# Patient Record
Sex: FEMALE | Race: WHITE | NOT HISPANIC OR LATINO | Employment: OTHER | URBAN - METROPOLITAN AREA
[De-identification: names, ages, dates, MRNs, and addresses within clinical notes are randomized per-mention and may not be internally consistent; named-entity substitution may affect disease eponyms.]

---

## 2022-02-03 ENCOUNTER — OFFICE VISIT (OUTPATIENT)
Dept: ORTHOPEDIC SURGERY | Facility: CLINIC | Age: 62
End: 2022-02-03

## 2022-02-03 VITALS
SYSTOLIC BLOOD PRESSURE: 140 MMHG | BODY MASS INDEX: 32.31 KG/M2 | HEIGHT: 55 IN | WEIGHT: 139.6 LBS | DIASTOLIC BLOOD PRESSURE: 68 MMHG

## 2022-02-03 DIAGNOSIS — M19.011 PRIMARY LOCALIZED OSTEOARTHROSIS OF RIGHT SHOULDER REGION: Primary | ICD-10-CM

## 2022-02-03 DIAGNOSIS — M25.511 RIGHT SHOULDER PAIN, UNSPECIFIED CHRONICITY: ICD-10-CM

## 2022-02-03 DIAGNOSIS — M75.21 BICEPS TENDINITIS OF RIGHT UPPER EXTREMITY: ICD-10-CM

## 2022-02-03 DIAGNOSIS — M24.511 CONTRACTURE OF JOINT OF RIGHT SHOULDER REGION: ICD-10-CM

## 2022-02-03 PROCEDURE — 99204 OFFICE O/P NEW MOD 45 MIN: CPT | Performed by: ORTHOPAEDIC SURGERY

## 2022-02-03 RX ORDER — AMLODIPINE BESYLATE 10 MG/1
10 TABLET ORAL NIGHTLY
COMMUNITY
Start: 2021-11-09

## 2022-02-03 RX ORDER — CITALOPRAM 40 MG/1
40 TABLET ORAL DAILY
COMMUNITY
Start: 2022-01-11 | End: 2023-01-06

## 2022-02-03 RX ORDER — SUCROFERRIC OXYHYDROXIDE 500 MG/1
500 TABLET, CHEWABLE ORAL
COMMUNITY
Start: 2021-10-15 | End: 2022-04-13

## 2022-02-03 RX ORDER — INSULIN GLARGINE 100 [IU]/ML
20 INJECTION, SOLUTION SUBCUTANEOUS
COMMUNITY

## 2022-02-03 RX ORDER — CARVEDILOL 25 MG/1
25 TABLET ORAL 2 TIMES DAILY WITH MEALS
COMMUNITY
Start: 2022-01-18 | End: 2023-01-18

## 2022-02-03 RX ORDER — OMEPRAZOLE 40 MG/1
40 CAPSULE, DELAYED RELEASE ORAL DAILY
COMMUNITY
Start: 2021-08-19

## 2022-02-03 RX ORDER — FUROSEMIDE 20 MG/1
1 TABLET ORAL DAILY
COMMUNITY
End: 2022-07-06

## 2022-02-03 RX ORDER — ASPIRIN 81 MG/1
1 TABLET ORAL DAILY
COMMUNITY

## 2022-02-03 RX ORDER — CLONIDINE 0.3 MG/24H
1 PATCH, EXTENDED RELEASE TRANSDERMAL WEEKLY
COMMUNITY
Start: 2021-11-17 | End: 2022-07-06

## 2022-02-03 RX ORDER — INSULIN ASPART 100 [IU]/ML
INJECTION, SOLUTION INTRAVENOUS; SUBCUTANEOUS
COMMUNITY
Start: 2022-02-01

## 2022-02-03 RX ORDER — ONDANSETRON 4 MG/1
4 TABLET, FILM COATED ORAL
COMMUNITY

## 2022-02-03 RX ORDER — CETIRIZINE HYDROCHLORIDE 10 MG/1
10 TABLET ORAL DAILY
COMMUNITY
Start: 2021-10-04

## 2022-02-03 RX ORDER — BUMETANIDE 2 MG/1
4 TABLET ORAL 2 TIMES DAILY
COMMUNITY
Start: 2021-12-06 | End: 2022-12-06

## 2022-02-03 RX ORDER — GLUCOSAMINE/CHONDROITIN/C/MANG 500-400 MG
1 CAPSULE ORAL NIGHTLY
COMMUNITY

## 2022-02-03 RX ORDER — PHENOL 1.4 %
10 AEROSOL, SPRAY (ML) MUCOUS MEMBRANE NIGHTLY
COMMUNITY

## 2022-02-03 RX ORDER — CYCLOBENZAPRINE HCL 10 MG
10 TABLET ORAL
COMMUNITY
Start: 2021-12-16 | End: 2022-06-14

## 2022-02-03 RX ORDER — SEVELAMER CARBONATE 800 MG/1
2400 TABLET, FILM COATED ORAL
COMMUNITY
Start: 2022-01-31 | End: 2022-05-01

## 2022-02-03 RX ORDER — AMPICILLIN TRIHYDRATE 500 MG
450 CAPSULE ORAL NIGHTLY
COMMUNITY

## 2022-02-03 NOTE — PROGRESS NOTES
"                                                                    [facility] Office Visit - Obey Santiago MD    Office Visit       Patient Name: Yashira Gudino    Chief Complaint:   Chief Complaint   Patient presents with   • Right Shoulder - Pain       Referring Physician: Yuri Garcia MD-I appreciate the referral    History of Present Illness:   Yashira Gudino is a 61 y.o. female who presents with right body part: shoulder Reason: pain.  Onset:Onset: mechanical fall. The issue has been ongoing for 2 year(s). Pain is a 8/10 on the pain scale. Pain is described as Pain Characterization: stabbing and shooting. Associated symptoms include Symptoms: pain, popping and grinding. The pain is worse with working, leisure, lying on affected side and any movement of the joint; resting improve the pain. Previous treatments have included: injections and  oral steroids.  I have reviewed the patient's history of present illness as noted/entered above.    I have reviewed the patient's past medical history, surgical history, social history, family history, medications, and allergies as noted in the electronic medical record and as noted/entered.  I have reviewed the patient's review of systems as noted/enter and updated as noted in the patient's HPI.    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 I counseled that she would certainly be incredibly high risk of complications especially in light of her significant other health issues and MRSA history.      Subjective   Subjective      Review of Systems   Musculoskeletal: Positive for arthralgias.        Past Medical History: No past medical history on file.    Past Surgical History: No past surgical history on file.    Family History: No family history on file.    Social History:   Social History     Socioeconomic History   • Marital status: Legally        Medications:   Current Outpatient Medications:   •  amLODIPine (NORVASC) 10 MG tablet, Take 10 mg by mouth Daily., Disp: , Rfl:   •  bumetanide (BUMEX) 2 MG tablet, Take 4 mg by mouth., Disp: , Rfl:   •  carvedilol (COREG) 25 MG tablet, Take 25 mg by mouth., Disp: , Rfl:   •  cetirizine (zyrTEC) 10 MG tablet, Take 10 mg by mouth., Disp: , Rfl:   •  citalopram (CeleXA) 40 MG tablet, Take 40 mg by mouth Daily., Disp: , Rfl:   •  cloNIDine (CATAPRES-TTS) 0.3 MG/24HR patch, Place 1 patch on the skin as directed by provider 1 (One) Time Per Week., Disp: , Rfl:   •  cyclobenzaprine (FLEXERIL) 10 MG tablet, Take 10 mg by mouth., Disp: , Rfl:   •  Diclofenac Sodium (VOLTAREN) 1 % gel gel, Place 1 application on the skin as directed by provider., Disp: , Rfl:   •  insulin aspart (NovoLOG FlexPen) 100 UNIT/ML solution pen-injector sc pen, Inject 3 or 4 units before meals , titrate as directed to MDD 25 units, Disp: , Rfl:   •  omeprazole (priLOSEC) 40 MG capsule, Take 40 mg by mouth Daily., Disp: , Rfl:   •  sevelamer (RENVELA) 800 MG tablet, Take 2,400 mg by mouth., Disp: , Rfl:   •  Sucroferric Oxyhydroxide (Velphoro) 500 MG chewable tablet, Chew 500 mg., Disp: ,  "Rfl:   •  aspirin (aspirin) 81 MG EC tablet, Take 1 tablet by mouth Daily., Disp: , Rfl:   •  Cranberry 450 MG capsule, Take 450 mg by mouth Daily., Disp: , Rfl:   •  furosemide (Lasix) 20 MG tablet, Take 1 tablet by mouth Daily., Disp: , Rfl:   •  Glucosamine-Chondroit-Vit C-Mn (Glucosamine-Chondroitin Max St) capsule, Take 1 capsule by mouth Daily., Disp: , Rfl:   •  Insulin Glargine (Lantus SoloStar) 100 UNIT/ML injection pen, Inject 12 units sub-q in the morning and 7 units sub-q in the evening, Disp: , Rfl:   •  Melatonin 10 MG tablet, Take 10 mg by mouth., Disp: , Rfl:   •  ondansetron (ZOFRAN) 4 MG tablet, Take 4 mg by mouth., Disp: , Rfl:   •  sodium zirconium cyclosilicate (LOKELMA) 5 g pack, Take 5 g by mouth Daily., Disp: , Rfl:     Allergies:   Allergies   Allergen Reactions   • Erythromycin Nausea Only and Other (See Comments)     nausea  upset stomach   • Erythromycin Base Other (See Comments)     nausea         The following portions of the patient's history were reviewed and updated as appropriate: allergies, current medications, past family history, past medical history, past social history, past surgical history and problem list.        Objective    Objective      Vital Signs:   Vitals:    02/03/22 0816   BP: 140/68   Weight: 63.3 kg (139 lb 9.6 oz)   Height: 63.2 cm (24.9\")   PainSc:   8       Ortho Exam:  Right shoulder limitations with glenohumeral joint arthritis    General: no acute distress, comfortable  Vitals reviewed in chart    Musculoskeletal Exam    SIDE: Right shoulder  Shoulder Exam:    Tenderness: Biceps tendon, glenohumeral joint pain      She utilizes a walker but notes that her balance has been improving. She has loss of her prior index digit on the left    Range of motion measurements (degrees)  Forward flexion/Abduction/External rotation at side/ER at 90/IR at 90/IR position  Active: 80/90/20/20/40  Passive: Pain limited active and passive range of motion    Painful arc of " motion: yes  Glenohumeral joint contracture: yes  Glenohumeral joint crepitus: yes  Glenohumeral joint pain: yes  No evidence of septic joint  Impingement testing Neer's test - positive/painful  Impingement testing Hawkin's test - positive/painful    Rotator Cuff Testing:  Tenderness to palpation at rotator cuff - no  Rotator cuff testing Sandra's test - negative  Rotator cuff testing External rotation - negative  Rotator cuff testing Lag signs - negative  Rotator cuff testing Belly press - negative  Pain with abduction great than 90 degrees - yes  Rotator cuff testing limiting by glenohumeral joint pain and stiffness    Scapular dyskinesis - present, abnormal scapular motion    Long head of the biceps testing:  Nuñez's test for biceps - positive  Bicipital groove tenderness to palpation - positive      Results Review:   Imaging Results (Last 24 Hours)     Procedure Component Value Units Date/Time    XR Shoulder 2+ View Right [[id number]] Resulted: 02/03/22 0843     Updated: 02/03/22 0849        Procedures           Assessment / Plan      Assessment/Plan:   Problem List Items Addressed This Visit        Musculoskeletal and Injuries    Primary localized osteoarthrosis of right shoulder region - Primary    Relevant Orders    CT shoulder right wo contrast    Contracture of joint of right shoulder region    Relevant Orders    CT shoulder right wo contrast    Biceps tendinitis of right upper extremity    Relevant Orders    CT shoulder right wo contrast    Right shoulder pain    Relevant Orders    XR Shoulder 2+ View Right    CT shoulder right wo contrast          RIGHT SHOULDER    A CT scan (Blueprint Protocol) is critical for assessment of the patient's glenoid morphology and rotator cuff status in anticipation of surgical intervention (shoulder arthroplasty).  The CT scan is critical as a surgical planning tool.  The patient has failed conservative measures and a CT scan is the next critical step in surgical decision  making.    The indication for the CT scan without arthrogram is for assessment of the patient's glenoid morphology and rotator cuff status in anticipation of surgical intervention (shoulder arthroplasty) in the setting of glenohumeral joint arthritis.  The CT scan is a critical surgical planning tool.    Right shoulder chronic glenohumeral joint arthritis in the setting of an incredibly complex patient with guarded prognosis. She strongly desires to proceed with surgery however I counseled that we would need to enter into that very cautiously given her findings. We will get a CT scan to further evaluate and have additional discussion at that time.      Follow Up: AFTER CT SCAN RIGHT SHOULDER        Obey Santiago MD, FAAOS  Orthopedic Surgeon  Fellowship Trained Shoulder and Elbow Surgeon  [facility]  Orthopedics and Sports Medicine  [address] [place] [address], [address]  [place] [address]    02/03/22  09:04 EST

## 2022-02-07 ENCOUNTER — APPOINTMENT (OUTPATIENT)
Dept: CT IMAGING | Facility: HOSPITAL | Age: 62
End: 2022-02-07

## 2022-02-18 ENCOUNTER — HOSPITAL ENCOUNTER (OUTPATIENT)
Dept: CT IMAGING | Facility: HOSPITAL | Age: 62
Discharge: HOME | End: 2022-02-18
Admitting: ORTHOPAEDIC SURGERY

## 2022-02-18 DIAGNOSIS — M25.511 RIGHT SHOULDER PAIN, UNSPECIFIED CHRONICITY: ICD-10-CM

## 2022-02-18 DIAGNOSIS — M19.011 PRIMARY LOCALIZED OSTEOARTHROSIS OF RIGHT SHOULDER REGION: ICD-10-CM

## 2022-02-18 DIAGNOSIS — M75.21 BICEPS TENDINITIS OF RIGHT UPPER EXTREMITY: ICD-10-CM

## 2022-02-18 DIAGNOSIS — M24.511 CONTRACTURE OF JOINT OF RIGHT SHOULDER REGION: ICD-10-CM

## 2022-02-18 PROCEDURE — 73200 CT UPPER EXTREMITY W/O DYE: CPT

## 2022-03-03 ENCOUNTER — OFFICE VISIT (OUTPATIENT)
Dept: ORTHOPEDIC SURGERY | Facility: CLINIC | Age: 62
End: 2022-03-03

## 2022-03-03 VITALS
HEIGHT: 55 IN | BODY MASS INDEX: 32.3 KG/M2 | WEIGHT: 139.55 LBS | SYSTOLIC BLOOD PRESSURE: 122 MMHG | DIASTOLIC BLOOD PRESSURE: 68 MMHG

## 2022-03-03 DIAGNOSIS — M19.011 PRIMARY LOCALIZED OSTEOARTHROSIS OF RIGHT SHOULDER REGION: Primary | ICD-10-CM

## 2022-03-03 DIAGNOSIS — M25.511 RIGHT SHOULDER PAIN, UNSPECIFIED CHRONICITY: ICD-10-CM

## 2022-03-03 DIAGNOSIS — M75.21 BICEPS TENDINITIS OF RIGHT UPPER EXTREMITY: ICD-10-CM

## 2022-03-03 DIAGNOSIS — M24.511 CONTRACTURE OF JOINT OF RIGHT SHOULDER REGION: ICD-10-CM

## 2022-03-03 PROCEDURE — 99214 OFFICE O/P EST MOD 30 MIN: CPT | Performed by: ORTHOPAEDIC SURGERY

## 2022-03-03 NOTE — PROGRESS NOTES
"                                                                [facility] Office Follow Up       Office Follow Up Visit       Patient Name: Yashira Gudino    Chief Complaint:   Chief Complaint   Patient presents with   • Right Shoulder - Pain   • Follow-up     2/18/2022 CT f/u       Referring Physician: No ref. provider found    History of Present Illness:   It has been 3  week(s) since Yashira Gudino's last visit. Yashira Gudino returns to clinic today for F/U: follow-up of rightBody Part: shoulderReason: pain. The issue has been ongoing for 2 month(s). Yashira Gudino rates HIS/HER: herpain at 9/10 on the pain scale. Previous/current treatments: NSAIDS and steroid injection. Current symptoms:Symptoms: pain, popping and grinding. The pain is worse with any movement of the joint; pain medication and/or NSAID improves the pain. Overall, he/she: sheis doing worse. I have reviewed the patient's history of present illness as noted/entered above.    I have reviewed the patient's past medical history, surgical history, social history, family history, medications, and allergies as noted in the electronic medical record and as noted/entered.  I have reviewed the patient's review of systems as noted/enter and updated as noted in the patient's HPI.      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 I counseled that she would certainly be incredibly high risk of complications especially in light of her significant other health issues and MRSA history.    3/3/2022:  RIGHT SHOULDER  CT scan completed  RIGHT SIDED Catheter in December -- dialysis 3 days a week  Recent subacromial injection in January, 2022      Subjective   Subjective      Review of Systems   Constitutional: Negative.    HENT: Negative.    Eyes: Negative.    Respiratory: Negative.    Gastrointestinal: Negative.    Endocrine: Negative.    Genitourinary: Negative.    Musculoskeletal: Positive for arthralgias.   Skin: Negative.    Allergic/Immunologic: Negative.    Neurological: Negative.    Hematological: Negative.    Psychiatric/Behavioral: Negative.         Past Medical History: History reviewed. No pertinent past medical history.    Past Surgical History: No past surgical history on file.    Family History: History reviewed. No pertinent family history.    Social History:   Social History     Socioeconomic History   • Marital status: Legally        Medications:   Current Outpatient Medications:   •  amLODIPine (NORVASC) 10 MG tablet, Take 10 mg by mouth Daily., Disp: , Rfl:   •  aspirin (aspirin) 81 MG EC tablet, Take 1 tablet by mouth Daily., Disp: , Rfl:   •  bumetanide (BUMEX) 2 MG tablet, Take 4 mg by mouth., Disp: , Rfl:   •  carvedilol (COREG) 25 MG tablet, Take 25 mg by mouth., Disp: , Rfl:   •  cetirizine (zyrTEC) 10 MG tablet, Take 10 mg by mouth., Disp: , Rfl:   •  citalopram (CeleXA) 40 MG tablet, Take 40 mg by mouth Daily., Disp: , Rfl:   •  cloNIDine (CATAPRES-TTS) 0.3 MG/24HR patch, Place 1 patch on the skin as directed by provider 1 (One) Time Per Week.,  "Disp: , Rfl:   •  Cranberry 450 MG capsule, Take 450 mg by mouth Daily., Disp: , Rfl:   •  cyclobenzaprine (FLEXERIL) 10 MG tablet, Take 10 mg by mouth., Disp: , Rfl:   •  Diclofenac Sodium (VOLTAREN) 1 % gel gel, Place 1 application on the skin as directed by provider., Disp: , Rfl:   •  furosemide (Lasix) 20 MG tablet, Take 1 tablet by mouth Daily., Disp: , Rfl:   •  Glucosamine-Chondroit-Vit C-Mn (Glucosamine-Chondroitin Max St) capsule, Take 1 capsule by mouth Daily., Disp: , Rfl:   •  insulin aspart (NovoLOG FlexPen) 100 UNIT/ML solution pen-injector sc pen, Inject 3 or 4 units before meals , titrate as directed to MDD 25 units, Disp: , Rfl:   •  Insulin Glargine (Lantus SoloStar) 100 UNIT/ML injection pen, Inject 12 units sub-q in the morning and 7 units sub-q in the evening, Disp: , Rfl:   •  Melatonin 10 MG tablet, Take 10 mg by mouth., Disp: , Rfl:   •  omeprazole (priLOSEC) 40 MG capsule, Take 40 mg by mouth Daily., Disp: , Rfl:   •  ondansetron (ZOFRAN) 4 MG tablet, Take 4 mg by mouth., Disp: , Rfl:   •  sevelamer (RENVELA) 800 MG tablet, Take 2,400 mg by mouth., Disp: , Rfl:   •  sodium zirconium cyclosilicate (LOKELMA) 5 g pack, Take 5 g by mouth Daily., Disp: , Rfl:   •  Sucroferric Oxyhydroxide (Velphoro) 500 MG chewable tablet, Chew 500 mg., Disp: , Rfl:     Allergies:   Allergies   Allergen Reactions   • Erythromycin Nausea Only and Other (See Comments)     nausea  upset stomach   • Erythromycin Base Other (See Comments)     nausea         The following portions of the patient's history were reviewed and updated as appropriate: allergies, current medications, past family history, past medical history, past social history, past surgical history and problem list.        Objective    Objective      Vital Signs:   Vitals:    03/03/22 1402   BP: 122/68   Weight: 63.3 kg (139 lb 8.8 oz)   Height: 63.2 cm (24.88\")       Ortho Exam:  RIGHT SHOULDER  Glenohumeral joint arthritis significant sequelae as noted " prior with radial nerve palsies'  Some glenohumeral joint crepitus  90/90/20/20/40  Pain limited active and passive range of motion      Results Review:  Imaging Results (Last 24 Hours)     ** No results found for the last 24 hours. **          CT shoulder right wo contrast    Result Date: 2/18/2022   Moderate to severe osteoarthritic change of the glenohumeral joint as detailed above.  Likely subcoracoid bursitis.  Incidentally noted 6 mm solid noncalcified nodule in the right lung apex. Comparison with a prior chest imaging if available is recommended to assess stability of this finding. Otherwise, per Fleischner criteria, CT follow-up in 6-12 months is recommended, with optional subsequent CT in 18-24 months in a low risk patient, and recommended subsequent CT and 18-24 months in a high risk patient.     This report was finalized on 2/18/2022 3:07 PM by Ja Villa MD.      Personally reviewed the CT scan and counseled her and provided her report.  She notes that the lung nodule on the right side is been present [place] her whole life she feels.  She notes that is been stable.  Counseled on the catheter location and findings clinically on that.  She does have glenohumeral joint arthritis as noted.    Procedures          Assessment / Plan      Assessment/Plan:   Problem List Items Addressed This Visit        Musculoskeletal and Injuries    Primary localized osteoarthrosis of right shoulder region - Primary    Relevant Orders    FL Guide For Pain Meds Inj    Contracture of joint of right shoulder region    Relevant Orders    FL Guide For Pain Meds Inj    Biceps tendinitis of right upper extremity    Relevant Orders    FL Guide For Pain Meds Inj    Right shoulder pain    Relevant Orders    FL Guide For Pain Meds Inj        RIGHT SHOULDER  Glenohumeral joint arthritis.  Counseled on operative and nonoperative treatment.  Very significant complication profile given medical comorbidities and her significant medical  history. I strongly recommend avoiding surgery.  She has very high risk of intra and postoperative complications in light of her significant history and dialysis dependent.  In addition her current catheter is in the operative field as she may consider a fistula operation at some point in the future.    She was interested in glenohumeral joint injection with think is reasonable.  As she was hopeful for fluoroscopy or ultrasound-guided.  We will have our radiologist to do it under radiology guidance.    Follow Up: 3 months to reassess      Obey Santiago MD, FAAOS  Orthopedic Surgeon  Fellowship Trained Shoulder and Elbow Surgeon  [facility]  Orthopedics and Sports Medicine  [address] [place] [address], [address]  [place] [address]    03/03/22  14:54 EST

## 2022-03-16 ENCOUNTER — TELEPHONE (OUTPATIENT)
Dept: ORTHOPEDIC SURGERY | Facility: CLINIC | Age: 62
End: 2022-03-16

## 2022-03-16 NOTE — TELEPHONE ENCOUNTER
Caller: IGNACIO GEORGE     Relationship to patient: SELF    Best call back number:     Chief complaint: RT SHOULDER PAIN    Type of visit: CORTISONE INJECTION    Requested date: ASAP    Additional notes: THE PATIENT DOES NOT WANT AN INJECTION IF SHE HAS TO WAIT FOUR MONTHS AFTERWARD FOR SX.

## 2022-04-05 ENCOUNTER — OFFICE VISIT (OUTPATIENT)
Dept: ORTHOPEDIC SURGERY | Facility: CLINIC | Age: 62
End: 2022-04-05

## 2022-04-05 VITALS
BODY MASS INDEX: 31.47 KG/M2 | SYSTOLIC BLOOD PRESSURE: 128 MMHG | WEIGHT: 136 LBS | DIASTOLIC BLOOD PRESSURE: 75 MMHG | HEIGHT: 55 IN

## 2022-04-05 DIAGNOSIS — M19.011 PRIMARY LOCALIZED OSTEOARTHROSIS OF RIGHT SHOULDER REGION: Primary | ICD-10-CM

## 2022-04-05 DIAGNOSIS — M25.511 RIGHT SHOULDER PAIN, UNSPECIFIED CHRONICITY: ICD-10-CM

## 2022-04-05 DIAGNOSIS — M75.21 BICEPS TENDINITIS OF RIGHT UPPER EXTREMITY: ICD-10-CM

## 2022-04-05 DIAGNOSIS — M24.511 CONTRACTURE OF JOINT OF RIGHT SHOULDER REGION: ICD-10-CM

## 2022-04-05 PROCEDURE — 99213 OFFICE O/P EST LOW 20 MIN: CPT | Performed by: ORTHOPAEDIC SURGERY

## 2022-04-05 RX ORDER — GABAPENTIN 400 MG/1
600 CAPSULE ORAL
COMMUNITY
Start: 2022-03-24 | End: 2022-09-05 | Stop reason: HOSPADM

## 2022-04-05 RX ORDER — HYDROCODONE BITARTRATE AND ACETAMINOPHEN 7.5; 325 MG/1; MG/1
1 TABLET ORAL EVERY 6 HOURS PRN
COMMUNITY
Start: 2022-03-24 | End: 2022-09-05 | Stop reason: HOSPADM

## 2022-04-05 RX ORDER — LIDOCAINE 50 MG/G
1 PATCH TOPICAL EVERY 24 HOURS
COMMUNITY
Start: 2022-02-28

## 2022-04-05 NOTE — PROGRESS NOTES
"                                                                [facility] Orthopaedic Surgery Office Follow Up       Office Follow Up Visit       Patient Name: Yashira Gudino    Chief Complaint:   Chief Complaint   Patient presents with   • Follow-up     4.5 week recheck - Primary localized osteoarthrosis of right shoulder region;  Contracture of joint of right shoulder region; Biceps tendinitis of right upper extremity       Referring Physician: No ref. provider found    History of Present Illness:   It has been 4.5  week(s) since Yashira Gudino's last visit. Yashira Gudino returns to clinic today for F/U: follow-up of rightBody Part: shoulderReason: pain. The issue has been ongoing for 3 month(s). Yashira Gudino rates HIS/HER: herpain at 10/10 on the pain scale. Previous/current treatments: physical therapy and pain med. Current symptoms:Symptoms: pain, swelling, popping, grinding, stiffness and same as prior visit. The pain is worse with sleeping, working, leisure and lying on affected side; resting and pain medication and/or NSAID improves the pain. Overall, he/she: sheis doing the same. I have reviewed the patient's history of present illness as noted/entered above.    I have reviewed the patient's past medical history, surgical history, social history, family history, medications, and allergies as noted in the electronic medical record and as noted/entered.  I have reviewed the patient's review of systems as noted/enter and updated as noted in the patient's HPI.      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 I counseled that she would certainly be incredibly high risk of complications especially in light of her significant other health issues and MRSA history.     3/3/2022:  RIGHT SHOULDER  CT scan completed  RIGHT SIDED Catheter in December -- dialysis 3 days a week  Recent subacromial injection in January, 2022 4/5/2022:  RIGHT SHOULDER  Her right-sided port was changed out to a left-sided port 3 weeks ago.     nephrologist: Dr. Susan Matson    Patient presents to discuss possible surgery      Subjective   Subjective      Review of Systems   Constitutional: Negative.  Negative for chills, fatigue and fever.   HENT: Negative.  Negative for congestion and dental problem.    Eyes: Negative.  Negative for blurred vision.   Respiratory: Negative.  Negative for shortness of breath.    Cardiovascular: Negative.  Negative for leg swelling.   Gastrointestinal: Negative.  Negative for abdominal pain.   Endocrine: Negative.  Negative for polyuria.   Genitourinary: Negative.  Negative for difficulty urinating.   Musculoskeletal: Positive for arthralgias.   Skin: Negative.    Allergic/Immunologic: Negative.    Neurological: Negative.    Hematological: Negative.  Negative for adenopathy.   Psychiatric/Behavioral: Negative.  Negative for behavioral problems.        Past Medical History: History reviewed. No pertinent past medical history.    Past Surgical History: No past surgical history on file.    Family History: History reviewed. No  pertinent family history.    Social History:   Social History     Socioeconomic History   • Marital status: Legally        Medications:   Current Outpatient Medications:   •  amLODIPine (NORVASC) 10 MG tablet, Take 10 mg by mouth Daily., Disp: , Rfl:   •  aspirin 81 MG EC tablet, Take 1 tablet by mouth Daily., Disp: , Rfl:   •  bumetanide (BUMEX) 2 MG tablet, Take 4 mg by mouth., Disp: , Rfl:   •  carvedilol (COREG) 25 MG tablet, Take 25 mg by mouth., Disp: , Rfl:   •  cetirizine (zyrTEC) 10 MG tablet, Take 10 mg by mouth., Disp: , Rfl:   •  citalopram (CeleXA) 40 MG tablet, Take 40 mg by mouth Daily., Disp: , Rfl:   •  cloNIDine (CATAPRES-TTS) 0.3 MG/24HR patch, Place 1 patch on the skin as directed by provider 1 (One) Time Per Week., Disp: , Rfl:   •  Cranberry 450 MG capsule, Take 450 mg by mouth Daily., Disp: , Rfl:   •  cyclobenzaprine (FLEXERIL) 10 MG tablet, Take 10 mg by mouth., Disp: , Rfl:   •  Diclofenac Sodium (VOLTAREN) 1 % gel gel, Place 1 application on the skin as directed by provider., Disp: , Rfl:   •  furosemide (LASIX) 20 MG tablet, Take 1 tablet by mouth Daily., Disp: , Rfl:   •  gabapentin (NEURONTIN) 400 MG capsule, , Disp: , Rfl:   •  Glucosamine-Chondroit-Vit C-Mn (Glucosamine-Chondroitin Max St) capsule, Take 1 capsule by mouth Daily., Disp: , Rfl:   •  HYDROcodone-acetaminophen (NORCO) 7.5-325 MG per tablet, , Disp: , Rfl:   •  insulin aspart (NovoLOG FlexPen) 100 UNIT/ML solution pen-injector sc pen, Inject 3 or 4 units before meals , titrate as directed to MDD 25 units, Disp: , Rfl:   •  Insulin Glargine (Lantus SoloStar) 100 UNIT/ML injection pen, Inject 12 units sub-q in the morning and 7 units sub-q in the evening, Disp: , Rfl:   •  lidocaine (LIDODERM) 5 %, , Disp: , Rfl:   •  Melatonin 10 MG tablet, Take 10 mg by mouth., Disp: , Rfl:   •  omeprazole (priLOSEC) 40 MG capsule, Take 40 mg by mouth Daily., Disp: , Rfl:   •  ondansetron (ZOFRAN) 4 MG tablet, Take 4 mg by mouth.,  "Disp: , Rfl:   •  sevelamer (RENVELA) 800 MG tablet, Take 2,400 mg by mouth., Disp: , Rfl:   •  sodium zirconium cyclosilicate (LOKELMA) 5 g pack, Take 5 g by mouth Daily., Disp: , Rfl:   •  Sucroferric Oxyhydroxide (Velphoro) 500 MG chewable tablet, Chew 500 mg., Disp: , Rfl:     Allergies:   Allergies   Allergen Reactions   • Erythromycin Nausea Only and Other (See Comments)     nausea  upset stomach   • Erythromycin Base Other (See Comments)     nausea         The following portions of the patient's history were reviewed and updated as appropriate: allergies, current medications, past family history, past medical history, past social history, past surgical history and problem list.        Objective    Objective      Vital Signs:   Vitals:    04/05/22 1439   BP: 128/75   Weight: 61.7 kg (136 lb)   Height: 63.2 cm (24.88\")       Ortho Exam:  Right shoulder still has glenohumeral joint pain and crepitus pain limited active and passive range of motion  100/100/20/20/40 no evidence of septic joint no history of septic joint right shoulder      Results Review:  Imaging Results (Last 24 Hours)     ** No results found for the last 24 hours. **          XR Shoulder 2+ View Right    Result Date: 1/11/2022  Moderate to severe glenohumeral joint osteoarthrosis. CRITICAL RESULT:   No. COMMUNICATION: Per this written report. Signed by Alec Owen on  1/11/2022 12:39 PM    CT shoulder right wo contrast    Result Date: 2/18/2022   Moderate to severe osteoarthritic change of the glenohumeral joint as detailed above.  Likely subcoracoid bursitis.  Incidentally noted 6 mm solid noncalcified nodule in the right lung apex. Comparison with a prior chest imaging if available is recommended to assess stability of this finding. Otherwise, per Fleischner criteria, CT follow-up in 6-12 months is recommended, with optional subsequent CT in 18-24 months in a low risk patient, and recommended subsequent CT and 18-24 months in a high risk " patient.     This report was finalized on 2/18/2022 3:07 PM by Ja Villa MD.      IR Indwelling Tunneled Pleural Cath Placement    Result Date: 3/18/2022  Successful placement of tunneled left internal jugular venous catheter; line is ready for use.  Tip of catheter is in right atrium. Successful removal of tunneled right internal jugular venous catheter. CRITICAL RESULT: No. COMMUNICATION: Per this written report. Dictated by Ramsey Loera on 3/18/2022 8:45 AM Signed by Ramsey Loera on 3/18/2022 8:48 AM      Recent left-sided port placement as noted.        Procedures            Assessment / Plan      Assessment/Plan:   Problem List Items Addressed This Visit        Musculoskeletal and Injuries    Primary localized osteoarthrosis of right shoulder region - Primary    Relevant Orders    FL Guide For Pain Meds Inj    Contracture of joint of right shoulder region    Relevant Medications    cyclobenzaprine (FLEXERIL) 10 MG tablet    gabapentin (NEURONTIN) 400 MG capsule    Other Relevant Orders    FL Guide For Pain Meds Inj    Biceps tendinitis of right upper extremity    Relevant Orders    FL Guide For Pain Meds Inj    Right shoulder pain    Relevant Orders    FL Guide For Pain Meds Inj        Patient presents to discuss shoulder arthroplasty on the right side.  Again counseled she is very high risk given her medical history as noted above high risk of complications renal dialysis history of infection.  Patient is pretty set on having surgery done.  Counseled again today that the glenohumeral joint injection would be recommended.  I ordered this today.  We had ordered prior and reportedly was scheduled but patient did not have a formal copy formation of this/has not attended the injection yet.    Counseled she could talk with her nephrologist and primary care physician about possible clearances if she remains set on surgery.    Follow Up: 6/2/2022 appointment already scheduled.  We will  schedule for glenohumeral joint injection right shoulder.      Obey Santiago MD, FAAOS  Orthopedic Surgeon  Fellowship Trained Shoulder and Elbow Surgeon  [facility]  Orthopedics and Sports Medicine  [address] [place] [address], [address]  [place] [address]    04/05/22  15:05 EDT

## 2022-04-06 ENCOUNTER — HOSPITAL ENCOUNTER (OUTPATIENT)
Dept: GENERAL RADIOLOGY | Facility: HOSPITAL | Age: 62
Discharge: HOME | End: 2022-04-06
Admitting: PHYSICIAN ASSISTANT

## 2022-04-06 DIAGNOSIS — M24.511 CONTRACTURE OF JOINT OF RIGHT SHOULDER REGION: ICD-10-CM

## 2022-04-06 DIAGNOSIS — M25.511 RIGHT SHOULDER PAIN, UNSPECIFIED CHRONICITY: ICD-10-CM

## 2022-04-06 DIAGNOSIS — M19.011 PRIMARY LOCALIZED OSTEOARTHROSIS OF RIGHT SHOULDER REGION: ICD-10-CM

## 2022-04-06 DIAGNOSIS — M75.21 BICEPS TENDINITIS OF RIGHT UPPER EXTREMITY: ICD-10-CM

## 2022-04-06 PROCEDURE — 77002 NEEDLE LOCALIZATION BY XRAY: CPT

## 2022-04-06 PROCEDURE — 25010000002 IOPAMIDOL 61 % SOLUTION: Performed by: ORTHOPAEDIC SURGERY

## 2022-04-06 PROCEDURE — 25010000002 METHYLPREDNISOLONE PER 40 MG: Performed by: ORTHOPAEDIC SURGERY

## 2022-04-06 RX ORDER — LIDOCAINE HYDROCHLORIDE 10 MG/ML
5 INJECTION, SOLUTION EPIDURAL; INFILTRATION; INTRACAUDAL; PERINEURAL ONCE
Status: COMPLETED | OUTPATIENT
Start: 2022-04-06 | End: 2022-04-06

## 2022-04-06 RX ORDER — METHYLPREDNISOLONE SODIUM SUCCINATE 40 MG/ML
80 INJECTION, POWDER, LYOPHILIZED, FOR SOLUTION INTRAMUSCULAR; INTRAVENOUS ONCE
Status: COMPLETED | OUTPATIENT
Start: 2022-04-06 | End: 2022-04-06

## 2022-04-06 RX ADMIN — LIDOCAINE HYDROCHLORIDE 5 ML: 10 INJECTION, SOLUTION EPIDURAL; INFILTRATION; INTRACAUDAL; PERINEURAL at 14:04

## 2022-04-06 RX ADMIN — IOPAMIDOL 10 ML: 612 INJECTION, SOLUTION INTRAVENOUS at 14:03

## 2022-04-06 RX ADMIN — METHYLPREDNISOLONE SODIUM SUCCINATE 80 MG: 40 INJECTION, POWDER, FOR SOLUTION INTRAMUSCULAR; INTRAVENOUS at 14:03

## 2022-04-06 RX ADMIN — LIDOCAINE HYDROCHLORIDE 5 ML: 10 INJECTION, SOLUTION EPIDURAL; INFILTRATION; INTRACAUDAL; PERINEURAL at 14:03

## 2022-06-02 ENCOUNTER — OFFICE VISIT (OUTPATIENT)
Dept: ORTHOPEDIC SURGERY | Facility: CLINIC | Age: 62
End: 2022-06-02

## 2022-06-02 VITALS
HEIGHT: 55 IN | DIASTOLIC BLOOD PRESSURE: 68 MMHG | SYSTOLIC BLOOD PRESSURE: 130 MMHG | BODY MASS INDEX: 31.47 KG/M2 | WEIGHT: 136 LBS

## 2022-06-02 DIAGNOSIS — M19.011 PRIMARY LOCALIZED OSTEOARTHROSIS OF RIGHT SHOULDER REGION: Primary | ICD-10-CM

## 2022-06-02 DIAGNOSIS — M24.511 CONTRACTURE OF JOINT OF RIGHT SHOULDER REGION: ICD-10-CM

## 2022-06-02 DIAGNOSIS — N18.6 ESRD (END STAGE RENAL DISEASE): ICD-10-CM

## 2022-06-02 DIAGNOSIS — M75.21 BICEPS TENDINITIS OF RIGHT UPPER EXTREMITY: ICD-10-CM

## 2022-06-02 DIAGNOSIS — M25.511 RIGHT SHOULDER PAIN, UNSPECIFIED CHRONICITY: ICD-10-CM

## 2022-06-02 PROCEDURE — 99214 OFFICE O/P EST MOD 30 MIN: CPT | Performed by: ORTHOPAEDIC SURGERY

## 2022-06-02 RX ORDER — GABAPENTIN 600 MG/1
600 TABLET ORAL NIGHTLY
Status: ON HOLD | COMMUNITY
Start: 2022-05-22 | End: 2022-09-05 | Stop reason: SDUPTHER

## 2022-06-02 NOTE — PROGRESS NOTES
"                                                                [facility] Follow Up       Office Follow Up Visit       Patient Name: Yashira Gudino    Chief Complaint:   Chief Complaint   Patient presents with   • Follow-up     2 month f/u Primary localized osteoarthrosis of right shoulder region        Referring Physician: No ref. provider found    History of Present Illness:   It has been 2  month(s) since Yashira Gudino's last visit. Yashira Gudino returns to clinic today for F/U: follow-up of rightBody Part: shoulderReason: pain. The issue has been ongoing for 9 month(s). Yashira Gudino rates HIS/HER: herpain at 9-1010 on the pain scale. Previous/current treatments: NSAIDS and physical therapy. Current symptoms:Symptoms: pain, popping and grinding. The pain is worse with working, leisure and rising from seated position; pain medication and/or NSAID improves the pain. Overall, he/she: sheis doing worse. I have reviewed the patient's history of present illness as noted/entered above.    I have reviewed the patient's past medical history, surgical history, social history, family history, medications, and allergies as noted in the electronic medical record and as noted/entered.  I have reviewed the patient's review of systems as noted/enter and updated as noted in the patient's HPI.      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 I counseled that she would certainly be incredibly high risk of complications especially in light of her significant other health issues and MRSA history.     3/3/2022:  RIGHT SHOULDER  CT scan completed  RIGHT SIDED Catheter in December -- dialysis 3 days a week  Recent subacromial injection in January, 2022 4/5/2022:  RIGHT SHOULDER  Her right-sided port was changed out to a left-sided port 3 weeks ago.      nephrologist: Dr. Susan Matson     Patient presents to discuss possible surgery      6/2/2022:  Injection 4/4/2022 right shoulder fluoroscopy guided injection.  Patient saw Dr. Timothy Villa on 5/19/2022 as a 3rd opinion and he agreed with our initial assessment of very high risk for surgery. But she notes he agrees for me to proceed with surgery if/when agreed upon    We discussed at last visit and again today that if she does desire to proceed with surgery and he would need clearances from nephrology, primary care.    Antibiotics completed >1yr  No known active infections  No open lesion  No smoking  Walker for balance -- counseled about postop restrictions  Supportive boyfriend she notes to help postop    Counseled on arthroplasty and she asked about arthroscopy -- limited benefit    Port for dialysis in on the left side now -- 3 days week dialysis    PCP: Karen Noguera, Dr. Aubrie Kessler  No MI  No stroke    Nephrologist  Dr. Susan Matson    She notes no active infection and does strongly desire to proceed with surgery at this point.  We discussed we have to wait at least 3 months out from the injection.  She does have radial nerve issues left worse than  right so we would hold given the nerve issues on a block.  We will plan no block with surgery and overnight stay        Subjective   Subjective      Review of Systems   Constitutional: Negative.    HENT: Negative.    Eyes: Negative.    Respiratory: Negative.    Cardiovascular: Negative.    Gastrointestinal: Negative.    Endocrine: Negative.    Genitourinary: Negative.    Musculoskeletal: Positive for arthralgias.   Skin: Negative.    Allergic/Immunologic: Negative.    Neurological: Negative.    Hematological: Negative.    Psychiatric/Behavioral: Negative.         Past Medical History: No past medical history on file.    Past Surgical History: No past surgical history on file.    Family History: No family history on file.    Social History:   Social History     Socioeconomic History   • Marital status: Legally        Medications:   Current Outpatient Medications:   •  amLODIPine (NORVASC) 10 MG tablet, Take 10 mg by mouth Daily., Disp: , Rfl:   •  aspirin 81 MG EC tablet, Take 1 tablet by mouth Daily., Disp: , Rfl:   •  bumetanide (BUMEX) 2 MG tablet, Take 4 mg by mouth., Disp: , Rfl:   •  carvedilol (COREG) 25 MG tablet, Take 25 mg by mouth., Disp: , Rfl:   •  cetirizine (zyrTEC) 10 MG tablet, Take 10 mg by mouth., Disp: , Rfl:   •  citalopram (CeleXA) 40 MG tablet, Take 40 mg by mouth Daily., Disp: , Rfl:   •  cloNIDine (CATAPRES-TTS) 0.3 MG/24HR patch, Place 1 patch on the skin as directed by provider 1 (One) Time Per Week., Disp: , Rfl:   •  Cranberry 450 MG capsule, Take 450 mg by mouth Daily., Disp: , Rfl:   •  cyclobenzaprine (FLEXERIL) 10 MG tablet, Take 10 mg by mouth., Disp: , Rfl:   •  Diclofenac Sodium (VOLTAREN) 1 % gel gel, Place 1 application on the skin as directed by provider., Disp: , Rfl:   •  furosemide (LASIX) 20 MG tablet, Take 1 tablet by mouth Daily., Disp: , Rfl:   •  gabapentin (NEURONTIN) 400 MG capsule, , Disp: , Rfl:   •  gabapentin (NEURONTIN) 600 MG tablet, , Disp: , Rfl:   •   "Glucosamine-Chondroit-Vit C-Mn (Glucosamine-Chondroitin Max St) capsule, Take 1 capsule by mouth Daily., Disp: , Rfl:   •  HYDROcodone-acetaminophen (NORCO) 7.5-325 MG per tablet, , Disp: , Rfl:   •  insulin aspart (NovoLOG FlexPen) 100 UNIT/ML solution pen-injector sc pen, Inject 3 or 4 units before meals , titrate as directed to MDD 25 units, Disp: , Rfl:   •  Insulin Glargine (Lantus SoloStar) 100 UNIT/ML injection pen, Inject 12 units sub-q in the morning and 7 units sub-q in the evening, Disp: , Rfl:   •  lidocaine (LIDODERM) 5 %, , Disp: , Rfl:   •  Melatonin 10 MG tablet, Take 10 mg by mouth., Disp: , Rfl:   •  omeprazole (priLOSEC) 40 MG capsule, Take 40 mg by mouth Daily., Disp: , Rfl:   •  ondansetron (ZOFRAN) 4 MG tablet, Take 4 mg by mouth., Disp: , Rfl:   •  sodium zirconium cyclosilicate (LOKELMA) 5 g pack, Take 5 g by mouth Daily., Disp: , Rfl:     Allergies:   Allergies   Allergen Reactions   • Erythromycin Nausea Only and Other (See Comments)     nausea  upset stomach   • Erythromycin Base Other (See Comments)     nausea         The following portions of the patient's history were reviewed and updated as appropriate: allergies, current medications, past family history, past medical history, past social history, past surgical history and problem list.        Objective    Objective      Vital Signs:   Vitals:    06/02/22 1313   BP: 130/68   Weight: 61.7 kg (136 lb)   Height: 63.2 cm (24.88\")       Ortho Exam:  Right shoulder continues to have a significantly pain limited exam with both active and passive range of motion with significant glenohumeral joint pain and crepitus  90/90/20/20/40.  No skin changes to the shoulder chronic radial nerve issues left side worse than right.    Results Review:  Imaging Results (Last 24 Hours)     ** No results found for the last 24 hours. **          XR Shoulder 2+ View Right    Result Date: 5/19/2022  Glenohumeral and acromioclavicular osteoarthrosis. Probable " chondroid lesion of the proximal humeral metaphysis is similar in appearance the prior study. CRITICAL RESULT:   No. COMMUNICATION: Per this written report. By electronically signing this report, I, the attending physician, attest that I have personally reviewed the images/data for the above examination(s) and agree with the final edited report. Dictated by Aurora Kumari M.D. on 5/19/2022 1:48 PM Signed by Alec Owen on 5/19/2022 2:05 PM    CT shoulder right wo contrast    Result Date: 2/18/2022   Moderate to severe osteoarthritic change of the glenohumeral joint as detailed above.  Likely subcoracoid bursitis.  Incidentally noted 6 mm solid noncalcified nodule in the right lung apex. Comparison with a prior chest imaging if available is recommended to assess stability of this finding. Otherwise, per Fleischner criteria, CT follow-up in 6-12 months is recommended, with optional subsequent CT in 18-24 months in a low risk patient, and recommended subsequent CT and 18-24 months in a high risk patient.     This report was finalized on 2/18/2022 3:07 PM by Ja Villa MD.            Procedures            Assessment / Plan      Assessment/Plan:   Problem List Items Addressed This Visit        Genitourinary and Reproductive     ESRD (end stage renal disease) (HCC)    Relevant Medications    bumetanide (BUMEX) 2 MG tablet    furosemide (LASIX) 20 MG tablet       Musculoskeletal and Injuries    Primary localized osteoarthrosis of right shoulder region - Primary    Contracture of joint of right shoulder region    Relevant Medications    cyclobenzaprine (FLEXERIL) 10 MG tablet    gabapentin (NEURONTIN) 400 MG capsule    gabapentin (NEURONTIN) 600 MG tablet    Biceps tendinitis of right upper extremity    Right shoulder pain          RIGHT SHOULDER  Patient strongly desires to proceed with right total shoulder arthroplasty.  We counseled at length the increased risk of complications given her specific medical  comorbidity profile as well as dialysis dependent renal failure complications or renal failure patients with shoulder arthroplasty.  Counseled on stemless versus stemmed implant.  She has radial nerve damage on both sides and hand issues on the left side.  History of osteomyelitis.    She denies any active infection.    She would need PCP and nephrology clearances prior to consideration of formal surgical date.    She understands the high risk of complications and early revision may be possible given her medical profile.      She understands she would be nonweightbearing up to 12 weeks as far as her walker use and with sling would be necessary for 3 weeks.    The patient is seen multiple upper extremity providers at the [facility].  She was originally referred to me by [facility] provider.  Their teams agree and concur that she is high risk for surgery but she does note that their team did suggest that I perform the surgery if indeed we decided to proceed with surgery.  I think that is reasonable at this point as long she obtains a proper clearances.  She understands the risks of surgery and has been through multiple surgeries.  End-stage kidney disease is her biggest issue along with her prior history of infection -no known history of infection to right shoulder.      Plan would be for right anatomic total shoulder arthroplasty stem versus stemless-counseled  No block  Patient would be admitted overnight  Surgery be on a Wednesday normally a dialysis day for her she may need dialysis the following day prior to discharge.      Follow Up: 3 to 4 weeks to formally establish a surgical date if and when she obtains proper clearances      Obey Santiago MD, FAAOS  Orthopedic Surgeon  Fellowship Trained Shoulder and Elbow Surgeon  [facility]  Orthopedics and Sports Medicine  [address] [place] [address], [address]  [place] [address]    06/02/22  13:52 EDT

## 2022-06-30 ENCOUNTER — PREP FOR SURGERY (OUTPATIENT)
Dept: OTHER | Facility: HOSPITAL | Age: 62
End: 2022-06-30

## 2022-06-30 ENCOUNTER — OFFICE VISIT (OUTPATIENT)
Dept: ORTHOPEDIC SURGERY | Facility: CLINIC | Age: 62
End: 2022-06-30

## 2022-06-30 ENCOUNTER — HOSPITAL ENCOUNTER (OUTPATIENT)
Facility: HOSPITAL | Age: 62
Setting detail: HOSPITAL OUTPATIENT SURGERY
End: 2022-06-30
Attending: ORTHOPAEDIC SURGERY | Admitting: ORTHOPAEDIC SURGERY

## 2022-06-30 DIAGNOSIS — M19.011 PRIMARY LOCALIZED OSTEOARTHROSIS OF RIGHT SHOULDER REGION: Primary | ICD-10-CM

## 2022-06-30 DIAGNOSIS — M25.511 RIGHT SHOULDER PAIN, UNSPECIFIED CHRONICITY: ICD-10-CM

## 2022-06-30 DIAGNOSIS — M24.511 CONTRACTURE OF JOINT OF RIGHT SHOULDER REGION: ICD-10-CM

## 2022-06-30 DIAGNOSIS — M75.21 BICEPS TENDINITIS OF RIGHT UPPER EXTREMITY: ICD-10-CM

## 2022-06-30 DIAGNOSIS — N18.6 ESRD (END STAGE RENAL DISEASE): ICD-10-CM

## 2022-06-30 PROCEDURE — 99214 OFFICE O/P EST MOD 30 MIN: CPT | Performed by: ORTHOPAEDIC SURGERY

## 2022-06-30 RX ORDER — PREGABALIN 75 MG/1
75 CAPSULE ORAL ONCE
Status: CANCELLED | OUTPATIENT
Start: 2022-06-30 | End: 2022-06-30

## 2022-06-30 RX ORDER — SCOLOPAMINE TRANSDERMAL SYSTEM 1 MG/1
1 PATCH, EXTENDED RELEASE TRANSDERMAL CONTINUOUS
Status: CANCELLED | OUTPATIENT
Start: 2022-06-30 | End: 2022-07-03

## 2022-06-30 RX ORDER — VANCOMYCIN HYDROCHLORIDE 1 G/200ML
15 INJECTION, SOLUTION INTRAVENOUS ONCE
Status: CANCELLED | OUTPATIENT
Start: 2022-06-30 | End: 2022-06-30

## 2022-06-30 RX ORDER — TRANEXAMIC ACID 10 MG/ML
1000 INJECTION, SOLUTION INTRAVENOUS ONCE
Status: CANCELLED | OUTPATIENT
Start: 2022-06-30 | End: 2022-06-30

## 2022-06-30 RX ORDER — CEFAZOLIN SODIUM 2 G/100ML
2 INJECTION, SOLUTION INTRAVENOUS ONCE
Status: CANCELLED | OUTPATIENT
Start: 2022-06-30 | End: 2022-06-30

## 2022-06-30 RX ORDER — ACETAMINOPHEN 500 MG
1000 TABLET ORAL ONCE
Status: CANCELLED | OUTPATIENT
Start: 2022-06-30 | End: 2022-06-30

## 2022-06-30 NOTE — PROGRESS NOTES
"                                                                [facility] [place] Orthopaedic Surgery Office Follow Up       Office Follow Up Visit       Patient Name: Yashira Gudino    Chief Complaint:   Chief Complaint   Patient presents with   • Follow-up     1 month- Primary localized osteoarthrosis of right shoulder region        Referring Physician: No ref. provider found    History of Present Illness:   It has been 1  month(s) since Yashira Gudino's last visit. Yashira Gudino returns to clinic today for F/U: follow-up of rightBody Part: shoulderReason: arthritis. The issue has been ongoing for 8 month(s). Yashira Gudino rates HIS/HER: herpain at 8/10 on the pain scale. Previous/current treatments: physical therapy. Current symptoms:Symptoms: pain and grinding. The pain is worse with sleeping, lying on affected side and any movement of the joint; resting improves the pain. Overall, he/she: heis doing the same. I have reviewed the patient's history of present illness as noted/entered above.    I have reviewed the patient's past medical history, surgical history, social history, family history, medications, and allergies as noted in the electronic medical record and as noted/entered.  I have reviewed the patient's review of systems as noted/enter and updated as noted in the patient's HPI.      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 I counseled that she would certainly be incredibly high risk of complications especially in light of her significant other health issues and MRSA history.     3/3/2022:  RIGHT SHOULDER  CT scan completed  RIGHT SIDED Catheter in December -- dialysis 3 days a week  Recent subacromial injection in January, 2022 4/5/2022:  RIGHT SHOULDER  Her right-sided port was changed out to a left-sided port 3 weeks ago.      nephrologist: Dr. Susan Matson     Patient presents to discuss possible surgery        6/2/2022:  Injection 4/4/2022 right shoulder fluoroscopy guided injection.  Patient saw Dr. Timothy Villa on 5/19/2022 as a 3rd opinion and he agreed with our initial assessment of very high risk for surgery. But she notes he agrees for me to proceed with surgery if/when agreed upon     We discussed at last visit and again today that if she does desire to proceed with surgery and he would need clearances from nephrology, primary care.     Antibiotics completed >1yr  No known active infections  No open lesion  No smoking  Walker for balance -- counseled about postop restrictions  Supportive boyfriend she notes to help postop     Counseled on arthroplasty and she asked about arthroscopy -- limited benefit     Port for dialysis in on the left side now -- 3 days week dialysis     PCP: Karen Noguera, Dr. Aubrie Kessler  No MI  No stroke     Nephrologist  Dr. Susan Matson     She notes no active infection and does strongly desire to proceed with surgery at this point.  We discussed we have to wait at least 3 months out from the injection.  She does have radial nerve issues left worse than right so we  would hold given the nerve issues on a block.  We will plan no block with surgery and overnight stay      PRIOR DISCUSSION:  RIGHT SHOULDER  Patient strongly desires to proceed with right total shoulder arthroplasty.  We counseled at length the increased risk of complications given her specific medical comorbidity profile as well as dialysis dependent renal failure complications or renal failure patients with shoulder arthroplasty.  Counseled on stemless versus stemmed implant.  She has radial nerve damage on both sides and hand issues on the left side.  History of osteomyelitis.     She denies any active infection.     She would need PCP and nephrology clearances prior to consideration of formal surgical date.     She understands the high risk of complications and early revision may be possible given her medical profile.        She understands she would be nonweightbearing up to 12 weeks as far as her walker use and with sling would be necessary for 3 weeks.     The patient is seen multiple upper extremity providers at the [facility].  She was originally referred to me by [facility] provider.  Their teams agree and concur that she is high risk for surgery but she does note that their team did suggest that I perform the surgery if indeed we decided to proceed with surgery.  I think that is reasonable at this point as long she obtains a proper clearances.  She understands the risks of surgery and has been through multiple surgeries.  End-stage kidney disease is her biggest issue along with her prior history of infection -no known history of infection to right shoulder.        Plan would be for right anatomic total shoulder arthroplasty stem versus stemless-counseled  No block  Patient would be admitted overnight  Surgery be on a Wednesday normally a dialysis day for her she may need dialysis the following day prior to discharge.    6/30/2022:  RIGHT SHOULDER  Patient notes her nephrologist has  cleared her for surgery we obtain the proper paperwork.  She would like to consider transfer to [facility] as they do offer dialysis and she think she may need the additional time.  She does have chronic pain and is on chronic 7.5 mg of hydrocodone.  Her pain management team is aware of the possible surgery they provide her typically prolonged course of this and she has enough to get her to the time of surgery.  We discussed we typically controlled pain for the first couple weeks after surgery.  We then we would defer to the pain management team again.      Subjective   Subjective      Review of Systems   Constitutional: Negative.  Negative for chills, fatigue and fever.   HENT: Negative.  Negative for congestion and dental problem.    Eyes: Negative.  Negative for blurred vision.   Respiratory: Negative.  Negative for shortness of breath.    Cardiovascular: Negative.  Negative for leg swelling.   Gastrointestinal: Negative.  Negative for abdominal pain.   Endocrine: Negative.  Negative for polyuria.   Genitourinary: Negative.  Negative for difficulty urinating.   Musculoskeletal: Positive for arthralgias.   Skin: Negative.    Allergic/Immunologic: Negative.    Neurological: Negative.    Hematological: Negative.  Negative for adenopathy.   Psychiatric/Behavioral: Negative.  Negative for behavioral problems.        Past Medical History:   Past Medical History:   Diagnosis Date   • Charcot-Bertha-Tooth disease    • Chronic kidney disease (CKD), stage IV (severe) ([facility])    • Depression    • Diabetes (HCC)        Past Surgical History:   Past Surgical History:   Procedure Laterality Date   • ANKLE SURGERY     •  SECTION     • COLON RESECTION     • COLOSTOMY     • FOOT SURGERY Left     excise Small Toe   • HAND SURGERY Left     Excise IF   • NECK SURGERY     • TONSILLECTOMY     • TUBAL ABDOMINAL LIGATION         Family History: History reviewed. No pertinent family history.    Social History:   Social History      Socioeconomic History   • Marital status: Legally    Tobacco Use   • Smoking status: Never Smoker   • Smokeless tobacco: Never Used   Substance and Sexual Activity   • Alcohol use: Not Currently   • Drug use: Never   • Sexual activity: Defer       Medications:   Current Outpatient Medications:   •  amLODIPine (NORVASC) 10 MG tablet, Take 10 mg by mouth Daily., Disp: , Rfl:   •  aspirin 81 MG EC tablet, Take 1 tablet by mouth Daily., Disp: , Rfl:   •  bumetanide (BUMEX) 2 MG tablet, Take 4 mg by mouth., Disp: , Rfl:   •  carvedilol (COREG) 25 MG tablet, Take 25 mg by mouth., Disp: , Rfl:   •  cetirizine (zyrTEC) 10 MG tablet, Take 10 mg by mouth., Disp: , Rfl:   •  citalopram (CeleXA) 40 MG tablet, Take 40 mg by mouth Daily., Disp: , Rfl:   •  cloNIDine (CATAPRES-TTS) 0.3 MG/24HR patch, Place 1 patch on the skin as directed by provider 1 (One) Time Per Week., Disp: , Rfl:   •  Cranberry 450 MG capsule, Take 450 mg by mouth Daily., Disp: , Rfl:   •  Diclofenac Sodium (VOLTAREN) 1 % gel gel, Place 1 application on the skin as directed by provider., Disp: , Rfl:   •  furosemide (LASIX) 20 MG tablet, Take 1 tablet by mouth Daily., Disp: , Rfl:   •  gabapentin (NEURONTIN) 400 MG capsule, , Disp: , Rfl:   •  gabapentin (NEURONTIN) 600 MG tablet, , Disp: , Rfl:   •  Glucosamine-Chondroit-Vit C-Mn (Glucosamine-Chondroitin Max St) capsule, Take 1 capsule by mouth Daily., Disp: , Rfl:   •  HYDROcodone-acetaminophen (NORCO) 7.5-325 MG per tablet, , Disp: , Rfl:   •  insulin aspart (NovoLOG FlexPen) 100 UNIT/ML solution pen-injector sc pen, Inject 3 or 4 units before meals , titrate as directed to MDD 25 units, Disp: , Rfl:   •  Insulin Glargine (Lantus SoloStar) 100 UNIT/ML injection pen, Inject 12 units sub-q in the morning and 7 units sub-q in the evening, Disp: , Rfl:   •  lidocaine (LIDODERM) 5 %, , Disp: , Rfl:   •  Melatonin 10 MG tablet, Take 10 mg by mouth., Disp: , Rfl:   •  omeprazole (priLOSEC) 40 MG  capsule, Take 40 mg by mouth Daily., Disp: , Rfl:   •  ondansetron (ZOFRAN) 4 MG tablet, Take 4 mg by mouth., Disp: , Rfl:   •  sodium zirconium cyclosilicate (LOKELMA) 5 g pack, Take 5 g by mouth Daily., Disp: , Rfl:   •  benzoyl peroxide 5 % external liquid, Apply 1 application topically to the appropriate area as directed Daily for 3 days prior to surgery, Disp: 148 g, Rfl: 0  •  mupirocin (BACTROBAN) 2 % ointment, Apply pea size amount to each nostril twice daily for 5 days prior to surgery., Disp: 22 g, Rfl: 0  •  mupirocin (BACTROBAN) 2 % ointment, 1 application into the nostril(s) as directed by provider 2 (Two) Times a Day. Apply a pea-sized amount to each nostril twice daily for 5 days prior to surgery., Disp: 22 g, Rfl: 0    Allergies:   Allergies   Allergen Reactions   • Erythromycin Nausea Only and Other (See Comments)     nausea  upset stomach   • Erythromycin Base Other (See Comments)     nausea         The following portions of the patient's history were reviewed and updated as appropriate: allergies, current medications, past family history, past medical history, past social history, past surgical history and problem list.        Objective    Objective      Vital Signs: There were no vitals filed for this visit.    Ortho Exam:  Right shoulder continues to have significant pain limited examination very limited active motion 90/90/20/20/30.  Chronic radial nerve issues on the right.  We will avoid a block at the time of surgery.  Counseled on this.    Results Review:  Imaging Results (Last 24 Hours)     ** No results found for the last 24 hours. **          XR Shoulder 2+ View Right    Result Date: 5/19/2022  Glenohumeral and acromioclavicular osteoarthrosis. Probable chondroid lesion of the proximal humeral metaphysis is similar in appearance the prior study. CRITICAL RESULT:   No. COMMUNICATION: Per this written report. By electronically signing this report, I, the attending physician, attest that I  have personally reviewed the images/data for the above examination(s) and agree with the final edited report. Dictated by Aurora Kumari M.D. on 5/19/2022 1:48 PM Signed by Alec Owen on 5/19/2022 2:05 PM      Procedures            Assessment / Plan      Assessment/Plan:   Problem List Items Addressed This Visit        Genitourinary and Reproductive     ESRD (end stage renal disease) ([facility])    Relevant Medications    bumetanide (BUMEX) 2 MG tablet    furosemide (LASIX) 20 MG tablet    mupirocin (BACTROBAN) 2 % ointment    benzoyl peroxide 5 % external liquid       Musculoskeletal and Injuries    Primary localized osteoarthrosis of right shoulder region - Primary    Relevant Medications    mupirocin (BACTROBAN) 2 % ointment    benzoyl peroxide 5 % external liquid    Contracture of joint of right shoulder region    Relevant Medications    gabapentin (NEURONTIN) 400 MG capsule    gabapentin (NEURONTIN) 600 MG tablet    mupirocin (BACTROBAN) 2 % ointment    benzoyl peroxide 5 % external liquid    Biceps tendinitis of right upper extremity    Relevant Medications    mupirocin (BACTROBAN) 2 % ointment    benzoyl peroxide 5 % external liquid    Right shoulder pain    Relevant Medications    mupirocin (BACTROBAN) 2 % ointment    benzoyl peroxide 5 % external liquid          Risks and benefits of continued nonoperative management versus surgical management were discussed. The patient desires to proceed with operative intervention.    Total shoulder replacement was discussed.  Discussed a plan for an anatomical total shoulder replacement, but reverse total shoulder replacement would be available if intraoperative variables dictated a need for reverse total shoulder replacement.     Specific risks include pain, bleeding, infection, injury to surrounding nerve and blood vessels, fracture, instability, failure or lack of healing of repair, incomplete pain relief, hardware failure, potential need for additional procedures,  stiffness after surgery, and potential inability to restore range of motion and strength. Medical and anesthetic complications were additionally discussed.     High risk of complications as noted.    General anesthesia is required, sling compliance, and compliance with physical therapy and/or a surgeon guided exercise program will be very important to the recovery process.    We also discussed the risks and benefits of postoperative medications including potential opioid medications for pain control.     I think that it is important for my patients to know that I work as a paid consultant, teacher, and  for an orthopedic device company/shoulder implant company ([employer] now [employer]), so I counseled regarding this.      I counseled the patient that I do serve as a paid consultant, speaker, surgeon educator, and technology and implant design, among other roles.  I do not get paid to use any specific company's implants, and I would never do that.  My number one priority is to provide the best possible care I can for my patients.  I enjoy my opportunity to educate other surgeons, and the ability to advance shoulder surgery with improved technology and improved shoulder surgery techniques and products.    I was happy to disclose my work as a medical device consultant with the patient and offered to answer any related questions.        Diagnosis and CPT Codes    No block-given chronic radial nerve issues    RIGHT SHOULDER  1. Shoulder joint arthritis - Open total shoulder arthroplasty CPT Code 33156  2. Shoulder joint contracture  3. Shoulder biceps tendonitis    Ultrasling III - a neutral rotation sling is recommended for this patient for perioperative care.  The patient was fitted for the sling and the sling was provided today.  The sling will be a critical part of the perioperative care for these diagnoses.      We will request nephrology clearance.    She desires transfer to  [facility] for possible for dialysis and rehab following the surgery given her multiple medical issues.  I think that will be reasonable this to be something that will need to be arranged while she is an inpatient.      Follow Up: The patient desires to proceed with right anatomic total shoulder arthroplasty.  Counseled on difference tween anatomic versus reverse.  Counseled on the pitfalls of chronic dialysis and potential for soft bone.  Patient understands she has had a very complex medical history but at this point she has had a prolonged conservative course and desires to proceed with shoulder arthroplasty.      Obey Santiago MD, FAAOS  Orthopedic Surgeon  Fellowship Trained Shoulder and Elbow Surgeon  [facility]  Orthopedics and Sports Medicine  [address] [place] [address], [address]  [place] [address]    06/30/22  15:52 EDT

## 2022-07-06 ENCOUNTER — PRE-ADMISSION TESTING (OUTPATIENT)
Dept: PREADMISSION TESTING | Facility: HOSPITAL | Age: 62
End: 2022-07-06

## 2022-07-06 VITALS — WEIGHT: 149.47 LBS | HEIGHT: 66 IN | BODY MASS INDEX: 24.02 KG/M2

## 2022-07-06 DIAGNOSIS — N18.6 ESRD (END STAGE RENAL DISEASE): ICD-10-CM

## 2022-07-06 DIAGNOSIS — M25.511 RIGHT SHOULDER PAIN, UNSPECIFIED CHRONICITY: ICD-10-CM

## 2022-07-06 DIAGNOSIS — M19.011 PRIMARY LOCALIZED OSTEOARTHROSIS OF RIGHT SHOULDER REGION: Primary | ICD-10-CM

## 2022-07-06 DIAGNOSIS — M24.511 CONTRACTURE OF JOINT OF RIGHT SHOULDER REGION: ICD-10-CM

## 2022-07-06 DIAGNOSIS — M75.21 BICEPS TENDINITIS OF RIGHT UPPER EXTREMITY: ICD-10-CM

## 2022-07-06 LAB
ABO GROUP BLD: NORMAL
ALBUMIN SERPL-MCNC: 4.1 G/DL (ref 3.5–5.2)
ALBUMIN/GLOB SERPL: 1.2 G/DL
ALP SERPL-CCNC: 155 U/L (ref 39–117)
ALT SERPL W P-5'-P-CCNC: 21 U/L (ref 1–33)
ANION GAP SERPL CALCULATED.3IONS-SCNC: 10 MMOL/L (ref 5–15)
APTT PPP: 45.2 SECONDS (ref 22–39)
AST SERPL-CCNC: 19 U/L (ref 1–32)
BASOPHILS # BLD AUTO: 0.06 10*3/MM3 (ref 0–0.2)
BASOPHILS NFR BLD AUTO: 1 % (ref 0–1.5)
BILIRUB SERPL-MCNC: 0.2 MG/DL (ref 0–1.2)
BUN SERPL-MCNC: 22 MG/DL (ref 8–23)
BUN/CREAT SERPL: 5.2 (ref 7–25)
CALCIUM SPEC-SCNC: 8.7 MG/DL (ref 8.6–10.5)
CHLORIDE SERPL-SCNC: 88 MMOL/L (ref 98–107)
CO2 SERPL-SCNC: 27 MMOL/L (ref 22–29)
CREAT SERPL-MCNC: 4.26 MG/DL (ref 0.57–1)
DEPRECATED RDW RBC AUTO: 54.9 FL (ref 37–54)
EGFRCR SERPLBLD CKD-EPI 2021: 11.4 ML/MIN/1.73
EOSINOPHIL # BLD AUTO: 0.46 10*3/MM3 (ref 0–0.4)
EOSINOPHIL NFR BLD AUTO: 7.7 % (ref 0.3–6.2)
ERYTHROCYTE [DISTWIDTH] IN BLOOD BY AUTOMATED COUNT: 16.3 % (ref 12.3–15.4)
GLOBULIN UR ELPH-MCNC: 3.3 GM/DL
GLUCOSE SERPL-MCNC: 74 MG/DL (ref 65–99)
HBA1C MFR BLD: 8 % (ref 4.8–5.6)
HCT VFR BLD AUTO: 37.9 % (ref 34–46.6)
HGB BLD-MCNC: 12.5 G/DL (ref 12–15.9)
IMM GRANULOCYTES # BLD AUTO: 0.04 10*3/MM3 (ref 0–0.05)
IMM GRANULOCYTES NFR BLD AUTO: 0.7 % (ref 0–0.5)
INR PPP: 0.99 (ref 0.84–1.13)
LYMPHOCYTES # BLD AUTO: 1.46 10*3/MM3 (ref 0.7–3.1)
LYMPHOCYTES NFR BLD AUTO: 24.5 % (ref 19.6–45.3)
MCH RBC QN AUTO: 31.3 PG (ref 26.6–33)
MCHC RBC AUTO-ENTMCNC: 33 G/DL (ref 31.5–35.7)
MCV RBC AUTO: 94.8 FL (ref 79–97)
MONOCYTES # BLD AUTO: 0.71 10*3/MM3 (ref 0.1–0.9)
MONOCYTES NFR BLD AUTO: 11.9 % (ref 5–12)
NEUTROPHILS NFR BLD AUTO: 3.23 10*3/MM3 (ref 1.7–7)
NEUTROPHILS NFR BLD AUTO: 54.2 % (ref 42.7–76)
NRBC BLD AUTO-RTO: 0.3 /100 WBC (ref 0–0.2)
PLATELET # BLD AUTO: 213 10*3/MM3 (ref 140–450)
PMV BLD AUTO: 10.1 FL (ref 6–12)
POTASSIUM SERPL-SCNC: 4.9 MMOL/L (ref 3.5–5.2)
PROT SERPL-MCNC: 7.4 G/DL (ref 6–8.5)
PROTHROMBIN TIME: 13 SECONDS (ref 11.4–14.4)
QT INTERVAL: 402 MS
QTC INTERVAL: 452 MS
RBC # BLD AUTO: 4 10*6/MM3 (ref 3.77–5.28)
RH BLD: NEGATIVE
SODIUM SERPL-SCNC: 125 MMOL/L (ref 136–145)
WBC NRBC COR # BLD: 5.96 10*3/MM3 (ref 3.4–10.8)

## 2022-07-06 PROCEDURE — 87081 CULTURE SCREEN ONLY: CPT | Performed by: ORTHOPAEDIC SURGERY

## 2022-07-06 PROCEDURE — 36415 COLL VENOUS BLD VENIPUNCTURE: CPT

## 2022-07-06 PROCEDURE — 86900 BLOOD TYPING SEROLOGIC ABO: CPT

## 2022-07-06 PROCEDURE — 85610 PROTHROMBIN TIME: CPT | Performed by: ORTHOPAEDIC SURGERY

## 2022-07-06 PROCEDURE — 85025 COMPLETE CBC W/AUTO DIFF WBC: CPT | Performed by: ORTHOPAEDIC SURGERY

## 2022-07-06 PROCEDURE — 83036 HEMOGLOBIN GLYCOSYLATED A1C: CPT | Performed by: ORTHOPAEDIC SURGERY

## 2022-07-06 PROCEDURE — 93005 ELECTROCARDIOGRAM TRACING: CPT

## 2022-07-06 PROCEDURE — 80053 COMPREHEN METABOLIC PANEL: CPT | Performed by: ORTHOPAEDIC SURGERY

## 2022-07-06 PROCEDURE — 93010 ELECTROCARDIOGRAM REPORT: CPT | Performed by: INTERNAL MEDICINE

## 2022-07-06 PROCEDURE — 86901 BLOOD TYPING SEROLOGIC RH(D): CPT

## 2022-07-06 PROCEDURE — 85730 THROMBOPLASTIN TIME PARTIAL: CPT | Performed by: ORTHOPAEDIC SURGERY

## 2022-07-06 NOTE — PAT
Patient instructed to drink 20 ounces (or until full) of Gatorade and it needs to be completed 1 hour (for Main OR patients) or 2 hours (scheduled  section patients) before given arrival time for procedure (NO RED Gatorade)    Patient verbalized understanding.    Too early to draw type and screen in PAT.  Please obtain specimen in pre-op on the day of surgery.    Patient's surgeon called in a prescription for Benzol Peroxide 5% wash to [facility].  Patient instructed to  from [facility] pharmacy that was submitted electronically.  Verbal and written instructions given regarding proper use of the Benzoyl Peroxide wash given to patient and/or famlily during PAT visit. Patient/family also instructed to complete checklist and return it to Pre-op on the day of surgery.  Patient and/or family verbalized understanding.      Additionally, reinforced with patient to acquire this prescription from the [facility] retail pharmacy before leaving the hospital after PAT visit due to the potential unavailability at local pharmacies.      Patient denies any current skin issues.     [employer] pain pump patient informational handout given to patient.  Instructed patient to watch [employer] Patient Education Video regarding Peripheral Nerve Catheter that will be in place for upcoming surgery unless contraindicated. The video can be accessed using QR code noted on handout.  Patient agreed to watch video.  Stressed to patient to call [employer] Nursing Hotline  if patient has any questions or concerns after discharge.

## 2022-07-07 LAB — MRSA SPEC QL CULT: NORMAL

## 2022-07-08 ENCOUNTER — TELEPHONE (OUTPATIENT)
Dept: ORTHOPEDIC SURGERY | Facility: CLINIC | Age: 62
End: 2022-07-08

## 2022-07-08 DIAGNOSIS — M19.011 PRIMARY LOCALIZED OSTEOARTHROSIS OF RIGHT SHOULDER REGION: Primary | ICD-10-CM

## 2022-07-08 NOTE — TELEPHONE ENCOUNTER
Can you please send in bactroban to [facility] for the patient to  for surgery.    Ora CARCAMO

## 2022-07-11 ENCOUNTER — CLINICAL SUPPORT NO REQUIREMENTS (OUTPATIENT)
Dept: PREADMISSION TESTING | Facility: HOSPITAL | Age: 62
End: 2022-07-11

## 2022-07-11 DIAGNOSIS — M24.511 CONTRACTURE OF JOINT OF RIGHT SHOULDER REGION: ICD-10-CM

## 2022-07-11 DIAGNOSIS — N18.6 ESRD (END STAGE RENAL DISEASE): ICD-10-CM

## 2022-07-11 DIAGNOSIS — M25.511 RIGHT SHOULDER PAIN, UNSPECIFIED CHRONICITY: ICD-10-CM

## 2022-07-11 DIAGNOSIS — M75.21 BICEPS TENDINITIS OF RIGHT UPPER EXTREMITY: ICD-10-CM

## 2022-07-11 DIAGNOSIS — M19.011 PRIMARY LOCALIZED OSTEOARTHROSIS OF RIGHT SHOULDER REGION: ICD-10-CM

## 2022-07-11 PROCEDURE — C9803 HOPD COVID-19 SPEC COLLECT: HCPCS

## 2022-07-11 PROCEDURE — U0005 INFEC AGEN DETEC AMPLI PROBE: HCPCS

## 2022-07-11 PROCEDURE — U0004 COV-19 TEST NON-CDC HGH THRU: HCPCS

## 2022-07-12 ENCOUNTER — LAB (OUTPATIENT)
Dept: LAB | Facility: HOSPITAL | Age: 62
End: 2022-07-12

## 2022-07-12 ENCOUNTER — TELEPHONE (OUTPATIENT)
Dept: ORTHOPEDIC SURGERY | Facility: CLINIC | Age: 62
End: 2022-07-12

## 2022-07-12 DIAGNOSIS — M19.011 PRIMARY LOCALIZED OSTEOARTHROSIS OF RIGHT SHOULDER REGION: Primary | ICD-10-CM

## 2022-07-12 DIAGNOSIS — M19.011 PRIMARY LOCALIZED OSTEOARTHROSIS OF RIGHT SHOULDER REGION: ICD-10-CM

## 2022-07-12 LAB
ANION GAP SERPL CALCULATED.3IONS-SCNC: 14 MMOL/L (ref 5–15)
BUN SERPL-MCNC: 36 MG/DL (ref 8–23)
BUN/CREAT SERPL: 5.9 (ref 7–25)
CALCIUM SPEC-SCNC: 8.8 MG/DL (ref 8.6–10.5)
CHLORIDE SERPL-SCNC: 84 MMOL/L (ref 98–107)
CO2 SERPL-SCNC: 23 MMOL/L (ref 22–29)
CREAT SERPL-MCNC: 6.15 MG/DL (ref 0.57–1)
EGFRCR SERPLBLD CKD-EPI 2021: 7.3 ML/MIN/1.73
GLUCOSE SERPL-MCNC: 261 MG/DL (ref 65–99)
POTASSIUM SERPL-SCNC: 6.1 MMOL/L (ref 3.5–5.2)
SARS-COV-2 RNA PNL SPEC NAA+PROBE: NOT DETECTED
SODIUM SERPL-SCNC: 121 MMOL/L (ref 136–145)

## 2022-07-12 PROCEDURE — 80048 BASIC METABOLIC PNL TOTAL CA: CPT

## 2022-07-12 PROCEDURE — 36415 COLL VENOUS BLD VENIPUNCTURE: CPT

## 2022-07-12 NOTE — TELEPHONE ENCOUNTER
JOYA.  The lab called and said that her potassium is 6.1.  I did not get any other results.      Phyllis

## 2022-07-25 ENCOUNTER — APPOINTMENT (OUTPATIENT)
Dept: PREADMISSION TESTING | Facility: HOSPITAL | Age: 62
End: 2022-07-25

## 2022-08-18 ENCOUNTER — APPOINTMENT (OUTPATIENT)
Dept: PREADMISSION TESTING | Facility: HOSPITAL | Age: 62
End: 2022-08-18

## 2022-08-23 ENCOUNTER — TELEPHONE (OUTPATIENT)
Dept: ORTHOPEDIC SURGERY | Facility: CLINIC | Age: 62
End: 2022-08-23

## 2022-08-23 NOTE — TELEPHONE ENCOUNTER
Caller: Yashira Gudino    Relationship: Self    Best call back number: [phone]    What is the best time to reach you: ANY    What was the call regarding: PT HAS A SURGERY SCHEDULED ON 8.31.2022. SHE HAS NOT RECEIVED ANY DIRECTION AND/OR PAPER. SHE HAS LEFT AT LEAST 4 VOICE MAILS REQUESTING THIS INFORMATION. SHE WOULD LIKE A CALL TODAY.     Do you require a callback: YES - PLEASE CALL ASAP

## 2022-08-30 ENCOUNTER — HOSPITAL ENCOUNTER (INPATIENT)
Facility: HOSPITAL | Age: 62
LOS: 3 days | Discharge: NURSING, REHAB OR LONG-TERM CARE | End: 2022-09-05
Attending: INTERNAL MEDICINE | Admitting: ORTHOPAEDIC SURGERY

## 2022-08-30 ENCOUNTER — APPOINTMENT (OUTPATIENT)
Dept: NEPHROLOGY | Facility: HOSPITAL | Age: 62
End: 2022-08-30

## 2022-08-30 DIAGNOSIS — G89.18 ACUTE POSTOPERATIVE PAIN: Primary | ICD-10-CM

## 2022-08-30 DIAGNOSIS — M25.511 RIGHT SHOULDER PAIN, UNSPECIFIED CHRONICITY: ICD-10-CM

## 2022-08-30 DIAGNOSIS — N18.6 ESRD (END STAGE RENAL DISEASE): ICD-10-CM

## 2022-08-30 DIAGNOSIS — M24.511 CONTRACTURE OF JOINT OF RIGHT SHOULDER REGION: ICD-10-CM

## 2022-08-30 DIAGNOSIS — M19.011 PRIMARY LOCALIZED OSTEOARTHROSIS OF RIGHT SHOULDER REGION: ICD-10-CM

## 2022-08-30 DIAGNOSIS — M75.21 BICEPS TENDINITIS OF RIGHT UPPER EXTREMITY: ICD-10-CM

## 2022-08-30 PROBLEM — E11.9 DM2 (DIABETES MELLITUS, TYPE 2): Status: ACTIVE | Noted: 2022-08-30

## 2022-08-30 PROBLEM — I10 HTN (HYPERTENSION): Status: ACTIVE | Noted: 2022-08-30

## 2022-08-30 LAB
ALBUMIN SERPL-MCNC: 3.9 G/DL (ref 3.5–5.2)
ANION GAP SERPL CALCULATED.3IONS-SCNC: 12 MMOL/L (ref 5–15)
BASOPHILS # BLD AUTO: 0.05 10*3/MM3 (ref 0–0.2)
BASOPHILS NFR BLD AUTO: 0.9 % (ref 0–1.5)
BUN SERPL-MCNC: 43 MG/DL (ref 8–23)
BUN/CREAT SERPL: 5 (ref 7–25)
CALCIUM SPEC-SCNC: 8.6 MG/DL (ref 8.6–10.5)
CHLORIDE SERPL-SCNC: 93 MMOL/L (ref 98–107)
CO2 SERPL-SCNC: 24 MMOL/L (ref 22–29)
CREAT SERPL-MCNC: 8.68 MG/DL (ref 0.57–1)
DEPRECATED RDW RBC AUTO: 62.7 FL (ref 37–54)
EGFRCR SERPLBLD CKD-EPI 2021: 4.8 ML/MIN/1.73
EOSINOPHIL # BLD AUTO: 0.51 10*3/MM3 (ref 0–0.4)
EOSINOPHIL NFR BLD AUTO: 9 % (ref 0.3–6.2)
ERYTHROCYTE [DISTWIDTH] IN BLOOD BY AUTOMATED COUNT: 18.2 % (ref 12.3–15.4)
GLUCOSE BLDC GLUCOMTR-MCNC: 119 MG/DL (ref 70–130)
GLUCOSE SERPL-MCNC: 82 MG/DL (ref 65–99)
HAV IGM SERPL QL IA: NORMAL
HBV CORE IGM SERPL QL IA: NORMAL
HBV SURFACE AG SERPL QL IA: NORMAL
HCT VFR BLD AUTO: 35.8 % (ref 34–46.6)
HCV AB SER DONR QL: NORMAL
HGB BLD-MCNC: 11.7 G/DL (ref 12–15.9)
IMM GRANULOCYTES # BLD AUTO: 0.05 10*3/MM3 (ref 0–0.05)
IMM GRANULOCYTES NFR BLD AUTO: 0.9 % (ref 0–0.5)
LYMPHOCYTES # BLD AUTO: 1.93 10*3/MM3 (ref 0.7–3.1)
LYMPHOCYTES NFR BLD AUTO: 34.2 % (ref 19.6–45.3)
MCH RBC QN AUTO: 31.4 PG (ref 26.6–33)
MCHC RBC AUTO-ENTMCNC: 32.7 G/DL (ref 31.5–35.7)
MCV RBC AUTO: 96 FL (ref 79–97)
MONOCYTES # BLD AUTO: 0.73 10*3/MM3 (ref 0.1–0.9)
MONOCYTES NFR BLD AUTO: 12.9 % (ref 5–12)
NEUTROPHILS NFR BLD AUTO: 2.38 10*3/MM3 (ref 1.7–7)
NEUTROPHILS NFR BLD AUTO: 42.1 % (ref 42.7–76)
NRBC BLD AUTO-RTO: 0 /100 WBC (ref 0–0.2)
PHOSPHATE SERPL-MCNC: 7.3 MG/DL (ref 2.5–4.5)
PLATELET # BLD AUTO: 178 10*3/MM3 (ref 140–450)
PMV BLD AUTO: 10.2 FL (ref 6–12)
POTASSIUM SERPL-SCNC: 5.6 MMOL/L (ref 3.5–5.2)
RBC # BLD AUTO: 3.73 10*6/MM3 (ref 3.77–5.28)
SODIUM SERPL-SCNC: 129 MMOL/L (ref 136–145)
WBC NRBC COR # BLD: 5.65 10*3/MM3 (ref 3.4–10.8)

## 2022-08-30 PROCEDURE — 5A1D70Z PERFORMANCE OF URINARY FILTRATION, INTERMITTENT, LESS THAN 6 HOURS PER DAY: ICD-10-PCS | Performed by: INTERNAL MEDICINE

## 2022-08-30 PROCEDURE — 63710000001 AMLODIPINE 10 MG TABLET: Performed by: INTERNAL MEDICINE

## 2022-08-30 PROCEDURE — A9270 NON-COVERED ITEM OR SERVICE: HCPCS | Performed by: INTERNAL MEDICINE

## 2022-08-30 PROCEDURE — 63710000001 BUMETANIDE 1 MG TABLET: Performed by: INTERNAL MEDICINE

## 2022-08-30 PROCEDURE — 63710000001 CARVEDILOL 12.5 MG TABLET: Performed by: INTERNAL MEDICINE

## 2022-08-30 PROCEDURE — 63710000001 GABAPENTIN 300 MG CAPSULE: Performed by: INTERNAL MEDICINE

## 2022-08-30 PROCEDURE — 63710000001 HYDROCODONE-ACETAMINOPHEN 7.5-325 MG TABLET: Performed by: INTERNAL MEDICINE

## 2022-08-30 PROCEDURE — 63710000001 MELATONIN 5 MG TABLET: Performed by: INTERNAL MEDICINE

## 2022-08-30 PROCEDURE — 85025 COMPLETE CBC W/AUTO DIFF WBC: CPT | Performed by: INTERNAL MEDICINE

## 2022-08-30 PROCEDURE — 63710000001 INSULIN DETEMIR PER 5 UNITS: Performed by: INTERNAL MEDICINE

## 2022-08-30 PROCEDURE — 63710000001 CYCLOBENZAPRINE 10 MG TABLET: Performed by: INTERNAL MEDICINE

## 2022-08-30 PROCEDURE — 80074 ACUTE HEPATITIS PANEL: CPT | Performed by: INTERNAL MEDICINE

## 2022-08-30 PROCEDURE — 82962 GLUCOSE BLOOD TEST: CPT

## 2022-08-30 PROCEDURE — 63710000001 MUPIROCIN 2 % OINTMENT: Performed by: ORTHOPAEDIC SURGERY

## 2022-08-30 PROCEDURE — 80069 RENAL FUNCTION PANEL: CPT | Performed by: INTERNAL MEDICINE

## 2022-08-30 PROCEDURE — A9270 NON-COVERED ITEM OR SERVICE: HCPCS | Performed by: ORTHOPAEDIC SURGERY

## 2022-08-30 RX ORDER — NICOTINE POLACRILEX 4 MG
15 LOZENGE BUCCAL
Status: DISCONTINUED | OUTPATIENT
Start: 2022-08-30 | End: 2022-09-05 | Stop reason: HOSPADM

## 2022-08-30 RX ORDER — CYCLOBENZAPRINE HCL 10 MG
10 TABLET ORAL 3 TIMES DAILY PRN
COMMUNITY

## 2022-08-30 RX ORDER — HEPARIN SODIUM (PORCINE) LOCK FLUSH IV SOLN 100 UNIT/ML 100 UNIT/ML
3 SOLUTION INTRAVENOUS DAILY PRN
Status: DISCONTINUED | OUTPATIENT
Start: 2022-08-30 | End: 2022-09-05 | Stop reason: HOSPADM

## 2022-08-30 RX ORDER — CEFAZOLIN SODIUM 2 G/100ML
2 INJECTION, SOLUTION INTRAVENOUS ONCE
Status: COMPLETED | OUTPATIENT
Start: 2022-08-31 | End: 2022-08-31

## 2022-08-30 RX ORDER — HYDROCODONE BITARTRATE AND ACETAMINOPHEN 7.5; 325 MG/1; MG/1
1 TABLET ORAL EVERY 6 HOURS PRN
Status: DISCONTINUED | OUTPATIENT
Start: 2022-08-30 | End: 2022-08-31

## 2022-08-30 RX ORDER — SODIUM CHLORIDE 0.9 % (FLUSH) 0.9 %
10 SYRINGE (ML) INJECTION AS NEEDED
Status: DISCONTINUED | OUTPATIENT
Start: 2022-08-30 | End: 2022-08-31 | Stop reason: SDUPTHER

## 2022-08-30 RX ORDER — ALBUMIN (HUMAN) 12.5 G/50ML
SOLUTION INTRAVENOUS
Status: DISCONTINUED
Start: 2022-08-30 | End: 2022-08-30 | Stop reason: WASHOUT

## 2022-08-30 RX ORDER — PANTOPRAZOLE SODIUM 40 MG/1
40 TABLET, DELAYED RELEASE ORAL EVERY MORNING
Status: DISCONTINUED | OUTPATIENT
Start: 2022-08-31 | End: 2022-09-05 | Stop reason: HOSPADM

## 2022-08-30 RX ORDER — INSULIN LISPRO 100 [IU]/ML
0-9 INJECTION, SOLUTION INTRAVENOUS; SUBCUTANEOUS
Status: DISCONTINUED | OUTPATIENT
Start: 2022-08-30 | End: 2022-09-05 | Stop reason: HOSPADM

## 2022-08-30 RX ORDER — CHOLECALCIFEROL (VITAMIN D3) 125 MCG
10 CAPSULE ORAL NIGHTLY PRN
Status: DISCONTINUED | OUTPATIENT
Start: 2022-08-30 | End: 2022-09-05 | Stop reason: HOSPADM

## 2022-08-30 RX ORDER — SODIUM CHLORIDE 0.9 % (FLUSH) 0.9 %
20 SYRINGE (ML) INJECTION AS NEEDED
Status: DISCONTINUED | OUTPATIENT
Start: 2022-08-30 | End: 2022-09-05 | Stop reason: HOSPADM

## 2022-08-30 RX ORDER — BUMETANIDE 1 MG/1
4 TABLET ORAL 2 TIMES DAILY
Status: DISCONTINUED | OUTPATIENT
Start: 2022-08-30 | End: 2022-09-05 | Stop reason: HOSPADM

## 2022-08-30 RX ORDER — AMLODIPINE BESYLATE 10 MG/1
10 TABLET ORAL NIGHTLY
Status: DISCONTINUED | OUTPATIENT
Start: 2022-08-30 | End: 2022-09-05 | Stop reason: HOSPADM

## 2022-08-30 RX ORDER — SODIUM CHLORIDE 0.9 % (FLUSH) 0.9 %
10 SYRINGE (ML) INJECTION EVERY 12 HOURS SCHEDULED
Status: DISCONTINUED | OUTPATIENT
Start: 2022-08-30 | End: 2022-08-31 | Stop reason: SDUPTHER

## 2022-08-30 RX ORDER — PREGABALIN 75 MG/1
75 CAPSULE ORAL ONCE
Status: COMPLETED | OUTPATIENT
Start: 2022-08-31 | End: 2022-08-31

## 2022-08-30 RX ORDER — CARVEDILOL 12.5 MG/1
25 TABLET ORAL 2 TIMES DAILY WITH MEALS
Status: DISCONTINUED | OUTPATIENT
Start: 2022-08-30 | End: 2022-09-05 | Stop reason: HOSPADM

## 2022-08-30 RX ORDER — TRANEXAMIC ACID 10 MG/ML
1000 INJECTION, SOLUTION INTRAVENOUS ONCE
Status: COMPLETED | OUTPATIENT
Start: 2022-08-31 | End: 2022-08-31

## 2022-08-30 RX ORDER — ACETAMINOPHEN 500 MG
1000 TABLET ORAL ONCE
Status: COMPLETED | OUTPATIENT
Start: 2022-08-31 | End: 2022-08-31

## 2022-08-30 RX ORDER — CYCLOBENZAPRINE HCL 10 MG
10 TABLET ORAL 3 TIMES DAILY
Status: DISCONTINUED | OUTPATIENT
Start: 2022-08-30 | End: 2022-09-05 | Stop reason: HOSPADM

## 2022-08-30 RX ORDER — CITALOPRAM 20 MG/1
40 TABLET ORAL DAILY
Status: DISCONTINUED | OUTPATIENT
Start: 2022-08-31 | End: 2022-09-05 | Stop reason: HOSPADM

## 2022-08-30 RX ORDER — HEPARIN SODIUM 1000 [USP'U]/ML
1000 INJECTION, SOLUTION INTRAVENOUS; SUBCUTANEOUS AS NEEDED
Status: DISCONTINUED | OUTPATIENT
Start: 2022-08-30 | End: 2022-09-05 | Stop reason: HOSPADM

## 2022-08-30 RX ORDER — ALBUMIN (HUMAN) 12.5 G/50ML
12.5 SOLUTION INTRAVENOUS AS NEEDED
Status: ACTIVE | OUTPATIENT
Start: 2022-08-30 | End: 2022-08-31

## 2022-08-30 RX ORDER — ONDANSETRON 2 MG/ML
4 INJECTION INTRAMUSCULAR; INTRAVENOUS EVERY 6 HOURS PRN
Status: DISCONTINUED | OUTPATIENT
Start: 2022-08-30 | End: 2022-09-05 | Stop reason: HOSPADM

## 2022-08-30 RX ORDER — ACETAMINOPHEN 325 MG/1
650 TABLET ORAL EVERY 4 HOURS PRN
Status: DISCONTINUED | OUTPATIENT
Start: 2022-08-30 | End: 2022-08-31

## 2022-08-30 RX ORDER — SCOLOPAMINE TRANSDERMAL SYSTEM 1 MG/1
1 PATCH, EXTENDED RELEASE TRANSDERMAL CONTINUOUS
Status: ACTIVE | OUTPATIENT
Start: 2022-08-31 | End: 2022-09-03

## 2022-08-30 RX ORDER — SODIUM CHLORIDE 0.9 % (FLUSH) 0.9 %
10 SYRINGE (ML) INJECTION AS NEEDED
Status: DISCONTINUED | OUTPATIENT
Start: 2022-08-30 | End: 2022-09-05 | Stop reason: HOSPADM

## 2022-08-30 RX ORDER — DEXTROSE MONOHYDRATE 25 G/50ML
25 INJECTION, SOLUTION INTRAVENOUS
Status: DISCONTINUED | OUTPATIENT
Start: 2022-08-30 | End: 2022-09-05 | Stop reason: HOSPADM

## 2022-08-30 RX ORDER — ONDANSETRON 4 MG/1
4 TABLET, FILM COATED ORAL EVERY 6 HOURS PRN
Status: DISCONTINUED | OUTPATIENT
Start: 2022-08-30 | End: 2022-09-05 | Stop reason: HOSPADM

## 2022-08-30 RX ORDER — VANCOMYCIN HYDROCHLORIDE 1 G/200ML
15 INJECTION, SOLUTION INTRAVENOUS ONCE
Status: COMPLETED | OUTPATIENT
Start: 2022-08-31 | End: 2022-08-31

## 2022-08-30 RX ORDER — CETIRIZINE HYDROCHLORIDE 10 MG/1
5 TABLET ORAL DAILY
Status: DISCONTINUED | OUTPATIENT
Start: 2022-08-31 | End: 2022-09-05 | Stop reason: HOSPADM

## 2022-08-30 RX ORDER — GABAPENTIN 300 MG/1
600 CAPSULE ORAL EVERY 12 HOURS SCHEDULED
Status: DISCONTINUED | OUTPATIENT
Start: 2022-08-30 | End: 2022-09-05 | Stop reason: HOSPADM

## 2022-08-30 RX ORDER — SODIUM CHLORIDE 0.9 % (FLUSH) 0.9 %
10 SYRINGE (ML) INJECTION EVERY 12 HOURS SCHEDULED
Status: DISCONTINUED | OUTPATIENT
Start: 2022-08-30 | End: 2022-09-05 | Stop reason: HOSPADM

## 2022-08-30 RX ADMIN — CARVEDILOL 25 MG: 12.5 TABLET, FILM COATED ORAL at 16:59

## 2022-08-30 RX ADMIN — GABAPENTIN 600 MG: 300 CAPSULE ORAL at 21:48

## 2022-08-30 RX ADMIN — INSULIN DETEMIR 10 UNITS: 100 INJECTION, SOLUTION SUBCUTANEOUS at 20:16

## 2022-08-30 RX ADMIN — CYCLOBENZAPRINE 10 MG: 10 TABLET, FILM COATED ORAL at 21:49

## 2022-08-30 RX ADMIN — BUMETANIDE 4 MG: 1 TABLET ORAL at 20:16

## 2022-08-30 RX ADMIN — HYDROCODONE BITARTRATE AND ACETAMINOPHEN 1 TABLET: 7.5; 325 TABLET ORAL at 20:16

## 2022-08-30 RX ADMIN — Medication 10 ML: at 20:17

## 2022-08-30 RX ADMIN — Medication 10 MG: at 21:48

## 2022-08-30 RX ADMIN — MUPIROCIN 1 APPLICATION: 20 OINTMENT TOPICAL at 16:58

## 2022-08-30 RX ADMIN — AMLODIPINE BESYLATE 10 MG: 10 TABLET ORAL at 20:17

## 2022-08-31 ENCOUNTER — ANESTHESIA (OUTPATIENT)
Dept: PERIOP | Facility: HOSPITAL | Age: 62
End: 2022-08-31

## 2022-08-31 ENCOUNTER — APPOINTMENT (OUTPATIENT)
Dept: GENERAL RADIOLOGY | Facility: HOSPITAL | Age: 62
End: 2022-08-31

## 2022-08-31 ENCOUNTER — ANESTHESIA EVENT (OUTPATIENT)
Dept: PERIOP | Facility: HOSPITAL | Age: 62
End: 2022-08-31

## 2022-08-31 PROBLEM — G89.18 ACUTE POSTOPERATIVE PAIN: Status: ACTIVE | Noted: 2022-08-31

## 2022-08-31 LAB
ANION GAP SERPL CALCULATED.3IONS-SCNC: 11 MMOL/L (ref 5–15)
BUN SERPL-MCNC: 34 MG/DL (ref 8–23)
BUN/CREAT SERPL: 4.6 (ref 7–25)
CALCIUM SPEC-SCNC: 8.5 MG/DL (ref 8.6–10.5)
CHLORIDE SERPL-SCNC: 96 MMOL/L (ref 98–107)
CO2 SERPL-SCNC: 22 MMOL/L (ref 22–29)
CREAT SERPL-MCNC: 7.35 MG/DL (ref 0.57–1)
EGFRCR SERPLBLD CKD-EPI 2021: 5.9 ML/MIN/1.73
GLUCOSE BLDC GLUCOMTR-MCNC: 122 MG/DL (ref 70–130)
GLUCOSE BLDC GLUCOMTR-MCNC: 146 MG/DL (ref 70–130)
GLUCOSE BLDC GLUCOMTR-MCNC: 79 MG/DL (ref 70–130)
GLUCOSE SERPL-MCNC: 174 MG/DL (ref 65–99)
POTASSIUM SERPL-SCNC: 5.7 MMOL/L (ref 3.5–5.2)
SODIUM SERPL-SCNC: 129 MMOL/L (ref 136–145)

## 2022-08-31 PROCEDURE — 25010000002 FENTANYL CITRATE (PF) 50 MCG/ML SOLUTION: Performed by: ANESTHESIOLOGY

## 2022-08-31 PROCEDURE — 25010000002 VANCOMYCIN PER 500 MG: Performed by: ORTHOPAEDIC SURGERY

## 2022-08-31 PROCEDURE — A9270 NON-COVERED ITEM OR SERVICE: HCPCS | Performed by: ORTHOPAEDIC SURGERY

## 2022-08-31 PROCEDURE — 25010000002 FENTANYL CITRATE (PF) 50 MCG/ML SOLUTION

## 2022-08-31 PROCEDURE — A9270 NON-COVERED ITEM OR SERVICE: HCPCS | Performed by: INTERNAL MEDICINE

## 2022-08-31 PROCEDURE — 63710000001 PREGABALIN 75 MG CAPSULE: Performed by: ORTHOPAEDIC SURGERY

## 2022-08-31 PROCEDURE — 63710000001 PANTOPRAZOLE 40 MG TABLET DELAYED-RELEASE: Performed by: INTERNAL MEDICINE

## 2022-08-31 PROCEDURE — 82962 GLUCOSE BLOOD TEST: CPT

## 2022-08-31 PROCEDURE — 0RRJ0JZ REPLACEMENT OF RIGHT SHOULDER JOINT WITH SYNTHETIC SUBSTITUTE, OPEN APPROACH: ICD-10-PCS | Performed by: ORTHOPAEDIC SURGERY

## 2022-08-31 PROCEDURE — 63710000001 INSULIN DETEMIR PER 5 UNITS: Performed by: NURSE PRACTITIONER

## 2022-08-31 PROCEDURE — 63710000001 BUMETANIDE 1 MG TABLET: Performed by: ORTHOPAEDIC SURGERY

## 2022-08-31 PROCEDURE — 25010000002 CEFAZOLIN IN DEXTROSE 2-4 GM/100ML-% SOLUTION: Performed by: ORTHOPAEDIC SURGERY

## 2022-08-31 PROCEDURE — 63710000001 ACETAMINOPHEN 500 MG TABLET: Performed by: NURSE PRACTITIONER

## 2022-08-31 PROCEDURE — 23472 RECONSTRUCT SHOULDER JOINT: CPT | Performed by: ORTHOPAEDIC SURGERY

## 2022-08-31 PROCEDURE — 25010000002 PROPOFOL 10 MG/ML EMULSION: Performed by: ANESTHESIOLOGY

## 2022-08-31 PROCEDURE — 63710000001 ACETAMINOPHEN 325 MG TABLET: Performed by: INTERNAL MEDICINE

## 2022-08-31 PROCEDURE — 25010000002 ONDANSETRON PER 1 MG: Performed by: ORTHOPAEDIC SURGERY

## 2022-08-31 PROCEDURE — C1713 ANCHOR/SCREW BN/BN,TIS/BN: HCPCS | Performed by: ORTHOPAEDIC SURGERY

## 2022-08-31 PROCEDURE — 25010000002 ONDANSETRON PER 1 MG: Performed by: ANESTHESIOLOGY

## 2022-08-31 PROCEDURE — 63710000001 AMLODIPINE 10 MG TABLET: Performed by: ORTHOPAEDIC SURGERY

## 2022-08-31 PROCEDURE — 63710000001 CYCLOBENZAPRINE 10 MG TABLET: Performed by: ORTHOPAEDIC SURGERY

## 2022-08-31 PROCEDURE — C1776 JOINT DEVICE (IMPLANTABLE): HCPCS | Performed by: ORTHOPAEDIC SURGERY

## 2022-08-31 PROCEDURE — A9270 NON-COVERED ITEM OR SERVICE: HCPCS | Performed by: NURSE PRACTITIONER

## 2022-08-31 PROCEDURE — 25010000002 DEXAMETHASONE PER 1 MG: Performed by: ANESTHESIOLOGY

## 2022-08-31 PROCEDURE — 0LS30ZZ REPOSITION RIGHT UPPER ARM TENDON, OPEN APPROACH: ICD-10-PCS | Performed by: ORTHOPAEDIC SURGERY

## 2022-08-31 PROCEDURE — 63710000001 ACETAMINOPHEN 500 MG TABLET: Performed by: ORTHOPAEDIC SURGERY

## 2022-08-31 PROCEDURE — 80048 BASIC METABOLIC PNL TOTAL CA: CPT | Performed by: INTERNAL MEDICINE

## 2022-08-31 PROCEDURE — 63710000001 GABAPENTIN 300 MG CAPSULE: Performed by: ORTHOPAEDIC SURGERY

## 2022-08-31 PROCEDURE — 73020 X-RAY EXAM OF SHOULDER: CPT

## 2022-08-31 PROCEDURE — 63710000001 BUMETANIDE 1 MG TABLET: Performed by: INTERNAL MEDICINE

## 2022-08-31 PROCEDURE — 63710000001 CARVEDILOL 12.5 MG TABLET: Performed by: ORTHOPAEDIC SURGERY

## 2022-08-31 PROCEDURE — 25010000002 HYDROMORPHONE PER 4 MG: Performed by: ORTHOPAEDIC SURGERY

## 2022-08-31 PROCEDURE — 23472 RECONSTRUCT SHOULDER JOINT: CPT

## 2022-08-31 PROCEDURE — 63710000001 INSULIN DETEMIR PER 5 UNITS: Performed by: INTERNAL MEDICINE

## 2022-08-31 PROCEDURE — 63710000001 MUPIROCIN 2 % OINTMENT: Performed by: ORTHOPAEDIC SURGERY

## 2022-08-31 PROCEDURE — 63710000001 CARVEDILOL 12.5 MG TABLET: Performed by: INTERNAL MEDICINE

## 2022-08-31 PROCEDURE — 63710000001 MELATONIN 5 MG TABLET: Performed by: ORTHOPAEDIC SURGERY

## 2022-08-31 PROCEDURE — 63710000001 OXYCODONE 5 MG TABLET: Performed by: INTERNAL MEDICINE

## 2022-08-31 PROCEDURE — 63710000001 SCOPOLAMINE 1 MG/3DAYS PATCH 72 HOUR: Performed by: ORTHOPAEDIC SURGERY

## 2022-08-31 DEVICE — NUCLEUS
Type: IMPLANTABLE DEVICE | Site: SHOULDER | Status: FUNCTIONAL
Brand: TORNIER SIMPLICITI SHOULDER SYSTEM

## 2022-08-31 DEVICE — IMPLANTABLE DEVICE: Type: IMPLANTABLE DEVICE | Site: SHOULDER | Status: FUNCTIONAL

## 2022-08-31 DEVICE — SHLDR SIMPLICITI NUCLEUS HD GLEN TORNIER: Type: IMPLANTABLE DEVICE | Site: SHOULDER | Status: FUNCTIONAL

## 2022-08-31 DEVICE — IMPLANTABLE DEVICE
Type: IMPLANTABLE DEVICE | Site: SHOULDER | Status: FUNCTIONAL
Brand: AEQUALIS™ PERFORM

## 2022-08-31 DEVICE — DEPUY CMW 2 FAST SET BONE CEMENT 20G: Type: IMPLANTABLE DEVICE | Site: SHOULDER | Status: FUNCTIONAL

## 2022-08-31 RX ORDER — SODIUM CHLORIDE 0.9 % (FLUSH) 0.9 %
10 SYRINGE (ML) INJECTION AS NEEDED
Status: DISCONTINUED | OUTPATIENT
Start: 2022-08-31 | End: 2022-08-31 | Stop reason: HOSPADM

## 2022-08-31 RX ORDER — BUPIVACAINE HCL/0.9 % NACL/PF 0.125 %
PLASTIC BAG, INJECTION (ML) EPIDURAL AS NEEDED
Status: DISCONTINUED | OUTPATIENT
Start: 2022-08-31 | End: 2022-08-31 | Stop reason: SURG

## 2022-08-31 RX ORDER — LABETALOL HYDROCHLORIDE 5 MG/ML
5 INJECTION, SOLUTION INTRAVENOUS
Status: DISCONTINUED | OUTPATIENT
Start: 2022-08-31 | End: 2022-08-31 | Stop reason: HOSPADM

## 2022-08-31 RX ORDER — HYDROMORPHONE HYDROCHLORIDE 1 MG/ML
0.5 INJECTION, SOLUTION INTRAMUSCULAR; INTRAVENOUS; SUBCUTANEOUS
Status: DISCONTINUED | OUTPATIENT
Start: 2022-08-31 | End: 2022-08-31 | Stop reason: HOSPADM

## 2022-08-31 RX ORDER — HYDRALAZINE HYDROCHLORIDE 20 MG/ML
5 INJECTION INTRAMUSCULAR; INTRAVENOUS
Status: DISCONTINUED | OUTPATIENT
Start: 2022-08-31 | End: 2022-08-31 | Stop reason: HOSPADM

## 2022-08-31 RX ORDER — FENTANYL CITRATE 50 UG/ML
INJECTION, SOLUTION INTRAMUSCULAR; INTRAVENOUS
Status: COMPLETED
Start: 2022-08-31 | End: 2022-08-31

## 2022-08-31 RX ORDER — IPRATROPIUM BROMIDE AND ALBUTEROL SULFATE 2.5; .5 MG/3ML; MG/3ML
3 SOLUTION RESPIRATORY (INHALATION) ONCE AS NEEDED
Status: DISCONTINUED | OUTPATIENT
Start: 2022-08-31 | End: 2022-08-31 | Stop reason: HOSPADM

## 2022-08-31 RX ORDER — DROPERIDOL 2.5 MG/ML
0.62 INJECTION, SOLUTION INTRAMUSCULAR; INTRAVENOUS ONCE AS NEEDED
Status: DISCONTINUED | OUTPATIENT
Start: 2022-08-31 | End: 2022-08-31 | Stop reason: HOSPADM

## 2022-08-31 RX ORDER — ONDANSETRON 2 MG/ML
4 INJECTION INTRAMUSCULAR; INTRAVENOUS ONCE AS NEEDED
Status: DISCONTINUED | OUTPATIENT
Start: 2022-08-31 | End: 2022-08-31 | Stop reason: HOSPADM

## 2022-08-31 RX ORDER — SODIUM CHLORIDE 9 MG/ML
9 INJECTION, SOLUTION INTRAVENOUS ONCE
Status: COMPLETED | OUTPATIENT
Start: 2022-08-31 | End: 2022-08-31

## 2022-08-31 RX ORDER — SODIUM CHLORIDE 0.9 % (FLUSH) 0.9 %
10 SYRINGE (ML) INJECTION EVERY 12 HOURS SCHEDULED
Status: DISCONTINUED | OUTPATIENT
Start: 2022-08-31 | End: 2022-08-31 | Stop reason: HOSPADM

## 2022-08-31 RX ORDER — OXYCODONE HYDROCHLORIDE 5 MG/1
5 TABLET ORAL EVERY 4 HOURS PRN
Status: DISCONTINUED | OUTPATIENT
Start: 2022-08-31 | End: 2022-08-31

## 2022-08-31 RX ORDER — MIDAZOLAM HYDROCHLORIDE 1 MG/ML
1 INJECTION INTRAMUSCULAR; INTRAVENOUS
Status: DISCONTINUED | OUTPATIENT
Start: 2022-08-31 | End: 2022-08-31 | Stop reason: HOSPADM

## 2022-08-31 RX ORDER — FAMOTIDINE 20 MG/1
20 TABLET, FILM COATED ORAL
Status: DISCONTINUED | OUTPATIENT
Start: 2022-08-31 | End: 2022-08-31 | Stop reason: HOSPADM

## 2022-08-31 RX ORDER — NALOXONE HCL 0.4 MG/ML
0.4 VIAL (ML) INJECTION AS NEEDED
Status: DISCONTINUED | OUTPATIENT
Start: 2022-08-31 | End: 2022-08-31 | Stop reason: HOSPADM

## 2022-08-31 RX ORDER — SODIUM CHLORIDE 9 MG/ML
75 INJECTION, SOLUTION INTRAVENOUS CONTINUOUS
Status: DISCONTINUED | OUTPATIENT
Start: 2022-08-31 | End: 2022-08-31

## 2022-08-31 RX ORDER — LIDOCAINE HYDROCHLORIDE 10 MG/ML
0.5 INJECTION, SOLUTION EPIDURAL; INFILTRATION; INTRACAUDAL; PERINEURAL ONCE AS NEEDED
Status: DISCONTINUED | OUTPATIENT
Start: 2022-08-31 | End: 2022-08-31 | Stop reason: HOSPADM

## 2022-08-31 RX ORDER — NALOXONE HCL 0.4 MG/ML
0.1 VIAL (ML) INJECTION
Status: DISCONTINUED | OUTPATIENT
Start: 2022-08-31 | End: 2022-09-05 | Stop reason: HOSPADM

## 2022-08-31 RX ORDER — VANCOMYCIN HYDROCHLORIDE 1 G/200ML
15 INJECTION, SOLUTION INTRAVENOUS ONCE
Status: COMPLETED | OUTPATIENT
Start: 2022-09-01 | End: 2022-09-01

## 2022-08-31 RX ORDER — DIPHENHYDRAMINE HYDROCHLORIDE 50 MG/ML
25 INJECTION INTRAMUSCULAR; INTRAVENOUS EVERY 6 HOURS PRN
Status: DISCONTINUED | OUTPATIENT
Start: 2022-08-31 | End: 2022-09-05 | Stop reason: HOSPADM

## 2022-08-31 RX ORDER — KETAMINE HCL IN NACL, ISO-OSM 100MG/10ML
SYRINGE (ML) INJECTION AS NEEDED
Status: DISCONTINUED | OUTPATIENT
Start: 2022-08-31 | End: 2022-08-31 | Stop reason: SURG

## 2022-08-31 RX ORDER — HYDROMORPHONE HYDROCHLORIDE 1 MG/ML
0.5 INJECTION, SOLUTION INTRAMUSCULAR; INTRAVENOUS; SUBCUTANEOUS
Status: DISCONTINUED | OUTPATIENT
Start: 2022-08-31 | End: 2022-09-05 | Stop reason: HOSPADM

## 2022-08-31 RX ORDER — FENTANYL CITRATE 50 UG/ML
50 INJECTION, SOLUTION INTRAMUSCULAR; INTRAVENOUS
Status: DISCONTINUED | OUTPATIENT
Start: 2022-08-31 | End: 2022-08-31 | Stop reason: HOSPADM

## 2022-08-31 RX ORDER — OXYCODONE HYDROCHLORIDE 5 MG/1
10 TABLET ORAL EVERY 4 HOURS PRN
Status: DISCONTINUED | OUTPATIENT
Start: 2022-08-31 | End: 2022-09-05 | Stop reason: HOSPADM

## 2022-08-31 RX ORDER — MAGNESIUM HYDROXIDE 1200 MG/15ML
LIQUID ORAL AS NEEDED
Status: DISCONTINUED | OUTPATIENT
Start: 2022-08-31 | End: 2022-08-31 | Stop reason: HOSPADM

## 2022-08-31 RX ORDER — ROCURONIUM BROMIDE 10 MG/ML
INJECTION, SOLUTION INTRAVENOUS AS NEEDED
Status: DISCONTINUED | OUTPATIENT
Start: 2022-08-31 | End: 2022-08-31 | Stop reason: SURG

## 2022-08-31 RX ORDER — SODIUM CHLORIDE, SODIUM LACTATE, POTASSIUM CHLORIDE, CALCIUM CHLORIDE 600; 310; 30; 20 MG/100ML; MG/100ML; MG/100ML; MG/100ML
9 INJECTION, SOLUTION INTRAVENOUS CONTINUOUS PRN
Status: DISCONTINUED | OUTPATIENT
Start: 2022-08-31 | End: 2022-08-31

## 2022-08-31 RX ORDER — DEXAMETHASONE SODIUM PHOSPHATE 4 MG/ML
INJECTION, SOLUTION INTRA-ARTICULAR; INTRALESIONAL; INTRAMUSCULAR; INTRAVENOUS; SOFT TISSUE AS NEEDED
Status: DISCONTINUED | OUTPATIENT
Start: 2022-08-31 | End: 2022-08-31 | Stop reason: SURG

## 2022-08-31 RX ORDER — LIDOCAINE HYDROCHLORIDE 10 MG/ML
INJECTION, SOLUTION EPIDURAL; INFILTRATION; INTRACAUDAL; PERINEURAL AS NEEDED
Status: DISCONTINUED | OUTPATIENT
Start: 2022-08-31 | End: 2022-08-31 | Stop reason: SURG

## 2022-08-31 RX ORDER — ESMOLOL HYDROCHLORIDE 10 MG/ML
INJECTION INTRAVENOUS AS NEEDED
Status: DISCONTINUED | OUTPATIENT
Start: 2022-08-31 | End: 2022-08-31 | Stop reason: SURG

## 2022-08-31 RX ORDER — BUPIVACAINE HYDROCHLORIDE AND EPINEPHRINE 2.5; 5 MG/ML; UG/ML
INJECTION, SOLUTION INFILTRATION; PERINEURAL AS NEEDED
Status: DISCONTINUED | OUTPATIENT
Start: 2022-08-31 | End: 2022-08-31 | Stop reason: HOSPADM

## 2022-08-31 RX ORDER — MEPERIDINE HYDROCHLORIDE 25 MG/ML
12.5 INJECTION INTRAMUSCULAR; INTRAVENOUS; SUBCUTANEOUS
Status: DISCONTINUED | OUTPATIENT
Start: 2022-08-31 | End: 2022-08-31 | Stop reason: HOSPADM

## 2022-08-31 RX ORDER — PROMETHAZINE HYDROCHLORIDE 25 MG/1
25 TABLET ORAL ONCE AS NEEDED
Status: DISCONTINUED | OUTPATIENT
Start: 2022-08-31 | End: 2022-08-31 | Stop reason: HOSPADM

## 2022-08-31 RX ORDER — SODIUM CHLORIDE 0.9 % (FLUSH) 0.9 %
3-10 SYRINGE (ML) INJECTION AS NEEDED
Status: DISCONTINUED | OUTPATIENT
Start: 2022-08-31 | End: 2022-08-31 | Stop reason: HOSPADM

## 2022-08-31 RX ORDER — DIPHENHYDRAMINE HCL 25 MG
25 CAPSULE ORAL EVERY 6 HOURS PRN
Status: DISCONTINUED | OUTPATIENT
Start: 2022-08-31 | End: 2022-09-05 | Stop reason: HOSPADM

## 2022-08-31 RX ORDER — ONDANSETRON 2 MG/ML
INJECTION INTRAMUSCULAR; INTRAVENOUS AS NEEDED
Status: DISCONTINUED | OUTPATIENT
Start: 2022-08-31 | End: 2022-08-31 | Stop reason: SURG

## 2022-08-31 RX ORDER — DROPERIDOL 2.5 MG/ML
0.62 INJECTION, SOLUTION INTRAMUSCULAR; INTRAVENOUS
Status: DISCONTINUED | OUTPATIENT
Start: 2022-08-31 | End: 2022-08-31 | Stop reason: HOSPADM

## 2022-08-31 RX ORDER — ALBUMIN (HUMAN) 12.5 G/50ML
12.5 SOLUTION INTRAVENOUS AS NEEDED
Status: ACTIVE | OUTPATIENT
Start: 2022-09-01 | End: 2022-09-01

## 2022-08-31 RX ORDER — FENTANYL CITRATE 50 UG/ML
INJECTION, SOLUTION INTRAMUSCULAR; INTRAVENOUS AS NEEDED
Status: DISCONTINUED | OUTPATIENT
Start: 2022-08-31 | End: 2022-08-31 | Stop reason: SURG

## 2022-08-31 RX ORDER — ACETAMINOPHEN 500 MG
1000 TABLET ORAL EVERY 8 HOURS
Status: DISCONTINUED | OUTPATIENT
Start: 2022-08-31 | End: 2022-09-05 | Stop reason: HOSPADM

## 2022-08-31 RX ORDER — PROPOFOL 10 MG/ML
VIAL (ML) INTRAVENOUS AS NEEDED
Status: DISCONTINUED | OUTPATIENT
Start: 2022-08-31 | End: 2022-08-31 | Stop reason: SURG

## 2022-08-31 RX ORDER — PROMETHAZINE HYDROCHLORIDE 25 MG/1
25 SUPPOSITORY RECTAL ONCE AS NEEDED
Status: DISCONTINUED | OUTPATIENT
Start: 2022-08-31 | End: 2022-08-31 | Stop reason: HOSPADM

## 2022-08-31 RX ORDER — ACETAMINOPHEN 325 MG/1
650 TABLET ORAL EVERY 6 HOURS SCHEDULED
Status: DISCONTINUED | OUTPATIENT
Start: 2022-08-31 | End: 2022-08-31 | Stop reason: SDUPTHER

## 2022-08-31 RX ORDER — HEPARIN SODIUM 5000 [USP'U]/ML
5000 INJECTION, SOLUTION INTRAVENOUS; SUBCUTANEOUS EVERY 8 HOURS SCHEDULED
Status: DISCONTINUED | OUTPATIENT
Start: 2022-09-01 | End: 2022-09-05 | Stop reason: HOSPADM

## 2022-08-31 RX ORDER — HYDROCODONE BITARTRATE AND ACETAMINOPHEN 5; 325 MG/1; MG/1
1 TABLET ORAL ONCE AS NEEDED
Status: DISCONTINUED | OUTPATIENT
Start: 2022-08-31 | End: 2022-08-31 | Stop reason: HOSPADM

## 2022-08-31 RX ORDER — SODIUM CHLORIDE 0.9 % (FLUSH) 0.9 %
3 SYRINGE (ML) INJECTION EVERY 12 HOURS SCHEDULED
Status: DISCONTINUED | OUTPATIENT
Start: 2022-08-31 | End: 2022-08-31 | Stop reason: HOSPADM

## 2022-08-31 RX ORDER — SODIUM CHLORIDE 9 MG/ML
INJECTION, SOLUTION INTRAVENOUS CONTINUOUS PRN
Status: DISCONTINUED | OUTPATIENT
Start: 2022-08-31 | End: 2022-08-31 | Stop reason: SURG

## 2022-08-31 RX ORDER — OXYCODONE HYDROCHLORIDE 5 MG/1
5 TABLET ORAL EVERY 4 HOURS PRN
Status: DISCONTINUED | OUTPATIENT
Start: 2022-08-31 | End: 2022-09-05 | Stop reason: HOSPADM

## 2022-08-31 RX ORDER — AMOXICILLIN 250 MG
2 CAPSULE ORAL 2 TIMES DAILY PRN
Status: DISCONTINUED | OUTPATIENT
Start: 2022-08-31 | End: 2022-09-05 | Stop reason: HOSPADM

## 2022-08-31 RX ADMIN — FENTANYL CITRATE 25 MCG: 50 INJECTION, SOLUTION INTRAMUSCULAR; INTRAVENOUS at 10:15

## 2022-08-31 RX ADMIN — BUMETANIDE 4 MG: 1 TABLET ORAL at 20:45

## 2022-08-31 RX ADMIN — FENTANYL CITRATE 50 MCG: 50 INJECTION INTRAMUSCULAR; INTRAVENOUS at 13:41

## 2022-08-31 RX ADMIN — Medication 100 MCG: at 12:02

## 2022-08-31 RX ADMIN — FENTANYL CITRATE 25 MCG: 50 INJECTION, SOLUTION INTRAMUSCULAR; INTRAVENOUS at 11:09

## 2022-08-31 RX ADMIN — ACETAMINOPHEN 1000 MG: 500 TABLET, FILM COATED ORAL at 17:20

## 2022-08-31 RX ADMIN — GABAPENTIN 600 MG: 300 CAPSULE ORAL at 20:45

## 2022-08-31 RX ADMIN — CARVEDILOL 25 MG: 12.5 TABLET, FILM COATED ORAL at 07:54

## 2022-08-31 RX ADMIN — MUPIROCIN 1 APPLICATION: 20 OINTMENT TOPICAL at 07:37

## 2022-08-31 RX ADMIN — TRANEXAMIC ACID 1000 MG: 10 INJECTION, SOLUTION INTRAVENOUS at 10:26

## 2022-08-31 RX ADMIN — SCOPALAMINE 1 PATCH: 1 PATCH, EXTENDED RELEASE TRANSDERMAL at 09:02

## 2022-08-31 RX ADMIN — Medication 20 MG: at 10:21

## 2022-08-31 RX ADMIN — VANCOMYCIN HYDROCHLORIDE 1000 MG: 1 INJECTION, SOLUTION INTRAVENOUS at 09:17

## 2022-08-31 RX ADMIN — Medication 10 ML: at 20:43

## 2022-08-31 RX ADMIN — SODIUM CHLORIDE 9 ML/HR: 9 INJECTION, SOLUTION INTRAVENOUS at 09:22

## 2022-08-31 RX ADMIN — LIDOCAINE HYDROCHLORIDE 50 MG: 10 INJECTION, SOLUTION EPIDURAL; INFILTRATION; INTRACAUDAL; PERINEURAL at 10:15

## 2022-08-31 RX ADMIN — ROCURONIUM BROMIDE 10 MG: 50 INJECTION, SOLUTION INTRAVENOUS at 10:27

## 2022-08-31 RX ADMIN — CYCLOBENZAPRINE 10 MG: 10 TABLET, FILM COATED ORAL at 15:14

## 2022-08-31 RX ADMIN — BUMETANIDE 4 MG: 1 TABLET ORAL at 07:53

## 2022-08-31 RX ADMIN — ONDANSETRON 4 MG: 2 INJECTION INTRAMUSCULAR; INTRAVENOUS at 11:57

## 2022-08-31 RX ADMIN — CYCLOBENZAPRINE 10 MG: 10 TABLET, FILM COATED ORAL at 20:45

## 2022-08-31 RX ADMIN — ROCURONIUM BROMIDE 50 MG: 50 INJECTION, SOLUTION INTRAVENOUS at 10:15

## 2022-08-31 RX ADMIN — Medication 10 MG: at 20:45

## 2022-08-31 RX ADMIN — ROCURONIUM BROMIDE 10 MG: 50 INJECTION, SOLUTION INTRAVENOUS at 11:09

## 2022-08-31 RX ADMIN — CEFAZOLIN SODIUM 2 G: 2 INJECTION, SOLUTION INTRAVENOUS at 10:24

## 2022-08-31 RX ADMIN — SODIUM CHLORIDE: 9 INJECTION, SOLUTION INTRAVENOUS at 10:10

## 2022-08-31 RX ADMIN — CARVEDILOL 25 MG: 12.5 TABLET, FILM COATED ORAL at 17:20

## 2022-08-31 RX ADMIN — DEXAMETHASONE SODIUM PHOSPHATE 8 MG: 4 INJECTION, SOLUTION INTRA-ARTICULAR; INTRALESIONAL; INTRAMUSCULAR; INTRAVENOUS; SOFT TISSUE at 10:15

## 2022-08-31 RX ADMIN — ACETAMINOPHEN 1000 MG: 500 TABLET ORAL at 09:02

## 2022-08-31 RX ADMIN — OXYCODONE 10 MG: 5 TABLET ORAL at 21:35

## 2022-08-31 RX ADMIN — ACETAMINOPHEN 650 MG: 325 TABLET, FILM COATED ORAL at 00:47

## 2022-08-31 RX ADMIN — PANTOPRAZOLE SODIUM 40 MG: 40 TABLET, DELAYED RELEASE ORAL at 07:32

## 2022-08-31 RX ADMIN — Medication 10 ML: at 09:36

## 2022-08-31 RX ADMIN — ESMOLOL HYDROCHLORIDE 10 MG: 10 INJECTION, SOLUTION INTRAVENOUS at 10:15

## 2022-08-31 RX ADMIN — TRANEXAMIC ACID 1000 MG: 10 INJECTION, SOLUTION INTRAVENOUS at 11:59

## 2022-08-31 RX ADMIN — ONDANSETRON 4 MG: 2 INJECTION INTRAMUSCULAR; INTRAVENOUS at 15:13

## 2022-08-31 RX ADMIN — INSULIN DETEMIR 10 UNITS: 100 INJECTION, SOLUTION SUBCUTANEOUS at 08:17

## 2022-08-31 RX ADMIN — SUGAMMADEX 200 MG: 100 INJECTION, SOLUTION INTRAVENOUS at 11:57

## 2022-08-31 RX ADMIN — PREGABALIN 75 MG: 75 CAPSULE ORAL at 08:58

## 2022-08-31 RX ADMIN — PROPOFOL 25 MCG/KG/MIN: 10 INJECTION, EMULSION INTRAVENOUS at 10:29

## 2022-08-31 RX ADMIN — FENTANYL CITRATE 50 MCG: 50 INJECTION, SOLUTION INTRAMUSCULAR; INTRAVENOUS at 13:41

## 2022-08-31 RX ADMIN — PROPOFOL 100 MG: 10 INJECTION, EMULSION INTRAVENOUS at 10:15

## 2022-08-31 RX ADMIN — PROPOFOL 50 MG: 10 INJECTION, EMULSION INTRAVENOUS at 11:11

## 2022-08-31 RX ADMIN — HYDROMORPHONE HYDROCHLORIDE 0.5 MG: 1 INJECTION, SOLUTION INTRAMUSCULAR; INTRAVENOUS; SUBCUTANEOUS at 20:45

## 2022-08-31 RX ADMIN — HYDROMORPHONE HYDROCHLORIDE 0.5 MG: 1 INJECTION, SOLUTION INTRAMUSCULAR; INTRAVENOUS; SUBCUTANEOUS at 15:14

## 2022-08-31 RX ADMIN — HYDROMORPHONE HYDROCHLORIDE 0.5 MG: 1 INJECTION, SOLUTION INTRAMUSCULAR; INTRAVENOUS; SUBCUTANEOUS at 17:20

## 2022-08-31 RX ADMIN — AMLODIPINE BESYLATE 10 MG: 10 TABLET ORAL at 20:45

## 2022-08-31 RX ADMIN — INSULIN DETEMIR 10 UNITS: 100 INJECTION, SOLUTION SUBCUTANEOUS at 20:44

## 2022-08-31 NOTE — ANESTHESIA PREPROCEDURE EVALUATION
Anesthesia Evaluation     Patient summary reviewed and Nursing notes reviewed   no history of anesthetic complications:  NPO Solid Status: > 8 hours  NPO Liquid Status: > 2 hours           Airway   Mallampati: I  TM distance: >3 FB  Neck ROM: full  No difficulty expected  Dental    (+) poor dentition    Pulmonary     breath sounds clear to auscultation  Cardiovascular   Exercise tolerance: poor (<4 METS)    ECG reviewed  Rhythm: regular  Rate: normal    (+) hypertension well controlled,     ROS comment: EF 50%    Neuro/Psych  (+) numbness, psychiatric history Depression,    GI/Hepatic/Renal/Endo    (+)   renal disease ESRD and dialysis, diabetes mellitus type 1 poorly controlled using insulin,     Musculoskeletal     Abdominal   (-) obese    Abdomen: soft.   Substance History      OB/GYN          Other   arthritis,                      Anesthesia Plan    ASA 3     general     intravenous induction     Anesthetic plan, risks, benefits, and alternatives have been provided, discussed and informed consent has been obtained with: patient.    Plan discussed with CRNA.        CODE STATUS:    Code Status (Patient has no pulse and is not breathing): CPR (Attempt to Resuscitate)  Medical Interventions (Patient has pulse or is breathing): Full Support

## 2022-08-31 NOTE — ANESTHESIA POSTPROCEDURE EVALUATION
Patient: Yashira Gudino    Procedure Summary     Date: 08/31/22 Room / Location:  [facility]    Anesthesia Start: 1010 Anesthesia Stop: 1246    Procedure: RIGHT TOTAL SHOULDER ARTHROPLASTY (Right Shoulder) Diagnosis:       Primary localized osteoarthrosis of right shoulder region      Biceps tendinitis of right upper extremity      Contracture of joint of right shoulder region      Right shoulder pain, unspecified chronicity      ESRD (end stage renal disease) ([facility])      (Primary localized osteoarthrosis of right shoulder region [M19.011])      (Biceps tendinitis of right upper extremity [M75.21])      (Contracture of joint of right shoulder region [M24.511])      (Right shoulder pain, unspecified chronicity [M25.511])      (ESRD (end stage renal disease) ([facility]) [N18.6])    Surgeons: Obey Santiago MD Provider: Moose Hurley MD    Anesthesia Type: general ASA Status: 3          Anesthesia Type: general    Vitals  No vitals data found for the desired time range.          Post Anesthesia Care and Evaluation    Patient location during evaluation: PACU  Patient participation: complete - patient participated  Level of consciousness: sleepy but conscious  Pain management: adequate    Airway patency: patent  Anesthetic complications: No anesthetic complications  PONV Status: none  Cardiovascular status: hemodynamically stable and acceptable  Respiratory status: nonlabored ventilation, acceptable, nasal cannula and oral airway  Hydration status: acceptable

## 2022-08-31 NOTE — ADDENDUM NOTE
Addendum  created 08/31/22 1341 by Mark Mas CRNA    Alternative orders not taken and original order placed, Order list changed, Order sets accessed, Pharmacy for encounter modified

## 2022-08-31 NOTE — ANESTHESIA PROCEDURE NOTES
Airway  Urgency: elective    Date/Time: 8/31/2022 10:16 AM  Airway not difficult    General Information and Staff    Patient location during procedure: OR  SRNA: Francesca Sultana SRNA  Indications and Patient Condition  Indications for airway management: airway protection    Preoxygenated: yes  MILS not maintained throughout  Mask difficulty assessment: 1 - vent by mask    Final Airway Details  Final airway type: endotracheal airway      Successful airway: ETT  Cuffed: yes   Successful intubation technique: direct laryngoscopy  Endotracheal tube insertion site: oral  Blade: Bangura  Blade size: 3  ETT size (mm): 7.0  Cormack-Lehane Classification: grade I - full view of glottis  Placement verified by: chest auscultation and capnometry   Measured from: lips  ETT/EBT  to lips (cm): 20  Number of attempts at approach: 1  Assessment: lips, teeth, and gum same as pre-op and atraumatic intubation    Additional Comments  Negative epigastric sounds, Breath sound equal bilaterally with symmetric chest rise and fall

## 2022-09-01 ENCOUNTER — APPOINTMENT (OUTPATIENT)
Dept: NEPHROLOGY | Facility: HOSPITAL | Age: 62
End: 2022-09-01

## 2022-09-01 LAB
ANION GAP SERPL CALCULATED.3IONS-SCNC: 13 MMOL/L (ref 5–15)
BUN SERPL-MCNC: 55 MG/DL (ref 8–23)
BUN/CREAT SERPL: 6.6 (ref 7–25)
CALCIUM SPEC-SCNC: 8.5 MG/DL (ref 8.6–10.5)
CHLORIDE SERPL-SCNC: 89 MMOL/L (ref 98–107)
CO2 SERPL-SCNC: 18 MMOL/L (ref 22–29)
CREAT SERPL-MCNC: 8.34 MG/DL (ref 0.57–1)
EGFRCR SERPLBLD CKD-EPI 2021: 5.1 ML/MIN/1.73
GLUCOSE BLDC GLUCOMTR-MCNC: 123 MG/DL (ref 70–130)
GLUCOSE BLDC GLUCOMTR-MCNC: 152 MG/DL (ref 70–130)
GLUCOSE BLDC GLUCOMTR-MCNC: 163 MG/DL (ref 70–130)
GLUCOSE BLDC GLUCOMTR-MCNC: 170 MG/DL (ref 70–130)
GLUCOSE BLDC GLUCOMTR-MCNC: 203 MG/DL (ref 70–130)
GLUCOSE BLDC GLUCOMTR-MCNC: 245 MG/DL (ref 70–130)
GLUCOSE BLDC GLUCOMTR-MCNC: 489 MG/DL (ref 70–130)
GLUCOSE SERPL-MCNC: 544 MG/DL (ref 65–99)
HCT VFR BLD AUTO: 36.1 % (ref 34–46.6)
HGB BLD-MCNC: 11.5 G/DL (ref 12–15.9)
POTASSIUM SERPL-SCNC: 3.5 MMOL/L (ref 3.5–5.2)
POTASSIUM SERPL-SCNC: 8.2 MMOL/L (ref 3.5–5.2)
SODIUM SERPL-SCNC: 120 MMOL/L (ref 136–145)

## 2022-09-01 PROCEDURE — 80048 BASIC METABOLIC PNL TOTAL CA: CPT | Performed by: ORTHOPAEDIC SURGERY

## 2022-09-01 PROCEDURE — 97165 OT EVAL LOW COMPLEX 30 MIN: CPT | Performed by: OCCUPATIONAL THERAPIST

## 2022-09-01 PROCEDURE — 82962 GLUCOSE BLOOD TEST: CPT

## 2022-09-01 PROCEDURE — 63710000001 MUPIROCIN 2 % OINTMENT 1 G TUBE: Performed by: ORTHOPAEDIC SURGERY

## 2022-09-01 PROCEDURE — 63710000001 MELATONIN 5 MG TABLET: Performed by: ORTHOPAEDIC SURGERY

## 2022-09-01 PROCEDURE — 85014 HEMATOCRIT: CPT | Performed by: ORTHOPAEDIC SURGERY

## 2022-09-01 PROCEDURE — 63710000001 INSULIN DETEMIR PER 5 UNITS: Performed by: NURSE PRACTITIONER

## 2022-09-01 PROCEDURE — A9270 NON-COVERED ITEM OR SERVICE: HCPCS | Performed by: NURSE PRACTITIONER

## 2022-09-01 PROCEDURE — A9270 NON-COVERED ITEM OR SERVICE: HCPCS | Performed by: ORTHOPAEDIC SURGERY

## 2022-09-01 PROCEDURE — 97116 GAIT TRAINING THERAPY: CPT

## 2022-09-01 PROCEDURE — 63710000001 GABAPENTIN 300 MG CAPSULE: Performed by: ORTHOPAEDIC SURGERY

## 2022-09-01 PROCEDURE — 63710000001 CITALOPRAM 20 MG TABLET: Performed by: ORTHOPAEDIC SURGERY

## 2022-09-01 PROCEDURE — 25010000002 VANCOMYCIN PER 500 MG: Performed by: ORTHOPAEDIC SURGERY

## 2022-09-01 PROCEDURE — 85018 HEMOGLOBIN: CPT | Performed by: ORTHOPAEDIC SURGERY

## 2022-09-01 PROCEDURE — P9047 ALBUMIN (HUMAN), 25%, 50ML: HCPCS

## 2022-09-01 PROCEDURE — 25010000002 ALBUMIN HUMAN 25% PER 50 ML

## 2022-09-01 PROCEDURE — 99024 POSTOP FOLLOW-UP VISIT: CPT | Performed by: ORTHOPAEDIC SURGERY

## 2022-09-01 PROCEDURE — 63710000001 SODIUM ZIRCONIUM CYCLOSILICATE 10 G PACK: Performed by: ORTHOPAEDIC SURGERY

## 2022-09-01 PROCEDURE — 63710000001 CARVEDILOL 12.5 MG TABLET: Performed by: ORTHOPAEDIC SURGERY

## 2022-09-01 PROCEDURE — 63710000001 BUMETANIDE 1 MG TABLET: Performed by: ORTHOPAEDIC SURGERY

## 2022-09-01 PROCEDURE — 63710000001 CYCLOBENZAPRINE 10 MG TABLET: Performed by: ORTHOPAEDIC SURGERY

## 2022-09-01 PROCEDURE — 63710000001 ACETAMINOPHEN 500 MG TABLET: Performed by: NURSE PRACTITIONER

## 2022-09-01 PROCEDURE — 63710000001 PANTOPRAZOLE 40 MG TABLET DELAYED-RELEASE: Performed by: ORTHOPAEDIC SURGERY

## 2022-09-01 PROCEDURE — A9270 NON-COVERED ITEM OR SERVICE: HCPCS | Performed by: INTERNAL MEDICINE

## 2022-09-01 PROCEDURE — 25010000002 HEPARIN (PORCINE) PER 1000 UNITS: Performed by: ORTHOPAEDIC SURGERY

## 2022-09-01 PROCEDURE — 63710000001 INSULIN LISPRO (HUMAN) PER 5 UNITS: Performed by: INTERNAL MEDICINE

## 2022-09-01 PROCEDURE — 63710000001 OXYCODONE 5 MG TABLET: Performed by: INTERNAL MEDICINE

## 2022-09-01 PROCEDURE — 63710000001 CETIRIZINE 10 MG TABLET: Performed by: ORTHOPAEDIC SURGERY

## 2022-09-01 PROCEDURE — 63710000001 INSULIN LISPRO (HUMAN) PER 5 UNITS: Performed by: ORTHOPAEDIC SURGERY

## 2022-09-01 PROCEDURE — 97162 PT EVAL MOD COMPLEX 30 MIN: CPT

## 2022-09-01 PROCEDURE — P9047 ALBUMIN (HUMAN), 25%, 50ML: HCPCS | Performed by: INTERNAL MEDICINE

## 2022-09-01 PROCEDURE — 97535 SELF CARE MNGMENT TRAINING: CPT | Performed by: OCCUPATIONAL THERAPIST

## 2022-09-01 PROCEDURE — 63710000001 AMLODIPINE 10 MG TABLET: Performed by: ORTHOPAEDIC SURGERY

## 2022-09-01 PROCEDURE — 25010000002 HYDROMORPHONE PER 4 MG: Performed by: ORTHOPAEDIC SURGERY

## 2022-09-01 PROCEDURE — 97110 THERAPEUTIC EXERCISES: CPT | Performed by: OCCUPATIONAL THERAPIST

## 2022-09-01 PROCEDURE — 25010000002 ALBUMIN HUMAN 25% PER 50 ML: Performed by: INTERNAL MEDICINE

## 2022-09-01 PROCEDURE — 84132 ASSAY OF SERUM POTASSIUM: CPT | Performed by: INTERNAL MEDICINE

## 2022-09-01 RX ORDER — ALBUMIN (HUMAN) 12.5 G/50ML
SOLUTION INTRAVENOUS
Status: COMPLETED
Start: 2022-09-01 | End: 2022-09-01

## 2022-09-01 RX ORDER — ALBUMIN (HUMAN) 12.5 G/50ML
12.5 SOLUTION INTRAVENOUS AS NEEDED
Status: CANCELLED | OUTPATIENT
Start: 2022-09-02 | End: 2022-09-02

## 2022-09-01 RX ORDER — INSULIN LISPRO 100 [IU]/ML
3 INJECTION, SOLUTION INTRAVENOUS; SUBCUTANEOUS
Status: DISCONTINUED | OUTPATIENT
Start: 2022-09-01 | End: 2022-09-05 | Stop reason: HOSPADM

## 2022-09-01 RX ADMIN — ALBUMIN HUMAN 25 G: 0.25 SOLUTION INTRAVENOUS at 09:01

## 2022-09-01 RX ADMIN — OXYCODONE 10 MG: 5 TABLET ORAL at 18:31

## 2022-09-01 RX ADMIN — AMLODIPINE BESYLATE 10 MG: 10 TABLET ORAL at 21:08

## 2022-09-01 RX ADMIN — CITALOPRAM HYDROBROMIDE 40 MG: 20 TABLET ORAL at 11:45

## 2022-09-01 RX ADMIN — CYCLOBENZAPRINE 10 MG: 10 TABLET, FILM COATED ORAL at 21:08

## 2022-09-01 RX ADMIN — CARVEDILOL 25 MG: 12.5 TABLET, FILM COATED ORAL at 17:00

## 2022-09-01 RX ADMIN — BUMETANIDE 4 MG: 1 TABLET ORAL at 11:45

## 2022-09-01 RX ADMIN — ACETAMINOPHEN 1000 MG: 500 TABLET, FILM COATED ORAL at 17:00

## 2022-09-01 RX ADMIN — HYDROMORPHONE HYDROCHLORIDE 0.5 MG: 1 INJECTION, SOLUTION INTRAMUSCULAR; INTRAVENOUS; SUBCUTANEOUS at 12:06

## 2022-09-01 RX ADMIN — INSULIN LISPRO 9 UNITS: 100 INJECTION, SOLUTION INTRAVENOUS; SUBCUTANEOUS at 07:50

## 2022-09-01 RX ADMIN — CETIRIZINE HYDROCHLORIDE 5 MG: 10 TABLET, FILM COATED ORAL at 11:45

## 2022-09-01 RX ADMIN — Medication 10 MG: at 21:08

## 2022-09-01 RX ADMIN — INSULIN LISPRO 3 UNITS: 100 INJECTION, SOLUTION INTRAVENOUS; SUBCUTANEOUS at 12:07

## 2022-09-01 RX ADMIN — PANTOPRAZOLE SODIUM 40 MG: 40 TABLET, DELAYED RELEASE ORAL at 11:45

## 2022-09-01 RX ADMIN — OXYCODONE 10 MG: 5 TABLET ORAL at 01:31

## 2022-09-01 RX ADMIN — GABAPENTIN 600 MG: 300 CAPSULE ORAL at 11:46

## 2022-09-01 RX ADMIN — INSULIN DETEMIR 20 UNITS: 100 INJECTION, SOLUTION SUBCUTANEOUS at 07:51

## 2022-09-01 RX ADMIN — ALBUMIN HUMAN 25 G: 0.25 SOLUTION INTRAVENOUS at 09:00

## 2022-09-01 RX ADMIN — INSULIN LISPRO 2 UNITS: 100 INJECTION, SOLUTION INTRAVENOUS; SUBCUTANEOUS at 12:07

## 2022-09-01 RX ADMIN — OXYCODONE 10 MG: 5 TABLET ORAL at 13:08

## 2022-09-01 RX ADMIN — INSULIN LISPRO 3 UNITS: 100 INJECTION, SOLUTION INTRAVENOUS; SUBCUTANEOUS at 17:00

## 2022-09-01 RX ADMIN — INSULIN DETEMIR 10 UNITS: 100 INJECTION, SOLUTION SUBCUTANEOUS at 21:08

## 2022-09-01 RX ADMIN — ACETAMINOPHEN 1000 MG: 500 TABLET, FILM COATED ORAL at 11:45

## 2022-09-01 RX ADMIN — VANCOMYCIN HYDROCHLORIDE 1000 MG: 1 INJECTION, SOLUTION INTRAVENOUS at 12:44

## 2022-09-01 RX ADMIN — HYDROMORPHONE HYDROCHLORIDE 0.5 MG: 1 INJECTION, SOLUTION INTRAMUSCULAR; INTRAVENOUS; SUBCUTANEOUS at 01:31

## 2022-09-01 RX ADMIN — Medication 10 ML: at 12:07

## 2022-09-01 RX ADMIN — CYCLOBENZAPRINE 10 MG: 10 TABLET, FILM COATED ORAL at 11:46

## 2022-09-01 RX ADMIN — ACETAMINOPHEN 1000 MG: 500 TABLET, FILM COATED ORAL at 00:26

## 2022-09-01 RX ADMIN — HEPARIN SODIUM 1000 UNITS: 1000 INJECTION INTRAVENOUS; SUBCUTANEOUS at 11:09

## 2022-09-01 RX ADMIN — Medication 10 ML: at 21:12

## 2022-09-01 RX ADMIN — HEPARIN SODIUM 5000 UNITS: 5000 INJECTION INTRAVENOUS; SUBCUTANEOUS at 21:08

## 2022-09-01 RX ADMIN — HYDROMORPHONE HYDROCHLORIDE 0.5 MG: 1 INJECTION, SOLUTION INTRAMUSCULAR; INTRAVENOUS; SUBCUTANEOUS at 06:30

## 2022-09-01 RX ADMIN — OXYCODONE 10 MG: 5 TABLET ORAL at 06:34

## 2022-09-01 RX ADMIN — HYDROMORPHONE HYDROCHLORIDE 0.5 MG: 1 INJECTION, SOLUTION INTRAMUSCULAR; INTRAVENOUS; SUBCUTANEOUS at 21:09

## 2022-09-01 RX ADMIN — GABAPENTIN 600 MG: 300 CAPSULE ORAL at 21:09

## 2022-09-01 RX ADMIN — MUPIROCIN 1 APPLICATION: 20 OINTMENT TOPICAL at 06:13

## 2022-09-01 RX ADMIN — CYCLOBENZAPRINE 10 MG: 10 TABLET, FILM COATED ORAL at 17:00

## 2022-09-01 RX ADMIN — HEPARIN SODIUM 5000 UNITS: 5000 INJECTION INTRAVENOUS; SUBCUTANEOUS at 13:06

## 2022-09-01 RX ADMIN — HEPARIN SODIUM 5000 UNITS: 5000 INJECTION INTRAVENOUS; SUBCUTANEOUS at 06:13

## 2022-09-01 RX ADMIN — BUMETANIDE 4 MG: 1 TABLET ORAL at 21:07

## 2022-09-01 RX ADMIN — SODIUM ZIRCONIUM CYCLOSILICATE 10 G: 10 POWDER, FOR SUSPENSION ORAL at 12:07

## 2022-09-01 RX ADMIN — HYDROMORPHONE HYDROCHLORIDE 0.5 MG: 1 INJECTION, SOLUTION INTRAMUSCULAR; INTRAVENOUS; SUBCUTANEOUS at 17:00

## 2022-09-01 RX ADMIN — INSULIN LISPRO 4 UNITS: 100 INJECTION, SOLUTION INTRAVENOUS; SUBCUTANEOUS at 17:04

## 2022-09-01 RX ADMIN — CARVEDILOL 25 MG: 12.5 TABLET, FILM COATED ORAL at 11:45

## 2022-09-01 NOTE — THERAPY EVALUATION
Patient Name: Yashira Gudino  : 1960    MRN: [id number]                              Today's Date: 2022       Admit Date: 2022    Visit Dx:     ICD-10-CM ICD-9-CM   1. ESRD (end stage renal disease) ([facility])  N18.6 585.6   2. Primary localized osteoarthrosis of right shoulder region  M19.011 715.11   3. Contracture of joint of right shoulder region  M24.511 718.41   4. Biceps tendinitis of right upper extremity  M75.21 726.12   5. Right shoulder pain, unspecified chronicity  M25.511 719.41     Patient Active Problem List   Diagnosis   • Primary localized osteoarthrosis of right shoulder region   • Contracture of joint of right shoulder region   • Biceps tendinitis of right upper extremity   • Right shoulder pain   • ESRD (end stage renal disease) ([facility])   • HTN (hypertension)   • DM2 (diabetes mellitus, type 2) ([facility])   • Acute postoperative pain     Past Medical History:   Diagnosis Date   • Arthritis    • Charcot-Bertha-Tooth disease    • Chronic kidney disease (CKD), stage IV (severe) ([facility])    • Depression    • Diabetes ([facility])    • Dialysis patient ([facility])    • Fibromyalgia    • History of MRSA infection 2020    left finger, left foot   • Hypertension    • Ileostomy in place ([facility])    • Osteoporosis      Past Surgical History:   Procedure Laterality Date   • ANKLE SURGERY     •  SECTION     • COLON RESECTION     • COLONOSCOPY     • COLOSTOMY     • FOOT SURGERY Left     excise Small Toe   • HAND SURGERY Left     Excise IF   • NECK SURGERY     • TONSILLECTOMY     • TOTAL SHOULDER ARTHROPLASTY Right 2022    Procedure: TOTAL SHOULDER ARTHROPLASTY RIGHT;  Surgeon: Obey Santiago MD;  Location: [facility];  Service: Orthopedics;  Laterality: Right;   • TUBAL ABDOMINAL LIGATION        General Information     Row Name 22 1725          OT Time and Intention    Document Type evaluation  -AR     Mode of Treatment individual therapy;occupational therapy  -AR     Row Name 22 1728           General Information    Patient Profile Reviewed yes  -AR     Prior Level of Function independent:;all household mobility;community mobility;gait;transfer;ADL's;dependent:;driving  used rollator, h/o recent falls  -AR     Existing Precautions/Restrictions non-weight bearing;right;shoulder;other (see comments);fall  Donjoy Ultra III sling with pillow, dialysis catheter L chest  -AR     Barriers to Rehab medically complex;previous functional deficit  -AR     Row Name 09/01/22 1725          Living Environment    People in Home significant other  -AR     Row Name 09/01/22 1725          Home Main Entrance    Number of Stairs, Main Entrance two  -AR     Stair Railings, Main Entrance railing on left side (ascending)  -AR     Row Name 09/01/22 1725          Stairs Within Home, Primary    Number of Stairs, Within Home, Primary none  -AR     Row Name 09/01/22 1725          Cognition    Orientation Status (Cognition) oriented x 4  -AR     Row Name 09/01/22 1725          Safety Issues, Functional Mobility    Safety Issues Affecting Function (Mobility) ability to follow commands;awareness of need for assistance;impulsivity;at risk behavior observed;insight into deficits/self-awareness;safety precaution awareness;safety precautions follow-through/compliance  -AR     Impairments Affecting Function (Mobility) balance;endurance/activity tolerance;coordination;pain;strength  -AR           User Key  (r) = Recorded By, (t) = Taken By, (c) = Cosigned By    Initials Name Provider Type    Luciana Mcarthur OT Occupational Therapist                 Mobility/ADL's     Row Name 09/01/22 1730          Bed Mobility    Comment, (Bed Mobility) Pt received up in chair, educated pt on importance of maintaining NWB RUE AAT and safe sleeping position  -AR     Row Name 09/01/22 1730          Transfers    Comment, (Transfers) Deferred as pt received IV pain medication during session  -AR     Row Name 09/01/22 1730          Activities of Daily  Living    BADL Assessment/Intervention bathing;upper body dressing  -AR     Row Name 09/01/22 1730          Mobility    Extremity Weight-bearing Status right upper extremity  -AR     Right Upper Extremity (Weight-bearing Status) non weight-bearing (NWB)  -AR     Row Name 09/01/22 1730          Bathing Assessment/Intervention    Comment, (Bathing) Educated pt and SO on R shoulder precautions and R axilla care to maintain  -AR     Row Name 09/01/22 1730          Upper Body Dressing Assessment/Training    Eden Level (Upper Body Dressing) doff;don;dependent (less than 25% patient effort)  sling  -AR     Position (Upper Body Dressing) supported sitting  -AR     Comment, (Upper Body Dressing) Educated pt and SO on R shoulder precautions, ADL retraining to maintain and sling management. SO able to secure straps on sling with min assist. Sling adjusted for proper fit.  -AR           User Key  (r) = Recorded By, (t) = Taken By, (c) = Cosigned By    Initials Name Provider Type    Luciana Mcarthur, OT Occupational Therapist               Obj/Interventions     Row Name 09/01/22 1733          Sensory Assessment (Somatosensory)    Sensory Assessment (Somatosensory) right UE  -AR     Sensory Subjective Reports numbness  -AR     Row Name 09/01/22 1733          Range of Motion Comprehensive    Comment, General Range of Motion LUE WFL, R elbow/wrist/hand WFL  -AR     Row Name 09/01/22 1733          Strength Comprehensive (MMT)    Comment, General Manual Muscle Testing (MMT) Assessment LUE WFL, RUE deferred  -AR     Row Name 09/01/22 1733          Elbow/Forearm (Therapeutic Exercise)    Elbow/Forearm (Therapeutic Exercise) AAROM (active assistive range of motion)  -AR     Elbow/Forearm AAROM (Therapeutic Exercise) right;flexion;extension;supination;pronation;sitting;10 repetitions  -AR     Row Name 09/01/22 1733          Wrist (Therapeutic Exercise)    Wrist (Therapeutic Exercise) AROM (active range of motion)  -AR      Wrist AROM (Therapeutic Exercise) right;flexion;extension;10 repetitions  -AR     Row Name 09/01/22 1733          Hand (Therapeutic Exercise)    Hand (Therapeutic Exercise) AROM (active range of motion)  -AR     Hand AROM/AAROM (Therapeutic Exercise) right;finger flexion;finger extension;10 repetitions  -AR     Row Name 09/01/22 1733          Motor Skills    Therapeutic Exercise elbow/forearm;wrist;hand  Issued and reviewed written HEP  -AR     Row Name 09/01/22 1733          Balance    Balance Assessment sitting static balance;sitting dynamic balance;standing static balance;standing dynamic balance  -AR     Static Sitting Balance supervision  -AR     Dynamic Sitting Balance supervision  -AR     Position, Sitting Balance sitting in chair  -AR           User Key  (r) = Recorded By, (t) = Taken By, (c) = Cosigned By    Initials Name Provider Type    Luciana Mcarthur, OT Occupational Therapist               Goals/Plan     Row Name 09/01/22 1738          Transfer Goal 1 (OT)    Activity/Assistive Device (Transfer Goal 1, OT) sit-to-stand/stand-to-sit;toilet  AD per PT recommendation  -AR     Levy Level/Cues Needed (Transfer Goal 1, OT) verbal cues required;minimum assist (75% or more patient effort)  -AR     Time Frame (Transfer Goal 1, OT) long term goal (LTG);by discharge  -AR     Progress/Outcome (Transfer Goal 1, OT) goal ongoing  -AR     [facility] Name 09/01/22 1738          Dressing Goal 1 (OT)    Activity/Device (Dressing Goal 1, OT) upper body dressing  -AR     Levy/Cues Needed (Dressing Goal 1, OT) moderate assist (50-74% patient effort);verbal cues required  -AR     Time Frame (Dressing Goal 1, OT) long term goal (LTG);by discharge  -AR     Progress/Outcome (Dressing Goal 1, OT) goal ongoing  -AR     Row Name 09/01/22 1738          ROM Goal 1 (OT)    ROM Goal 1 (OT) Pt will complete RUE HEP within physician parameters with supervision  -AR     Time Frame (ROM Goal 1, OT) long term goal (LTG);by  discharge  -AR     Progress/Outcome (ROM Goal 1, OT) goal ongoing  -AR     Row Name 09/01/22 173          Problem Specific Goal 1 (OT)    Problem Specific Goal 1 (OT) Pt will maintain R shoulder precautions during ADL activity with max 4 vc  -AR     Time Frame (Problem Specific Goal 1, OT) long term goal (LTG);by discharge  -AR     Progress/Outcome (Problem Specific Goal 1, OT) goal ongoing  -AR     Row Name 09/01/22 1739          Therapy Assessment/Plan (OT)    Planned Therapy Interventions (OT) BADL retraining;edema control/reduction;functional balance retraining;IADL retraining;neuromuscular control/coordination retraining;occupation/activity based interventions;orthotic fabrication/fitting/training;patient/caregiver education/training;ROM/therapeutic exercise;transfer/mobility retraining  -AR           User Key  (r) = Recorded By, (t) = Taken By, (c) = Cosigned By    Initials Name Provider Type    Luciana Mcarthur, OT Occupational Therapist               Clinical Impression     Row Name 09/01/22 7361          Pain Assessment    Pretreatment Pain Rating 9/10  -AR     Posttreatment Pain Rating 8/10  -AR     Pain Location - Side/Orientation Right  -AR     Pain Location generalized  -AR     Pain Location - shoulder  -AR     Pain Intervention(s) Medication (See MAR);Cold applied;Repositioned  -AR     Row Name 09/01/22 9797          Plan of Care Review    Plan of Care Reviewed With patient;significant other  -AR     Outcome Evaluation OT educated pt and SO on R shoulder precautions, ADL retraining to maintain and RUE HEP. She required dependence sling management. Pt limited with 9/10 shoulder pain and IV pain medication administered during session, deferred transfer training. Pt lives with SO who works from 5am-7pm and pt has HD 3x/week. Recommend IPR, pt and family in agreement.  -AR     Row Name 09/01/22 0836          Therapy Assessment/Plan (OT)    Rehab Potential (OT) good, to achieve stated therapy goals   -AR     Criteria for Skilled Therapeutic Interventions Met (OT) yes  -AR     Therapy Frequency (OT) daily  -AR     Row Name 09/01/22 1735          Therapy Plan Review/Discharge Plan (OT)    Anticipated Discharge Disposition (OT) inpatient rehabilitation facility  -AR     Row Name 09/01/22 1735          Vital Signs    Pre Patient Position Sitting  -AR     Intra Patient Position Sitting  -AR     Post Patient Position Sitting  -AR     Row Name 09/01/22 1735          Positioning and Restraints    Pre-Treatment Position sitting in chair/recliner  -AR     Post Treatment Position chair  -AR     In Chair notified nsg;sitting;call light within reach;encouraged to call for assist;exit alarm on;with family/caregiver;with brace  -AR           User Key  (r) = Recorded By, (t) = Taken By, (c) = Cosigned By    Initials Name Provider Type    Luciana Mcarthur, OT Occupational Therapist               Outcome Measures     Row Name 09/01/22 1740          How much help from another is currently needed...    Putting on and taking off regular lower body clothing? 2  -AR     Bathing (including washing, rinsing, and drying) 2  -AR     Toileting (which includes using toilet bed pan or urinal) 2  -AR     Putting on and taking off regular upper body clothing 2  -AR     Taking care of personal grooming (such as brushing teeth) 3  -AR     Eating meals 3  -AR     AM-PAC 6 Clicks Score (OT) 14  -AR     Row Name 09/01/22 1602 09/01/22 1207       How much help from another person do you currently need...    Turning from your back to your side while in flat bed without using bedrails? 3  -ES 4  -AM    Moving from lying on back to sitting on the side of a flat bed without bedrails? 3  -ES 4  -AM    Moving to and from a bed to a chair (including a wheelchair)? 2  -ES 3  -AM    Standing up from a chair using your arms (e.g., wheelchair, bedside chair)? 2  -ES 3  -AM    Climbing 3-5 steps with a railing? 2  -ES 2  -AM    To walk in hospital room? 2   -ES 3  -AM    AM-PAC 6 Clicks Score (PT) 14  -ES 19  -AM    Highest level of mobility 4 --> Transferred to chair/commode  -ES 6 --> Walked 10 steps or more  -AM    Row Name 09/01/22 1740 09/01/22 1602       Functional Assessment    Outcome Measure Options AM-PAC 6 Clicks Daily Activity (OT)  -AR AM-PAC 6 Clicks Basic Mobility (PT)  -ES          User Key  (r) = Recorded By, (t) = Taken By, (c) = Cosigned By    Initials Name Provider Type    Luciana Mcarthur, OT Occupational Therapist    Gabi Narvaez, RN Registered Nurse    Taniya Aldrich, PT Physical Therapist                Occupational Therapy Education                 Title: PT OT SLP Therapies (Done)     Topic: Occupational Therapy (Done)     Point: ADL training (Done)     Description:   Instruct learner(s) on proper safety adaptation and remediation techniques during self care or transfers.   Instruct in proper use of assistive devices.              Learning Progress Summary           Patient Eager, E,TB,D,H, VU,NR by AR at 9/1/2022 1741   Significant Other Eager, E,TB,D,H, VU,NR by AR at 9/1/2022 1741                   Point: Home exercise program (Done)     Description:   Instruct learner(s) on appropriate technique for monitoring, assisting and/or progressing therapeutic exercises/activities.              Learning Progress Summary           Patient Eager, E,TB,D,H, VU,NR by AR at 9/1/2022 1741   Significant Other Eager, E,TB,D,H, VU,NR by AR at 9/1/2022 1741                   Point: Precautions (Done)     Description:   Instruct learner(s) on prescribed precautions during self-care and functional transfers.              Learning Progress Summary           Patient Eager, E,TB,D,H, VU,NR by AR at 9/1/2022 1741   Significant Other Eager, E,TB,D,H, VU,NR by AR at 9/1/2022 1741                   Point: Body mechanics (Done)     Description:   Instruct learner(s) on proper positioning and spine alignment during self-care, functional mobility  activities and/or exercises.              Learning Progress Summary           Patient Eager, E,TB,D,H, VU,NR by AR at 9/1/2022 1741   Significant Other Eager, E,TB,D,H, VU,NR by AR at 9/1/2022 1741                               User Key     Initials Effective Dates Name Provider Type Discipline    AR 06/16/21 -  Luciana Purcell OT Occupational Therapist OT              OT Recommendation and Plan  Planned Therapy Interventions (OT): BADL retraining, edema control/reduction, functional balance retraining, IADL retraining, neuromuscular control/coordination retraining, occupation/activity based interventions, orthotic fabrication/fitting/training, patient/caregiver education/training, ROM/therapeutic exercise, transfer/mobility retraining  Therapy Frequency (OT): daily  Plan of Care Review  Plan of Care Reviewed With: patient, significant other  Outcome Evaluation: OT educated pt and SO on R shoulder precautions, ADL retraining to maintain and RUE HEP. She required dependence sling management. Pt limited with 9/10 shoulder pain and IV pain medication administered during session, deferred transfer training. Pt lives with SO who works from 5am-7pm and pt has HD 3x/week. Recommend IPR, pt and family in agreement.     Time Calculation:    Time Calculation- OT     Row Name 09/01/22 1741 09/01/22 1605          Time Calculation- OT    OT Start Time 1635  -AR --            Timed Charges    20296 - OT Therapeutic Exercise Minutes 11  -AR --     69835 - Gait Training Minutes  -- 10  -ES     22684 - OT Self Care/Mgmt Minutes 13  -AR --            Untimed Charges    OT Eval/Re-eval Minutes 44  -AR --            Total Minutes    Timed Charges Total Minutes 24  -AR 10  -ES     Untimed Charges Total Minutes 44  -AR --      Total Minutes 68  -AR 10  -ES           User Key  (r) = Recorded By, (t) = Taken By, (c) = Cosigned By    Initials Name Provider Type    Luciana Mcarthur OT Occupational Therapist    EZEQUIEL Garces  Taniya, PT Physical Therapist              Therapy Charges for Today     Code Description Service Date Service Provider Modifiers Qty    87263281711 HC OT THER PROC EA 15 MIN 9/1/2022 Luciana Purcell OT GO 1    42625479885 HC OT SELF CARE/MGMT/TRAIN EA 15 MIN 9/1/2022 Luciana Purcell OT GO 1    94231293528 HC OT EVAL LOW COMPLEXITY 3 9/1/2022 Luciana Purcell OT GO 1               Luciana Purcell OT  9/1/2022

## 2022-09-01 NOTE — PROGRESS NOTES
"IM progress note      Yashira Gudino  [id number]  1960     LOS: 0 days     Attending: Joan Chavarria MD    Primary Care Provider: Karen Noguera MD      Chief Complaint/Reason for visit: Right shoulder pain    Subjective   Feels ok. Seen on HD .  Fair pain control.  No nausea, vomiting, or shortness of breath.  Significant blood glucose fluctuations observed this morning.  A combination of patient being brittle diabetic and having gone on dialysis and received insulin dosing.    Objective        Visit Vitals  BP 97/72 (BP Location: Left arm, Patient Position: Lying)   Pulse 95   Temp 98.2 °F (36.8 °C) (Oral)   Resp 16   Ht 157.5 cm (62\")   Wt 67.8 kg (149 lb 7.6 oz)   SpO2 94%   BMI 27.34 kg/m²     Temp (24hrs), Av.5 °F (36.4 °C), Min:97.1 °F (36.2 °C), Max:98.6 °F (37 °C)      Nutrition: PO    Respiratory: RA    Occupational Therapy: pending    Physical Exam:     General Appearance:    Alert, cooperative, in no acute distress   Head:    Normocephalic, without obvious abnormality, atraumatic    Lungs:     Normal effort, symmetric chest rise, no crepitus, clear to      auscultation bilaterally             Heart:    Regular rhythm and normal rate, normal S1 and S2   Abdomen:     Soft and benign with pink ileostomy and output in stoma bag   Extremities:  RUE sling.  Surgical dressing CDI.  Good movement and cap refill right digits   Pulses:   Pulses palpable and equal bilaterally   Skin:   No bleeding, bruising or rash   Neurologic:    Cranial nerves 2 - 12 grossly intact     Results Review:     I reviewed the patient's new clinical results.   Results from last 7 days   Lab Units 22  0516 22  1413   WBC 10*3/mm3  --  5.65   HEMOGLOBIN g/dL 11.5* 11.7*   HEMATOCRIT % 36.1 35.8   PLATELETS 10*3/mm3  --  178     Results from last 7 days   Lab Units 22  0620 22  0647 22  1413   SODIUM mmol/L 120* 129* 129*   POTASSIUM mmol/L 8.2* 5.7* 5.6*   CHLORIDE mmol/L 89* 96* 93* "   CO2 mmol/L 18.0* 22.0 24.0   BUN mg/dL 55* 34* 43*   CREATININE mg/dL 8.34* 7.35* 8.68*   CALCIUM mg/dL 8.5* 8.5* 8.6   GLUCOSE mg/dL 544* 174* 82     I reviewed the patient's new imaging including images and reports.    All medications reviewed.   acetaminophen, 1,000 mg, Oral, Q8H  albumin human, , ,   amLODIPine, 10 mg, Oral, Nightly  bumetanide, 4 mg, Oral, BID  carvedilol, 25 mg, Oral, BID With Meals  cetirizine, 5 mg, Oral, Daily  citalopram, 40 mg, Oral, Daily  cyclobenzaprine, 10 mg, Oral, TID  gabapentin, 600 mg, Oral, Q12H  heparin (porcine), 5,000 Units, Subcutaneous, Q8H  insulin detemir, 10 Units, Subcutaneous, Nightly  insulin detemir, 20 Units, Subcutaneous, QAM  insulin lispro, 0-9 Units, Subcutaneous, TID AC  Insulin Lispro, 3 Units, Subcutaneous, TID With Meals  mupirocin, 1 application, Nasal, Q12H  pantoprazole, 40 mg, Oral, QAM  sodium chloride, 10 mL, Intravenous, Q12H  sodium zirconium cyclosilicate, 10 g, Oral, Daily  vancomycin, 15 mg/kg, Intravenous, Once        Assessment & Plan    Hyperkalemia, being managed with dialysis    Primary localized osteoarthrosis of right shoulder region    Status post right total shoulder arthroplasty    Contracture of joint of right shoulder region    Biceps tendinitis of right upper extremity    Right shoulder pain    ESRD (end stage renal disease) ([facility])    HTN (hypertension)    DM2 (diabetes mellitus, type 2) (formerly [facility])    Acute postoperative pain      Plan  1. PT/OT- RUE sling  2. Pain control-prns. Added multimodals   3. IS-encouraged  4. DVT proph- mechs/heparin  5. Bowel regimen  6. Monitor post-op labs  7. DC planning for home vs rehab. CM following    - Hypertension:  Resume home medications as appropriate, formulary substitution when indicated.  Holding parameters.  Prn medications for elevated blood pressure.     -DM type 2, brittle.  Hgb A1C 8  FSBG AC/HS, (q 6 when NPO), along with correction humalog.  Long acting insulin.  Prandial Humalog 3 units  added as well    -ESRD  - nephrology following  - HD currently.  Following potassium      Joan Chavarria MD  09/01/22  10:03 EDT

## 2022-09-01 NOTE — PLAN OF CARE
Goal Outcome Evaluation:           Progress: improving     Pt has been understandably c/o shoulder pain frequently throughout night. PRN meds effective per pt. FSBS increased +300 points from yesterday (489). Pt asymptomatic minus elevated BP, which is related to R shoulder pain. Pt awaiting Dialysis, with no tfurther complaints. Notified  Dr. May this morning and is awaiting reconfirmation of Lab  results

## 2022-09-01 NOTE — PLAN OF CARE
Goal Outcome Evaluation:  Plan of Care Reviewed With: patient, significant other           Outcome Evaluation: OT educated pt and SO on R shoulder precautions, ADL retraining to maintain and RUE HEP. She required dependence sling management. Pt limited with 9/10 shoulder pain and IV pain medication administered during session, deferred transfer training. Pt lives with SO who works from 5am-7pm and pt has HD 3x/week. Recommend IPR, pt and family in agreement.

## 2022-09-01 NOTE — PROGRESS NOTES
"   LOS: 0 days    Patient Care Team:  Karen Noguera MD as PCP - General (Family Medicine)    Reason For Visit:  F/U ESRD. SEEN ON DIALYSIS  Subjective           Review of Systems:    Pulm: No soa   CV:  No CP      Objective     acetaminophen, 1,000 mg, Oral, Q8H  albumin human, , ,   amLODIPine, 10 mg, Oral, Nightly  bumetanide, 4 mg, Oral, BID  carvedilol, 25 mg, Oral, BID With Meals  cetirizine, 5 mg, Oral, Daily  citalopram, 40 mg, Oral, Daily  cyclobenzaprine, 10 mg, Oral, TID  gabapentin, 600 mg, Oral, Q12H  heparin (porcine), 5,000 Units, Subcutaneous, Q8H  insulin detemir, 10 Units, Subcutaneous, Nightly  insulin detemir, 20 Units, Subcutaneous, QAM  insulin lispro, 0-9 Units, Subcutaneous, TID AC  mupirocin, 1 application, Nasal, Q12H  pantoprazole, 40 mg, Oral, QAM  sodium chloride, 10 mL, Intravenous, Q12H  sodium zirconium cyclosilicate, 10 g, Oral, Daily  vancomycin, 15 mg/kg, Intravenous, Once      Scopolamine, 1 patch          Vital Signs:  Blood pressure 132/85, pulse 93, temperature 98.2 °F (36.8 °C), temperature source Oral, resp. rate 16, height 157.5 cm (62\"), weight 67.8 kg (149 lb 7.6 oz), SpO2 94 %.    Flowsheet Rows    Flowsheet Row First Filed Value   Admission Height 157.5 cm (62\") Documented at 08/30/2022 1512   Admission Weight 67.8 kg (149 lb 7.6 oz) Documented at 08/30/2022 1512          08/31 0701 - 09/01 0700  In: 1000 [I.V.:1000]  Out: 200     Physical Exam:    General Appearance: NAD, alert and cooperative, Ox3. THD CATH  Eyes: PER, conjunctivae and sclerae normal, no icterus  Lungs: respirations regular and unlabored, no crepitus, clear to auscultation  Heart/CV: regular rhythm & normal rate, no murmur, no gallop, no rub and no edema  Abdomen: not distended, soft, non-tender, no masses,  bowel sounds present  Skin: No rash, Warm and dry    Radiology:      Renal Imaging:        Labs:  Results from last 7 days   Lab Units 09/01/22  0516 08/30/22  1413   WBC 10*3/mm3  --  5.65 "   HEMOGLOBIN g/dL 11.5* 11.7*   HEMATOCRIT % 36.1 35.8   PLATELETS 10*3/mm3  --  178     Results from last 7 days   Lab Units 09/01/22  0620 08/31/22  0647 08/30/22  1413   SODIUM mmol/L 120* 129* 129*   POTASSIUM mmol/L 8.2* 5.7* 5.6*   CHLORIDE mmol/L 89* 96* 93*   CO2 mmol/L 18.0* 22.0 24.0   BUN mg/dL 55* 34* 43*   CREATININE mg/dL 8.34* 7.35* 8.68*   CALCIUM mg/dL 8.5* 8.5* 8.6   PHOSPHORUS mg/dL  --   --  7.3*   ALBUMIN g/dL  --   --  3.90     Results from last 7 days   Lab Units 09/01/22  0620   GLUCOSE mg/dL 544*                       Estimated Creatinine Clearance: 6.5 mL/min (A) (by C-G formula based on SCr of 8.34 mg/dL (H)).      Assessment       Primary localized osteoarthrosis of right shoulder region    Contracture of joint of right shoulder region    Biceps tendinitis of right upper extremity    Right shoulder pain    ESRD (end stage renal disease) ([facility])    HTN (hypertension)    DM2 (diabetes mellitus, type 2) ([facility])    Acute postoperative pain            Impression: ESRD. SEVERE HYPERKALEMIA. ANEMIA.            Recommendations: HD TODAY AND AGAIN 9/2/22      Nelson Shelby MD  09/01/22  09:10 EDT

## 2022-09-01 NOTE — PROGRESS NOTES
Referring Provider: Joan Chavarria MD     Orthopedic Shoulder Surgery Note - Obey Santiago MD    Chief Complaint: Status post right total shoulder arthroplasty.  Date of surgery 8/31/2022      Subjective:  The patient rested comfortably overnight with an expected amount of postoperative pain.  No events overnight.     She was in dialysis early this morning I revisited her around lunchtime today.  She is doing well she is considering options of discharge home versus transfer to [facility].  She will need a facility that performs dialysis.    PHYSICAL THERAPY PROGRESS  Outcome Evaluation: PT eval completed. Pt performed bed mobility with supervision, but required Valeria +1 to manage equipment for STS trasfer and ambulation. Pt demonstrated significantly decreased safety awareness despite education of post-op protocol. Pt ambulated 50' with SPC and Valeria + chair follow and required max VC and tactile cues for safety and sequencing. BLE sensation was intact but pt ambulated with decreased proprioception which also contributed to balance deficits. Due to balance deficits and decreased ability to properly use SPC, it is not recommended to continue ambulating with SPC at this time. After using bathroom with PT assist, pt removed peripheral IV line. RN notified. Due to current functional deficits, PT rec IRF upon d/c at this time. (09/01/22 1546)     Medications/Allergies:    Current Facility-Administered Medications:   •  acetaminophen (TYLENOL) tablet 1,000 mg, 1,000 mg, Oral, Q8H, Luciana Harvey, APRN, 1,000 mg at 09/01/22 1145  •  albumin human 25 % IV SOLN 12.5 g, 12.5 g, Intravenous, PRN, Nelson Shelby MD, 25 g at 09/01/22 0901  •  amLODIPine (NORVASC) tablet 10 mg, 10 mg, Oral, Nightly, Obey Santiago MD, 10 mg at 08/31/22 2045  •  bumetanide (BUMEX) tablet 4 mg, 4 mg, Oral, BID, Obey Santiago MD, 4 mg at 09/01/22 1145  •  carvedilol (COREG) tablet 25 mg, 25 mg, Oral, BID With  Meals, Obey Santiago MD, 25 mg at 09/01/22 1145  •  cetirizine (zyrTEC) tablet 5 mg, 5 mg, Oral, Daily, Obey Santiago MD, 5 mg at 09/01/22 1145  •  citalopram (CeleXA) tablet 40 mg, 40 mg, Oral, Daily, Obey Santiago MD, 40 mg at 09/01/22 1145  •  cyclobenzaprine (FLEXERIL) tablet 10 mg, 10 mg, Oral, TID, Obey Santiago MD, 10 mg at 09/01/22 1146  •  dextrose (D50W) (25 g/50 mL) IV injection 25 g, 25 g, Intravenous, Q15 Min PRN, Obey Santiago MD  •  dextrose (GLUTOSE) oral gel 15 g, 15 g, Oral, Q15 Min PRN, Obey Santiago MD  •  diphenhydrAMINE (BENADRYL) capsule 25 mg, 25 mg, Oral, Q6H PRN **OR** diphenhydrAMINE (BENADRYL) injection 25 mg, 25 mg, Intravenous, Q6H PRN, Obey Santiago MD  •  gabapentin (NEURONTIN) capsule 600 mg, 600 mg, Oral, Q12H, Obey Santiago MD, 600 mg at 09/01/22 1146  •  glucagon (human recombinant) (GLUCAGEN DIAGNOSTIC) injection 1 mg, 1 mg, Intramuscular, Q15 Min PRN, Obey Santiago MD  •  heparin (porcine) 5000 UNIT/ML injection 5,000 Units, 5,000 Units, Subcutaneous, Q8H, Obey Santiago MD, 5,000 Units at 09/01/22 1306  •  heparin (porcine) injection 1,000 Units, 1,000 Units, Intracatheter, PRN, Obey Santiago MD, 1,000 Units at 09/01/22 1109  •  heparin injection 300 Units, 3 mL, Intravenous, Daily PRN, Obey Santiago MD  •  HYDROmorphone (DILAUDID) injection 0.5 mg, 0.5 mg, Intravenous, Q2H PRN, 0.5 mg at 09/01/22 1206 **AND** naloxone (NARCAN) injection 0.1 mg, 0.1 mg, Intravenous, Q5 Min PRN, Obey Santiago MD  •  insulin detemir (LEVEMIR) injection 10 Units, 10 Units, Subcutaneous, Nightly, Luciana Harvey APRN, 10 Units at 08/31/22 2044  •  insulin detemir (LEVEMIR) injection 20 Units, 20 Units, Subcutaneous, QAM, Luciana Harvey APRN, 20 Units at 09/01/22 0751  •  Insulin Lispro (humaLOG) injection 0-9 Units, 0-9 Units, Subcutaneous, TID AC, Obey Santiago MD, 2 Units at  09/01/22 1207  •  Insulin Lispro (humaLOG) injection 3 Units, 3 Units, Subcutaneous, TID With Meals, Joan Chavarria MD, 3 Units at 09/01/22 1207  •  melatonin tablet 10 mg, 10 mg, Oral, Nightly PRN, Obey Santiago MD, 10 mg at 08/31/22 2045  •  mupirocin (BACTROBAN) 2 % nasal ointment 1 application, 1 application, Nasal, Q12H, Obey Santiago MD, 1 application at 09/01/22 0613  •  ondansetron (ZOFRAN) tablet 4 mg, 4 mg, Oral, Q6H PRN **OR** ondansetron (ZOFRAN) injection 4 mg, 4 mg, Intravenous, Q6H PRN, Obey Santiago MD, 4 mg at 08/31/22 1513  •  oxyCODONE (ROXICODONE) immediate release tablet 5 mg, 5 mg, Oral, Q4H PRN **OR** oxyCODONE (ROXICODONE) immediate release tablet 10 mg, 10 mg, Oral, Q4H PRN, Joan Chavarria MD, 10 mg at 09/01/22 1308  •  pantoprazole (PROTONIX) EC tablet 40 mg, 40 mg, Oral, QAM, Obey Santiago MD, 40 mg at 09/01/22 1145  •  scopolamine patch 1 mg/72 hr, 1 patch, Transdermal, Continuous, Obey Santiago MD, 1 patch at 08/31/22 0902  •  sennosides-docusate (PERICOLACE) 8.6-50 MG per tablet 2 tablet, 2 tablet, Oral, BID PRN, Obey Santiago MD  •  sodium chloride 0.9 % flush 10 mL, 10 mL, Intravenous, PRN, Obey Santiago MD  •  sodium chloride 0.9 % flush 10 mL, 10 mL, Intravenous, Q12H, Obey Santiago MD, 10 mL at 09/01/22 1207  •  sodium chloride 0.9 % flush 20 mL, 20 mL, Intravenous, PRN, Obey Santiago MD  •  sodium zirconium cyclosilicate (LOKELMA) pack 10 g, 10 g, Oral, Daily, Obey Santiago MD, 10 g at 09/01/22 1207  Allergies   Allergen Reactions   • Erythromycin Nausea Only and Other (See Comments)     nausea  upset stomach   • Erythromycin Base Other (See Comments)     nausea         Objective:  Vital Signs   Temp:  [97.7 °F (36.5 °C)-98.6 °F (37 °C)] 98.5 °F (36.9 °C)  Heart Rate:  [71-98] 81  Resp:  [16-18] 16  BP: ()/() 156/77    Results Review:   I reviewed the patient's new clinical  results.  Results from last 7 days   Lab Units 09/01/22  0516 08/30/22  1413   WBC 10*3/mm3  --  5.65   HEMOGLOBIN g/dL 11.5* 11.7*   HEMATOCRIT % 36.1 35.8   PLATELETS 10*3/mm3  --  178     Results from last 7 days   Lab Units 09/01/22  0940 09/01/22  0620   SODIUM mmol/L  --  120*   POTASSIUM mmol/L 3.5 8.2*   CHLORIDE mmol/L  --  89*   CO2 mmol/L  --  18.0*   BUN mg/dL  --  55*   CREATININE mg/dL  --  8.34*   GLUCOSE mg/dL  --  544*   CALCIUM mg/dL  --  8.5*         Results from last 7 days   Lab Units 09/01/22  0940 09/01/22  0620   SODIUM mmol/L  --  120*   POTASSIUM mmol/L 3.5 8.2*   CHLORIDE mmol/L  --  89*   CO2 mmol/L  --  18.0*   BUN mg/dL  --  55*   CREATININE mg/dL  --  8.34*   CALCIUM mg/dL  --  8.5*   GLUCOSE mg/dL  --  544*       IMAGING REVIEW:  XR Shoulder 1 View Right    Result Date: 8/31/2022  XR SHOULDER 1 VW RIGHT-  Date of Exam: 8/31/2022 1:25 PM  Indication: AP grashey view in PACU s/p total shoulder arthroplasty RIGHT.  Comparison: Radiograph 02/03/2022, CT 02/18/2022  Technique: 3 views of the shoulder were obtained.  FINDINGS:  No evidence of fracture. No evidence of dislocation. Postsurgical changes are seen from a right shoulder arthroplasty with lucent glenoid component. Mild degenerative changes present within the chromic clavicular joint. No evidence of lucency surrounding the hardware.      No acute osseous abnormality. Post surgical changes from right shoulder arthroplasty with no process identified.  This report was finalized on 8/31/2022 1:36 PM by Maria Elena Stone MD.      XR Chest 1 View    Result Date: 8/11/2022  Exam/Procedure: XR CHEST 1 VIEW ordered by MEHREEN WYMAN, [id number] CLINICAL INDICATION: h/o rib fractures, eval healing TECHNIQUE: XR CHEST 1 VIEW COMPARISON: 7/12/2022 FINDINGS: Stable position of left-sided tunneled central venous catheter. Stable cardiomegaly. Cervical and thoracic spine fusion hardware. Increased lung volumes, no consolidation, pneumothorax, or pleural  effusion. Stable appearance of right rib fractures, the exam is limited by 1 view. Nonaggressive chondroid lesion involving the right proximal humeral metaphysis.    No acute findings, stable exam. Consider dedicated rib series if further evaluation is indicated. CRITICAL RESULT:   No. COMMUNICATION: Per this written report. By electronically signing this report, I, the attending physician, attest that I have personally reviewed the images/data for the above examination(s) and agree with the final edited report. Dictated by Bassem Calderón MD on 8/11/2022 4:36 PM Signed by Dirk Serrano on 8/11/2022 4:55 PM       Physical Exam:  General: comfortable  Vascular: 2+ radial pulse; digits are pink and well perfused  Operative Shoulder Exam: sling is in place  Neurologic: SLT intact distally well perfused   Dermatologic: surgical dressing is well appearing - clean, dry, and intact; no obvious signs or symptoms of infection or DVT        Primary localized osteoarthrosis of right shoulder region    Contracture of joint of right shoulder region    Biceps tendinitis of right upper extremity    Right shoulder pain    ESRD (end stage renal disease) ([facility])    HTN (hypertension)    DM2 (diabetes mellitus, type 2) ([facility])    Acute postoperative pain        Assessment/Plan:    Disposition: Pending evaluation possible transfer to [facility] versus discharge home will defer to therapy team and medical team  Date of surgery 8/31/2022  Right anatomic total shoulder arthroplasty  POD #: 1  Pain control - well controlled  Drain - N/A  Dressing - clean and can be removed by patient on POD#3 at home.  Okay for RN to replace prior to discharge if dressing is saturated  Sling - in place  Activity - no weight bearing on affected side  PT/OT - sling teaching, non-weight bearing, elbow/wrist/hand exercises only, pendulums for showering on POD #3 at home and for dressing  DVT Prophylaxis - SCDs as inpatient, subcutaneous heparin as an inpatient and okay  to resume her home aspirin and this can serve as her outpatient DVT prophylaxis  Antibiotics - complete postoperative antibiotic course  Diet - continue diet  Anesthesia - single shot block was completed preoperatively    Future Appointments   Date Time Provider Department Center   9/2/2022  7:30 AM DIALYSIS [facility]   9/13/2022  3:30 PM Obey Santiago MD [facility]     I discussed the patient's findings and my recommendations with patient    Please have the schedulers call our office at [phone] if a clinic appointment is needed.    Obey Santiago MD, FAAOS  Orthopedic Surgeon  Fellowship-trained Shoulder and Elbow Surgeon  [facility]  [address] [place] [address]. [address]  [place] [address]      Obey Santiago MD  09/01/22  16:33 EDT

## 2022-09-01 NOTE — PLAN OF CARE
Goal Outcome Evaluation:  Plan of Care Reviewed With: patient        Progress: no change  Outcome Evaluation: PT eval completed. Pt performed bed mobility with supervision, but required Valeria +1 to manage equipment for STS trasfer and ambulation. Pt demonstrated significantly decreased safety awareness despite education of post-op protocol. Pt ambulated 50' with SPC and Valeria + chair follow and required max VC and tactile cues for safety and sequencing. BLE sensation was intact but pt ambulated with decreased proprioception which also contributed to balance deficits. Due to balance deficits and decreased ability to properly use SPC, it is not recommended to continue ambulating with SPC at this time. After using bathroom with PT assist, pt removed peripheral IV line. RN notified. Due to current functional deficits, PT rec IRF upon d/c at this time.

## 2022-09-01 NOTE — PLAN OF CARE
Problem: Adult Inpatient Plan of Care  Goal: Plan of Care Review  Outcome: Ongoing, Progressing  Flowsheets  Taken 9/1/2022 1735 by Luciana Purcell OT  Plan of Care Reviewed With:   patient   significant other  Outcome Evaluation: OT educated pt and SO on R shoulder precautions, ADL retraining to maintain and RUE HEP. She required dependence sling management. Pt limited with 9/10 shoulder pain and IV pain medication administered during session, deferred transfer training. Pt lives with SO who works from 5am-7pm and pt has HD 3x/week. Recommend IPR, pt and family in agreement.  Taken 9/1/2022 1546 by Taniya Garces PT  Progress: no change  Goal: Patient-Specific Goal (Individualized)  Outcome: Ongoing, Progressing  Goal: Absence of Hospital-Acquired Illness or Injury  Outcome: Ongoing, Progressing  Intervention: Identify and Manage Fall Risk  Description: Perform standard risk assessment on admission using a validated tool or comprehensive approach appropriate to the patient; reassess fall risk frequently, with change in status or transfer to another level of care.  Communicate fall injury risk to interprofessional healthcare team.  Determine need for increased observation, equipment and environmental modification, such as low bed, signage and supportive, nonskid footwear.  Adjust safety measures to individual developmental age, stage and identified risk factors.  Reinforce the importance of safety and physical activity with patient and family.  Perform regular intentional rounding to assess need for position change, pain assessment and personal needs, including assistance with toileting.  Flowsheets  Taken 9/1/2022 1623 by Beto Robbins, PCT  Safety Promotion/Fall Prevention:   activity supervised   assistive device/personal items within reach   clutter free environment maintained   fall prevention program maintained   gait belt   lighting adjusted   mobility aid in reach   nonskid shoes/slippers when out  of bed   room organization consistent   safety round/check completed  Taken 9/1/2022 1602 by Gabi Licona RN  Safety Promotion/Fall Prevention:   activity supervised   assistive device/personal items within reach   clutter free environment maintained   nonskid shoes/slippers when out of bed   room organization consistent   safety round/check completed   toileting scheduled   fall prevention program maintained  Taken 9/1/2022 1455 by Gabi Licona RN  Safety Promotion/Fall Prevention:   activity supervised   assistive device/personal items within reach   clutter free environment maintained   nonskid shoes/slippers when out of bed   room organization consistent   safety round/check completed   toileting scheduled   fall prevention program maintained  Taken 9/1/2022 1207 by Gabi Licona RN  Safety Promotion/Fall Prevention:   activity supervised   assistive device/personal items within reach   clutter free environment maintained   nonskid shoes/slippers when out of bed   room organization consistent   safety round/check completed   toileting scheduled  Taken 9/1/2022 1000 by Gabi Licona RN  Safety Promotion/Fall Prevention: patient off unit  Taken 9/1/2022 0800 by Gabi Licona RN  Safety Promotion/Fall Prevention: patient off unit  Intervention: Prevent Skin Injury  Description: Perform a screening for skin injury risk, such as pressure or moisture associated skin damage on admission and at regular intervals throughout hospital stay.  Keep all areas of skin (especially folds) clean and dry.  Maintain adequate skin hydration.  Relieve and redistribute pressure and protect bony prominences; implement measures based on patient-specific risk factors.  Match turning and repositioning schedule to clinical condition.  Encourage weight shift frequently; assist with reposition if unable to complete independently.  Float heels off bed; avoid pressure on the Achilles tendon.  Keep skin free from extended contact  with medical devices.  Encourage functional activity and mobility, as early as tolerated.  Use aids (e.g., slide boards, mechanical lift) during transfer.  Flowsheets  Taken 9/1/2022 1623 by Beto Robbins PCT  Body Position: position changed independently  Taken 9/1/2022 1602 by Gabi Licona RN  Body Position: position changed independently  Taken 9/1/2022 1455 by Gabi Licona RN  Body Position: position changed independently  Taken 9/1/2022 1207 by Gabi Licona RN  Body Position: position changed independently  Skin Protection:   tubing/devices free from skin contact   adhesive use limited  Intervention: Prevent and Manage VTE (Venous Thromboembolism) Risk  Description: Assess for VTE (venous thromboembolism) risk.  Encourage and assist with early ambulation.  Initiate and maintain compression or other therapy, as indicated, based on identified risk in accordance with organizational protocol and provider order.  Encourage both active and passive leg exercises while in bed, if unable to ambulate.  Flowsheets  Taken 9/1/2022 1623 by Beto Robbins PCT  Activity Management:   up in chair   activity adjusted per tolerance  Taken 9/1/2022 1207 by Gabi Licona RN  VTE Prevention/Management: (SQ heparin) other (see comments)  Range of Motion: active ROM (range of motion) encouraged  Intervention: Prevent Infection  Description: Maintain skin and mucous membrane integrity; promote hand, oral and pulmonary hygiene.  Optimize fluid balance, nutrition, sleep and glycemic control to maximize infection resistance.  Identify potential sources of infection early to prevent or mitigate progression of infection (e.g., wound, lines, devices).  Evaluate ongoing need for invasive devices; remove promptly when no longer indicated.  Flowsheets  Taken 9/1/2022 1623 by Beto Robbins PCT  Infection Prevention:   environmental surveillance performed   equipment surfaces disinfected   hand hygiene promoted   personal protective  equipment utilized   rest/sleep promoted   single patient room provided   visitors restricted/screened  Taken 9/1/2022 1602 by Gabi Licona, RN  Infection Prevention: hand hygiene promoted  Taken 9/1/2022 1455 by Gabi Licona RN  Infection Prevention: environmental surveillance performed  Taken 9/1/2022 1207 by Gabi Licona RN  Infection Prevention: environmental surveillance performed  Goal: Optimal Comfort and Wellbeing  Outcome: Ongoing, Progressing  Intervention: Monitor Pain and Promote Comfort  Description: Assess pain level, treatment efficacy and patient response at regular intervals using a consistent pain scale.  Consider the presence and impact of preexisting chronic pain.  Encourage patient and caregiver involvement in pain assessment, interventions and safety measures.  Flowsheets (Taken 8/31/2022 1341 by Sonali Clemente RN)  Pain Management Interventions: see MAR  Intervention: Provide Person-Centered Care  Description: Use a family-focused approach to care.  Develop trust and rapport by proactively providing information, encouraging questions, addressing concerns and offering reassurance.  Acknowledge emotional response to hospitalization.  Recognize and utilize personal coping strategies.  Honor spiritual and cultural preferences.  Flowsheets (Taken 9/1/2022 1207)  Trust Relationship/Rapport: care explained  Goal: Readiness for Transition of Care  Outcome: Ongoing, Progressing  Intervention: Mutually Develop Transition Plan  Description: Identify available resources for support (e.g., family, friends, community).  Identify and address barriers to ongoing treatment and home management (e.g., environmental, financial).  Provide opportunities to practice self-management skills.  Assess and monitor emotional readiness for transition.  Establish or reconnect linkage with outpatient providers or community-based services.  Flowsheets (Taken 8/31/2022 1539 by Jo Chambers RN)  Discharge  Facility/Level of Care Needs: nursing facility, skilled  Equipment Currently Used at Home:   rollator   colostomy/ostomy supplies   glucometer   shower chair   power chair,(recliner lift)   commode  Anticipated Changes Related to Illness: inability to care for self  Transportation Anticipated: family or friend will provide  Transportation Concerns: none  Current Discharge Risk:   chronically ill   dependent with mobility/activities of daily living   lack of support system/caregiver  Concerns to be Addressed: discharge planning  Readmission Within the Last 30 Days: no previous admission in last 30 days  Patient/Family Anticipated Services at Transition:      rehabilitation services  Patient/Family Anticipates Transition to: inpatient rehabilitation facility  Goal: Plan of Care Review  Outcome: Ongoing, Progressing  Flowsheets  Taken 9/1/2022 1735 by Luciana Purcell OT  Plan of Care Reviewed With:   patient   significant other  Outcome Evaluation: OT educated pt and SO on R shoulder precautions, ADL retraining to maintain and RUE HEP. She required dependence sling management. Pt limited with 9/10 shoulder pain and IV pain medication administered during session, deferred transfer training. Pt lives with SO who works from 5am-7pm and pt has HD 3x/week. Recommend IPR, pt and family in agreement.  Taken 9/1/2022 1546 by Taniya Garces PT  Progress: no change  Goal: Patient-Specific Goal (Individualized)  Outcome: Ongoing, Progressing  Goal: Absence of Hospital-Acquired Illness or Injury  Outcome: Ongoing, Progressing  Intervention: Identify and Manage Fall Risk  Description: Perform standard risk assessment on admission using a validated tool or comprehensive approach appropriate to the patient; reassess fall risk frequently, with change in status or transfer to another level of care.  Communicate fall injury risk to interprofessional healthcare team.  Determine need for increased observation,  equipment and environmental modification, such as low bed, signage and supportive, nonskid footwear.  Adjust safety measures to individual developmental age, stage and identified risk factors.  Reinforce the importance of safety and physical activity with patient and family.  Perform regular intentional rounding to assess need for position change, pain assessment and personal needs, including assistance with toileting.  Flowsheets  Taken 9/1/2022 1623 by Beto Robbins PCT  Safety Promotion/Fall Prevention:   activity supervised   assistive device/personal items within reach   clutter free environment maintained   fall prevention program maintained   gait belt   lighting adjusted   mobility aid in reach   nonskid shoes/slippers when out of bed   room organization consistent   safety round/check completed  Taken 9/1/2022 1602 by Gabi Licona, RN  Safety Promotion/Fall Prevention:   activity supervised   assistive device/personal items within reach   clutter free environment maintained   nonskid shoes/slippers when out of bed   room organization consistent   safety round/check completed   toileting scheduled   fall prevention program maintained  Taken 9/1/2022 1455 by Gabi Licona, RN  Safety Promotion/Fall Prevention:   activity supervised   assistive device/personal items within reach   clutter free environment maintained   nonskid shoes/slippers when out of bed   room organization consistent   safety round/check completed   toileting scheduled   fall prevention program maintained  Taken 9/1/2022 1207 by Gabi Licona, RN  Safety Promotion/Fall Prevention:   activity supervised   assistive device/personal items within reach   clutter free environment maintained   nonskid shoes/slippers when out of bed   room organization consistent   safety round/check completed   toileting scheduled  Taken 9/1/2022 1000 by Gabi Licona, RN  Safety Promotion/Fall Prevention: patient off unit  Taken 9/1/2022 0800 by Livan  NIGRID Ashley  Safety Promotion/Fall Prevention: patient off unit  Intervention: Prevent Skin Injury  Description: Perform a screening for skin injury risk, such as pressure or moisture associated skin damage on admission and at regular intervals throughout hospital stay.  Keep all areas of skin (especially folds) clean and dry.  Maintain adequate skin hydration.  Relieve and redistribute pressure and protect bony prominences; implement measures based on patient-specific risk factors.  Match turning and repositioning schedule to clinical condition.  Encourage weight shift frequently; assist with reposition if unable to complete independently.  Float heels off bed; avoid pressure on the Achilles tendon.  Keep skin free from extended contact with medical devices.  Encourage functional activity and mobility, as early as tolerated.  Use aids (e.g., slide boards, mechanical lift) during transfer.  Flowsheets  Taken 9/1/2022 1623 by Beto Robbins PCT  Body Position: position changed independently  Taken 9/1/2022 1602 by Gabi Licona RN  Body Position: position changed independently  Taken 9/1/2022 1455 by Gabi Licona RN  Body Position: position changed independently  Taken 9/1/2022 1207 by Gabi Licona RN  Body Position: position changed independently  Skin Protection:   tubing/devices free from skin contact   adhesive use limited  Intervention: Prevent and Manage VTE (Venous Thromboembolism) Risk  Description: Assess for VTE (venous thromboembolism) risk.  Encourage and assist with early ambulation.  Initiate and maintain compression or other therapy, as indicated, based on identified risk in accordance with organizational protocol and provider order.  Encourage both active and passive leg exercises while in bed, if unable to ambulate.  Flowsheets  Taken 9/1/2022 1623 by Beto Robbins PCT  Activity Management:   up in chair   activity adjusted per tolerance  Taken 9/1/2022 1207 by Gabi Licona RN  VTE  Prevention/Management: (SQ heparin) other (see comments)  Range of Motion: active ROM (range of motion) encouraged  Intervention: Prevent Infection  Description: Maintain skin and mucous membrane integrity; promote hand, oral and pulmonary hygiene.  Optimize fluid balance, nutrition, sleep and glycemic control to maximize infection resistance.  Identify potential sources of infection early to prevent or mitigate progression of infection (e.g., wound, lines, devices).  Evaluate ongoing need for invasive devices; remove promptly when no longer indicated.  Flowsheets  Taken 9/1/2022 1623 by Beto Robbins PCT  Infection Prevention:   environmental surveillance performed   equipment surfaces disinfected   hand hygiene promoted   personal protective equipment utilized   rest/sleep promoted   single patient room provided   visitors restricted/screened  Taken 9/1/2022 1602 by Gabi Licona, RN  Infection Prevention: hand hygiene promoted  Taken 9/1/2022 1455 by Gabi Licona, RN  Infection Prevention: environmental surveillance performed  Taken 9/1/2022 1207 by Gabi Licona, RN  Infection Prevention: environmental surveillance performed  Goal: Optimal Comfort and Wellbeing  Outcome: Ongoing, Progressing  Intervention: Monitor Pain and Promote Comfort  Description: Assess pain level, treatment efficacy and patient response at regular intervals using a consistent pain scale.  Consider the presence and impact of preexisting chronic pain.  Encourage patient and caregiver involvement in pain assessment, interventions and safety measures.  Flowsheets (Taken 8/31/2022 1341 by Sonali Clemente, RN)  Pain Management Interventions: see MAR  Intervention: Provide Person-Centered Care  Description: Use a family-focused approach to care.  Develop trust and rapport by proactively providing information, encouraging questions, addressing concerns and offering reassurance.  Acknowledge emotional response to hospitalization.  Recognize  and utilize personal coping strategies.  Honor spiritual and cultural preferences.  Flowsheets (Taken 9/1/2022 1207)  Trust Relationship/Rapport: care explained  Goal: Readiness for Transition of Care  Outcome: Ongoing, Progressing  Intervention: Mutually Develop Transition Plan  Description: Identify available resources for support (e.g., family, friends, community).  Identify and address barriers to ongoing treatment and home management (e.g., environmental, financial).  Provide opportunities to practice self-management skills.  Assess and monitor emotional readiness for transition.  Establish or reconnect linkage with outpatient providers or community-based services.  Flowsheets (Taken 8/31/2022 1539 by Jo Chambers RN)  Discharge Facility/Level of Care Needs: nursing facility, skilled  Equipment Currently Used at Home:   rollator   colostomy/ostomy supplies   glucometer   shower chair   power chair,(recliner lift)   commode  Anticipated Changes Related to Illness: inability to care for self  Transportation Anticipated: family or friend will provide  Transportation Concerns: none  Current Discharge Risk:   chronically ill   dependent with mobility/activities of daily living   lack of support system/caregiver  Concerns to be Addressed: discharge planning  Readmission Within the Last 30 Days: no previous admission in last 30 days  Patient/Family Anticipated Services at Transition:      rehabilitation services  Patient/Family Anticipates Transition to: inpatient rehabilitation facility     Problem: Fall Injury Risk  Goal: Absence of Fall and Fall-Related Injury  Outcome: Ongoing, Progressing  Intervention: Identify and Manage Contributors  Description: Develop a fall prevention plan with the patient and caregiver/family.  Provide reorientation, appropriate sensory stimulation and routines with changes in mental status to decrease risk of fall.  Promote use of personal vision and auditory  aids.  Assess assistance level required for safe and effective self-care; provide support as needed, such as toileting, mobilization. For age 65 and older, implement timed toileting with assistance.  Encourage physical activity, such as performance of mobility and self-care at highest level of patient ability, multicomponent exercise program and provision of appropriate assistive devices.  If fall occurs, assess the severity of injury; implement fall injury protocol. Determine the cause and revise fall injury prevention plan.  Regularly review medication contribution to fall risk; adjust medication administration times to minimize risk of falling.  Consider risk related to polypharmacy and age.  Balance adequate pain management with potential for oversedation.  Flowsheets  Taken 9/1/2022 1602  Medication Review/Management: medications reviewed  Taken 9/1/2022 1455  Medication Review/Management: medications reviewed  Taken 9/1/2022 1207  Medication Review/Management: medications reviewed  Intervention: Promote Injury-Free Environment  Description: Provide a safe, barrier-free environment that encourages independent activity.  Keep care area uncluttered and well-lighted.  Determine need for increased observation or monitoring.  Avoid use of devices that minimize mobility, such as restraints or indwelling urinary catheter.  Flowsheets  Taken 9/1/2022 1623 by Beto Robbins, PCT  Safety Promotion/Fall Prevention:   activity supervised   assistive device/personal items within reach   clutter free environment maintained   fall prevention program maintained   gait belt   lighting adjusted   mobility aid in reach   nonskid shoes/slippers when out of bed   room organization consistent   safety round/check completed  Taken 9/1/2022 1602 by Gabi Licona, RN  Safety Promotion/Fall Prevention:   activity supervised   assistive device/personal items within reach   clutter free environment maintained   nonskid shoes/slippers when  out of bed   room organization consistent   safety round/check completed   toileting scheduled   fall prevention program maintained  Taken 9/1/2022 1455 by Gabi Licona, RN  Safety Promotion/Fall Prevention:   activity supervised   assistive device/personal items within reach   clutter free environment maintained   nonskid shoes/slippers when out of bed   room organization consistent   safety round/check completed   toileting scheduled   fall prevention program maintained  Taken 9/1/2022 1207 by Gabi Licona, RN  Safety Promotion/Fall Prevention:   activity supervised   assistive device/personal items within reach   clutter free environment maintained   nonskid shoes/slippers when out of bed   room organization consistent   safety round/check completed   toileting scheduled  Taken 9/1/2022 1000 by Gabi Licona RN  Safety Promotion/Fall Prevention: patient off unit  Taken 9/1/2022 0800 by Gabi Licona, RN  Safety Promotion/Fall Prevention: patient off unit   Goal Outcome Evaluation:        Pt had dialysis at beginning of shift. When pt returned she was hypertensive. Daily meds given which brought BP down. Hyperglycemia treated per MAR.  Pain treated by PRN and scheduled medications. Sling in place, pt up in chair.

## 2022-09-01 NOTE — THERAPY EVALUATION
Patient Name: Yashira Gudino  : 1960    MRN: [id number]                              Today's Date: 2022       Admit Date: 2022    Visit Dx:     ICD-10-CM ICD-9-CM   1. ESRD (end stage renal disease) (Formerly [facility])  N18.6 585.6   2. Primary localized osteoarthrosis of right shoulder region  M19.011 715.11   3. Contracture of joint of right shoulder region  M24.511 718.41   4. Biceps tendinitis of right upper extremity  M75.21 726.12   5. Right shoulder pain, unspecified chronicity  M25.511 719.41     Patient Active Problem List   Diagnosis   • Primary localized osteoarthrosis of right shoulder region   • Contracture of joint of right shoulder region   • Biceps tendinitis of right upper extremity   • Right shoulder pain   • ESRD (end stage renal disease) ([facility])   • HTN (hypertension)   • DM2 (diabetes mellitus, type 2) ([facility])   • Acute postoperative pain     Past Medical History:   Diagnosis Date   • Arthritis    • Charcot-Bertha-Tooth disease    • Chronic kidney disease (CKD), stage IV (severe) ([facility]ly [facility])    • Depression    • Diabetes ([facility])    • Dialysis patient (Formerly [facility])    • Fibromyalgia    • History of MRSA infection 2020    left finger, left foot   • Hypertension    • Ileostomy in place ([facility])    • Osteoporosis      Past Surgical History:   Procedure Laterality Date   • ANKLE SURGERY     •  SECTION     • COLON RESECTION     • COLONOSCOPY     • COLOSTOMY     • FOOT SURGERY Left     excise Small Toe   • HAND SURGERY Left     Excise IF   • NECK SURGERY     • TONSILLECTOMY     • TOTAL SHOULDER ARTHROPLASTY Right 2022    Procedure: TOTAL SHOULDER ARTHROPLASTY RIGHT;  Surgeon: Obey Santiago MD;  Location: [facility];  Service: Orthopedics;  Laterality: Right;   • TUBAL ABDOMINAL LIGATION        General Information     Row Name 22 1537          Physical Therapy Time and Intention    Document Type evaluation  -ES     Mode of Treatment physical therapy  -ES     Row Name 22 1537           General Information    Patient Profile Reviewed yes  -ES     Prior Level of Function independent:;all household mobility;gait;transfer;ADL's;bed mobility;using stairs  -ES     Existing Precautions/Restrictions non-weight bearing;right;shoulder;other (see comments)  R shoulder ABduction brace  -ES     Barriers to Rehab medically complex;previous functional deficit  -ES     Row Name 09/01/22 1537          Living Environment    People in Home significant other  -ES     Row Name 09/01/22 1537          Home Main Entrance    Number of Stairs, Main Entrance two  -ES     Stair Railings, Main Entrance railing on left side (ascending)  -ES     Row Name 09/01/22 1537          Stairs Within Home, Primary    Number of Stairs, Within Home, Primary none  -ES     Row Name 09/01/22 1537          Cognition    Orientation Status (Cognition) oriented x 3  -ES     Row Name 09/01/22 1537          Safety Issues, Functional Mobility    Safety Issues Affecting Function (Mobility) ability to follow commands;awareness of need for assistance;insight into deficits/self-awareness;judgment;positioning of assistive device;problem-solving;safety precaution awareness;safety precautions follow-through/compliance;sequencing abilities  -ES     Impairments Affecting Function (Mobility) balance;endurance/activity tolerance;coordination;pain;strength  -ES           User Key  (r) = Recorded By, (t) = Taken By, (c) = Cosigned By    Initials Name Provider Type    ES Taniya Garces PT Physical Therapist               Mobility     Row Name 09/01/22 1539          Bed Mobility    Bed Mobility supine-sit  -ES     Supine-Sit Wirt (Bed Mobility) contact guard;verbal cues;nonverbal cues (demo/gesture)  -ES     Assistive Device (Bed Mobility) bed rails;head of bed elevated  -ES     Row Name 09/01/22 1539          Sit-Stand Transfer    Sit-Stand Wirt (Transfers) minimum assist (75% patient effort);1 person to manage equipment  -ES     Assistive  Device (Sit-Stand Transfers) cane, straight  -ES     Comment, (Sit-Stand Transfer) Pt required max VC for proper sequencing and safety.  -ES     Row Name 09/01/22 1539          Gait/Stairs (Locomotion)    [place]ham Level (Gait) minimum assist (75% patient effort);1 person to manage equipment;nonverbal cues (demo/gesture);verbal cues  + chair follow  -ES     Assistive Device (Gait) cane, straight  -ES     Distance in Feet (Gait) 50  -ES     Deviations/Abnormal Patterns (Gait) bilateral deviations;douglas decreased;gait speed decreased;base of support, narrow;stride length decreased  -ES     Bilateral Gait Deviations forward flexed posture  -ES     Durham Level (Stairs) not tested  -ES     Comment, (Gait/Stairs) Pt educated on proper use of SPC while ambulating. Due to decreased safety awareness and ability to properly use SPC, it is not recommended to continue ambulating with SPC at this time. Required max VC and tactile cues for sequencing and safety. Pt ambulates with rollator at baseline, and was educated extensively on why this is not an appropriate AD at this time.  -ES     Row Name 09/01/22 1539          Mobility    Extremity Weight-bearing Status right upper extremity  -ES     Right Upper Extremity (Weight-bearing Status) non weight-bearing (NWB)  -ES           User Key  (r) = Recorded By, (t) = Taken By, (c) = Cosigned By    Initials Name Provider Type    Taniya Aldrich PT Physical Therapist               Obj/Interventions     Row Name 09/01/22 1542          Range of Motion Comprehensive    General Range of Motion lower extremity range of motion deficits identified  -ES     Comment, General Range of Motion B dorsiflexion ROM limited. Remote Hx of L ankle fusion  -ES     Row Name 09/01/22 1542          Strength Comprehensive (MMT)    General Manual Muscle Testing (MMT) Assessment lower extremity strength deficits identified  -ES     Comment, General Manual Muscle Testing (MMT) Assessment BLE  strength grossly 4/5  -ES     Row Name 09/01/22 1542          Balance    Balance Assessment sitting static balance;sitting dynamic balance;sit to stand dynamic balance;standing dynamic balance;standing static balance  -ES     Static Sitting Balance supervision  -ES     Dynamic Sitting Balance contact guard;verbal cues;non-verbal cues (demo/gesture)  -ES     Position, Sitting Balance unsupported;sitting edge of bed  -ES     Sit to Stand Dynamic Balance minimal assist;1 person to manage equipment  -ES     Static Standing Balance non-verbal cues (demo/gesture);verbal cues;1 person to manage equipment;minimal assist  -ES     Dynamic Standing Balance minimal assist;1 person to manage equipment;verbal cues;non-verbal cues (demo/gesture)  -ES     Position/Device Used, Standing Balance supported;cane, straight  -ES     Balance Interventions sitting;standing;sit to stand;supported;static;dynamic;dynamic reaching  -ES     Comment, Balance Pt moderately unsteady during transfers and ambulation. Decreased safety awareness as well as decreased coordination.  -ES     Row Name 09/01/22 1542          Sensory Assessment (Somatosensory)    Sensory Assessment (Somatosensory) LE sensation intact;other (see comments)  Sensation intact but demonstrates decreased proprioception while ambulating.  -ES           User Key  (r) = Recorded By, (t) = Taken By, (c) = Cosigned By    Initials Name Provider Type    ES Taniya Garces, PT Physical Therapist               Goals/Plan     Row Name 09/01/22 1555          Transfer Goal 1 (PT)    Activity/Assistive Device (Transfer Goal 1, PT) sit-to-stand/stand-to-sit  -ES     Bergen Level/Cues Needed (Transfer Goal 1, PT) supervision required  -ES     Time Frame (Transfer Goal 1, PT) 2 weeks  -ES     Row Name 09/01/22 2624          Gait Training Goal 1 (PT)    Activity/Assistive Device (Gait Training Goal 1, PT) gait (walking locomotion)  -ES     Bergen Level (Gait Training Goal 1, PT)  contact guard required  -ES     Distance (Gait Training Goal 1, PT) 150'  -ES     Time Frame (Gait Training Goal 1, PT) 2 weeks  -ES     Row Name 09/01/22 1558          Stairs Goal 1 (PT)    Activity/Assistive Device (Stairs Goal 1, PT) stairs, all skills  -ES     Sully Level/Cues Needed (Stairs Goal 1, PT) contact guard required  -ES     Number of Stairs (Stairs Goal 1, PT) 2  -ES     Time Frame (Stairs Goal 1, PT) 2 weeks  -ES     Row Name 09/01/22 1551          Therapy Assessment/Plan (PT)    Planned Therapy Interventions (PT) balance training;bed mobility training;gait training;patient/family education;stair training;strengthening;transfer training  -ES           User Key  (r) = Recorded By, (t) = Taken By, (c) = Cosigned By    Initials Name Provider Type    ES Taniya Garces, PT Physical Therapist               Clinical Impression     Row Name 09/01/22 1577          Pain    Pretreatment Pain Rating 0/10 - no pain  -ES     Posttreatment Pain Rating 2/10  -ES     Pain Location - Side/Orientation Left  -ES     Pain Location generalized  -ES     Pain Location - shoulder  -ES     Pain Intervention(s) Cold applied;Repositioned;Ambulation/increased activity  -ES     Row Name 09/01/22 0569          Plan of Care Review    Plan of Care Reviewed With patient  -ES     Progress no change  -ES     Outcome Evaluation PT eval completed. Pt performed bed mobility with supervision, but required Valeria +1 to manage equipment for STS trasfer and ambulation. Pt demonstrated significantly decreased safety awareness despite education of post-op protocol. Pt ambulated 50' with SPC and Valeria + chair follow and required max VC and tactile cues for safety and sequencing. BLE sensation was intact but pt ambulated with decreased proprioception which also contributed to balance deficits. Due to balance deficits and decreased ability to properly use SPC, it is not recommended to continue ambulating with SPC at this time. After using  bathroom with PT assist, pt removed peripheral IV line. RN notified. Due to current functional deficits, PT rec IRF upon d/c at this time.  -ES     Row Name 09/01/22 1546          Therapy Assessment/Plan (PT)    Rehab Potential (PT) good, to achieve stated therapy goals  -ES     Criteria for Skilled Interventions Met (PT) yes;meets criteria;skilled treatment is necessary  -ES     Therapy Frequency (PT) daily  -ES     Row Name 09/01/22 1546          Vital Signs    Pre Systolic BP Rehab --  VSS  -ES     Pre Patient Position Supine  -ES     Intra Patient Position Standing  -ES     Post Patient Position Sitting  -ES     Row Name 09/01/22 1546          Positioning and Restraints    Pre-Treatment Position in bed  -ES     Post Treatment Position chair  -ES     In Chair notified nsg;reclined;sitting;call light within reach;encouraged to call for assist;exit alarm on;RUE elevated;legs elevated;waffle cushion;with brace  -ES           User Key  (r) = Recorded By, (t) = Taken By, (c) = Cosigned By    Initials Name Provider Type    ES Taniya Garces, PT Physical Therapist               Outcome Measures     Row Name 09/01/22 1602 09/01/22 1207       How much help from another person do you currently need...    Turning from your back to your side while in flat bed without using bedrails? 3  -ES 4  -AM    Moving from lying on back to sitting on the side of a flat bed without bedrails? 3  -ES 4  -AM    Moving to and from a bed to a chair (including a wheelchair)? 2  -ES 3  -AM    Standing up from a chair using your arms (e.g., wheelchair, bedside chair)? 2  -ES 3  -AM    Climbing 3-5 steps with a railing? 2  -ES 2  -AM    To walk in hospital room? 2  -ES 3  -AM    AM-PAC 6 Clicks Score (PT) 14  -ES 19  -AM    Highest level of mobility 4 --> Transferred to chair/commode  -ES 6 --> Walked 10 steps or more  -AM    Row Name 09/01/22 1602          Functional Assessment    Outcome Measure Options AM-PAC 6 Clicks Basic Mobility (PT)   -ES           User Key  (r) = Recorded By, (t) = Taken By, (c) = Cosigned By    Initials Name Provider Type    AM Gabi Licona, RN Registered Nurse    Taniya Aldrich PT Physical Therapist                             Physical Therapy Education                 Title: PT OT SLP Therapies (Done)     Topic: Physical Therapy (Done)     Point: Mobility training (Done)     Learning Progress Summary           Patient Acceptance, E,TB, VU by ES at 9/1/2022 1603                   Point: Home exercise program (Done)     Learning Progress Summary           Patient Acceptance, E,TB, VU by ES at 9/1/2022 1603                   Point: Body mechanics (Done)     Learning Progress Summary           Patient Acceptance, E,TB, VU by ES at 9/1/2022 1603                   Point: Precautions (Done)     Learning Progress Summary           Patient Acceptance, E,TB, VU by EZEQUIEL at 9/1/2022 1603                               User Key     Initials Effective Dates Name Provider Type Discipline    EZEQUIEL 08/11/22 -  Taniya Garces PT Physical Therapist PT              PT Recommendation and Plan  Planned Therapy Interventions (PT): balance training, bed mobility training, gait training, patient/family education, stair training, strengthening, transfer training  Plan of Care Reviewed With: patient  Progress: no change  Outcome Evaluation: PT eval completed. Pt performed bed mobility with supervision, but required Valeria +1 to manage equipment for STS trasfer and ambulation. Pt demonstrated significantly decreased safety awareness despite education of post-op protocol. Pt ambulated 50' with SPC and Valeria + chair follow and required max VC and tactile cues for safety and sequencing. BLE sensation was intact but pt ambulated with decreased proprioception which also contributed to balance deficits. Due to balance deficits and decreased ability to properly use SPC, it is not recommended to continue ambulating with SPC at this time. After using bathroom  with PT assist, pt removed peripheral IV line. RN notified. Due to current functional deficits, PT rec IRF upon d/c at this time.     Time Calculation:    PT Charges     Row Name 09/01/22 1605             Time Calculation    Start Time 1429  -ES      PT Received On 09/01/22  -ES      PT Goal Re-Cert Due Date 09/11/22  -ES              Time Calculation- PT    Total Timed Code Minutes- PT 10 minute(s)  -ES              Timed Charges    12646 - Gait Training Minutes  10  -ES              Untimed Charges    PT Eval/Re-eval Minutes 36  -ES              Total Minutes    Timed Charges Total Minutes 10  -ES      Untimed Charges Total Minutes 36  -ES       Total Minutes 46  -ES            User Key  (r) = Recorded By, (t) = Taken By, (c) = Cosigned By    Initials Name Provider Type    ES Taniya Garces PT Physical Therapist              Therapy Charges for Today     Code Description Service Date Service Provider Modifiers Qty    71684897428 HC PT EVAL MOD COMPLEXITY 3 9/1/2022 Taniya Garces, PT GP 1    47780244386 HC GAIT TRAINING EA 15 MIN 9/1/2022 Taniya Garces PT GP 1          PT G-Codes  Outcome Measure Options: AM-PAC 6 Clicks Basic Mobility (PT)  AM-PAC 6 Clicks Score (PT): 14    Taniya Garces PT  9/1/2022

## 2022-09-02 ENCOUNTER — APPOINTMENT (OUTPATIENT)
Dept: NEPHROLOGY | Facility: HOSPITAL | Age: 62
End: 2022-09-02

## 2022-09-02 LAB
ALBUMIN SERPL-MCNC: 4 G/DL (ref 3.5–5.2)
ANION GAP SERPL CALCULATED.3IONS-SCNC: 14 MMOL/L (ref 5–15)
BUN SERPL-MCNC: 26 MG/DL (ref 8–23)
BUN/CREAT SERPL: 4.3 (ref 7–25)
CALCIUM SPEC-SCNC: 8.7 MG/DL (ref 8.6–10.5)
CHLORIDE SERPL-SCNC: 96 MMOL/L (ref 98–107)
CO2 SERPL-SCNC: 22 MMOL/L (ref 22–29)
CREAT SERPL-MCNC: 5.98 MG/DL (ref 0.57–1)
EGFRCR SERPLBLD CKD-EPI 2021: 7.6 ML/MIN/1.73
GLUCOSE BLDC GLUCOMTR-MCNC: 120 MG/DL (ref 70–130)
GLUCOSE BLDC GLUCOMTR-MCNC: 152 MG/DL (ref 70–130)
GLUCOSE BLDC GLUCOMTR-MCNC: 156 MG/DL (ref 70–130)
GLUCOSE BLDC GLUCOMTR-MCNC: 223 MG/DL (ref 70–130)
GLUCOSE BLDC GLUCOMTR-MCNC: 275 MG/DL (ref 70–130)
GLUCOSE BLDC GLUCOMTR-MCNC: 30 MG/DL (ref 70–130)
GLUCOSE BLDC GLUCOMTR-MCNC: 35 MG/DL (ref 70–130)
GLUCOSE SERPL-MCNC: 158 MG/DL (ref 65–99)
PHOSPHATE SERPL-MCNC: 5.9 MG/DL (ref 2.5–4.5)
POTASSIUM SERPL-SCNC: 4.3 MMOL/L (ref 3.5–5.2)
SODIUM SERPL-SCNC: 132 MMOL/L (ref 136–145)

## 2022-09-02 PROCEDURE — 97535 SELF CARE MNGMENT TRAINING: CPT

## 2022-09-02 PROCEDURE — 63710000001 INSULIN DETEMIR PER 5 UNITS: Performed by: NURSE PRACTITIONER

## 2022-09-02 PROCEDURE — 63710000001 INSULIN LISPRO (HUMAN) PER 5 UNITS: Performed by: INTERNAL MEDICINE

## 2022-09-02 PROCEDURE — 25010000002 HYDROMORPHONE PER 4 MG: Performed by: ORTHOPAEDIC SURGERY

## 2022-09-02 PROCEDURE — A9270 NON-COVERED ITEM OR SERVICE: HCPCS | Performed by: ORTHOPAEDIC SURGERY

## 2022-09-02 PROCEDURE — 97116 GAIT TRAINING THERAPY: CPT

## 2022-09-02 PROCEDURE — A9270 NON-COVERED ITEM OR SERVICE: HCPCS | Performed by: INTERNAL MEDICINE

## 2022-09-02 PROCEDURE — 63710000001 MUPIROCIN 2 % OINTMENT 1 G TUBE: Performed by: ORTHOPAEDIC SURGERY

## 2022-09-02 PROCEDURE — 25010000002 HEPARIN (PORCINE) PER 1000 UNITS: Performed by: ORTHOPAEDIC SURGERY

## 2022-09-02 PROCEDURE — 63710000001 INSULIN LISPRO (HUMAN) PER 5 UNITS: Performed by: ORTHOPAEDIC SURGERY

## 2022-09-02 PROCEDURE — 80069 RENAL FUNCTION PANEL: CPT | Performed by: INTERNAL MEDICINE

## 2022-09-02 PROCEDURE — 82962 GLUCOSE BLOOD TEST: CPT

## 2022-09-02 PROCEDURE — G0108 DIAB MANAGE TRN  PER INDIV: HCPCS

## 2022-09-02 PROCEDURE — 97110 THERAPEUTIC EXERCISES: CPT

## 2022-09-02 PROCEDURE — A9270 NON-COVERED ITEM OR SERVICE: HCPCS | Performed by: NURSE PRACTITIONER

## 2022-09-02 PROCEDURE — 99024 POSTOP FOLLOW-UP VISIT: CPT | Performed by: ORTHOPAEDIC SURGERY

## 2022-09-02 RX ADMIN — INSULIN DETEMIR 20 UNITS: 100 INJECTION, SOLUTION SUBCUTANEOUS at 08:05

## 2022-09-02 RX ADMIN — MUPIROCIN 1 APPLICATION: 20 OINTMENT TOPICAL at 05:22

## 2022-09-02 RX ADMIN — Medication 10 MG: at 21:22

## 2022-09-02 RX ADMIN — CARVEDILOL 25 MG: 12.5 TABLET, FILM COATED ORAL at 17:13

## 2022-09-02 RX ADMIN — OXYCODONE 10 MG: 5 TABLET ORAL at 13:45

## 2022-09-02 RX ADMIN — DEXTROSE MONOHYDRATE 25 G: 25 INJECTION, SOLUTION INTRAVENOUS at 05:21

## 2022-09-02 RX ADMIN — HYDROMORPHONE HYDROCHLORIDE 0.5 MG: 1 INJECTION, SOLUTION INTRAMUSCULAR; INTRAVENOUS; SUBCUTANEOUS at 21:23

## 2022-09-02 RX ADMIN — INSULIN LISPRO 2 UNITS: 100 INJECTION, SOLUTION INTRAVENOUS; SUBCUTANEOUS at 14:10

## 2022-09-02 RX ADMIN — INSULIN LISPRO 4 UNITS: 100 INJECTION, SOLUTION INTRAVENOUS; SUBCUTANEOUS at 17:14

## 2022-09-02 RX ADMIN — CYCLOBENZAPRINE 10 MG: 10 TABLET, FILM COATED ORAL at 16:37

## 2022-09-02 RX ADMIN — INSULIN LISPRO 3 UNITS: 100 INJECTION, SOLUTION INTRAVENOUS; SUBCUTANEOUS at 07:59

## 2022-09-02 RX ADMIN — HEPARIN SODIUM 5000 UNITS: 5000 INJECTION INTRAVENOUS; SUBCUTANEOUS at 05:22

## 2022-09-02 RX ADMIN — HEPARIN SODIUM 5000 UNITS: 5000 INJECTION INTRAVENOUS; SUBCUTANEOUS at 13:46

## 2022-09-02 RX ADMIN — INSULIN LISPRO 3 UNITS: 100 INJECTION, SOLUTION INTRAVENOUS; SUBCUTANEOUS at 14:11

## 2022-09-02 RX ADMIN — INSULIN LISPRO 3 UNITS: 100 INJECTION, SOLUTION INTRAVENOUS; SUBCUTANEOUS at 17:14

## 2022-09-02 RX ADMIN — MUPIROCIN 1 APPLICATION: 20 OINTMENT TOPICAL at 17:15

## 2022-09-02 RX ADMIN — CYCLOBENZAPRINE 10 MG: 10 TABLET, FILM COATED ORAL at 21:22

## 2022-09-02 RX ADMIN — GABAPENTIN 600 MG: 300 CAPSULE ORAL at 21:22

## 2022-09-02 RX ADMIN — HYDROMORPHONE HYDROCHLORIDE 0.5 MG: 1 INJECTION, SOLUTION INTRAMUSCULAR; INTRAVENOUS; SUBCUTANEOUS at 06:13

## 2022-09-02 RX ADMIN — HYDROMORPHONE HYDROCHLORIDE 0.5 MG: 1 INJECTION, SOLUTION INTRAMUSCULAR; INTRAVENOUS; SUBCUTANEOUS at 17:14

## 2022-09-02 RX ADMIN — INSULIN LISPRO 6 UNITS: 100 INJECTION, SOLUTION INTRAVENOUS; SUBCUTANEOUS at 07:57

## 2022-09-02 RX ADMIN — INSULIN DETEMIR 10 UNITS: 100 INJECTION, SOLUTION SUBCUTANEOUS at 21:23

## 2022-09-02 RX ADMIN — HEPARIN SODIUM 1000 UNITS: 1000 INJECTION INTRAVENOUS; SUBCUTANEOUS at 12:39

## 2022-09-02 RX ADMIN — HEPARIN SODIUM 5000 UNITS: 5000 INJECTION INTRAVENOUS; SUBCUTANEOUS at 21:23

## 2022-09-02 RX ADMIN — ACETAMINOPHEN 1000 MG: 500 TABLET, FILM COATED ORAL at 16:37

## 2022-09-02 NOTE — PLAN OF CARE
Goal Outcome Evaluation:  Plan of Care Reviewed With: patient        Progress: improving  Outcome Evaluation: Pt demo'd improved transfer and gait tolerance today. Pt continues to be unsteady on feet and requires LUE support and mod VC for safety while ambulating. Pain managed well during session and not a limiting factory. OT present at end of session. Continue to rec IPR at this time.

## 2022-09-02 NOTE — PROGRESS NOTES
Referring Provider: Joan Chavarria MD     Orthopedic Shoulder Surgery Note - Obey Santiago MD    Chief Complaint: Status post right total shoulder arthroplasty.  Date of surgery 8/31/2022      Subjective:  Patient is headed to dialysis this morning.  She is doing well    She is awaiting transfer to [facility] on Monday 5 5 by report from .  She will continue with hemodialysis      PHYSICAL THERAPY PROGRESS  Outcome Evaluation: PT eval completed. Pt performed bed mobility with supervision, but required Valeria +1 to manage equipment for STS trasfer and ambulation. Pt demonstrated significantly decreased safety awareness despite education of post-op protocol. Pt ambulated 50' with SPC and Valeria + chair follow and required max VC and tactile cues for safety and sequencing. BLE sensation was intact but pt ambulated with decreased proprioception which also contributed to balance deficits. Due to balance deficits and decreased ability to properly use SPC, it is not recommended to continue ambulating with SPC at this time. After using bathroom with PT assist, pt removed peripheral IV line. RN notified. Due to current functional deficits, PT rec IRF upon d/c at this time. (09/01/22 1546)     Medications/Allergies:    Current Facility-Administered Medications:   •  acetaminophen (TYLENOL) tablet 1,000 mg, 1,000 mg, Oral, Q8H, Luciana Harvey APRN, 1,000 mg at 09/01/22 1700  •  amLODIPine (NORVASC) tablet 10 mg, 10 mg, Oral, Nightly, Obey Santiago MD, 10 mg at 09/01/22 2108  •  bumetanide (BUMEX) tablet 4 mg, 4 mg, Oral, BID, Obey Santiago MD, 4 mg at 09/01/22 2107  •  carvedilol (COREG) tablet 25 mg, 25 mg, Oral, BID With Meals, Obey Santiago MD, 25 mg at 09/01/22 1700  •  cetirizine (zyrTEC) tablet 5 mg, 5 mg, Oral, Daily, Obey Santiago MD, 5 mg at 09/01/22 1145  •  citalopram (CeleXA) tablet 40 mg, 40 mg, Oral, Daily, Obey Santiago MD, 40 mg at 09/01/22 1145  •   cyclobenzaprine (FLEXERIL) tablet 10 mg, 10 mg, Oral, TID, Obey Santiago MD, 10 mg at 09/01/22 2108  •  dextrose (D50W) (25 g/50 mL) IV injection 25 g, 25 g, Intravenous, Q15 Min PRN, Obey Santiago MD, 25 g at 09/02/22 0521  •  dextrose (GLUTOSE) oral gel 15 g, 15 g, Oral, Q15 Min PRN, Obey Santiago MD  •  diphenhydrAMINE (BENADRYL) capsule 25 mg, 25 mg, Oral, Q6H PRN **OR** diphenhydrAMINE (BENADRYL) injection 25 mg, 25 mg, Intravenous, Q6H PRN, Obey Santiago MD  •  gabapentin (NEURONTIN) capsule 600 mg, 600 mg, Oral, Q12H, Obey Santiago MD, 600 mg at 09/01/22 2109  •  glucagon (human recombinant) (GLUCAGEN DIAGNOSTIC) injection 1 mg, 1 mg, Intramuscular, Q15 Min PRN, Obey Santiago MD  •  heparin (porcine) 5000 UNIT/ML injection 5,000 Units, 5,000 Units, Subcutaneous, Q8H, Obey Santiago MD, 5,000 Units at 09/02/22 0522  •  heparin (porcine) injection 1,000 Units, 1,000 Units, Intracatheter, PRN, Obey Santiago MD, 1,000 Units at 09/01/22 1109  •  heparin injection 300 Units, 3 mL, Intravenous, Daily PRN, Obey Santiago MD  •  HYDROmorphone (DILAUDID) injection 0.5 mg, 0.5 mg, Intravenous, Q2H PRN, 0.5 mg at 09/02/22 0613 **AND** naloxone (NARCAN) injection 0.1 mg, 0.1 mg, Intravenous, Q5 Min PRN, Obey Santiago MD  •  insulin detemir (LEVEMIR) injection 10 Units, 10 Units, Subcutaneous, Nightly, Luciana Harvey APRN, 10 Units at 09/01/22 2108  •  insulin detemir (LEVEMIR) injection 20 Units, 20 Units, Subcutaneous, QAM, Luciana Harvey APRN, 20 Units at 09/02/22 0805  •  Insulin Lispro (humaLOG) injection 0-9 Units, 0-9 Units, Subcutaneous, TID AC, Obey Santiago MD, 6 Units at 09/02/22 0757  •  Insulin Lispro (humaLOG) injection 3 Units, 3 Units, Subcutaneous, TID With Meals, Joan Chavarria MD, 3 Units at 09/02/22 0759  •  melatonin tablet 10 mg, 10 mg, Oral, Nightly PRN, Obey Santiago MD, 10 mg at 09/01/22  2108  •  mupirocin (BACTROBAN) 2 % nasal ointment 1 application, 1 application, Nasal, Q12H, Obey Santiago MD, 1 application at 09/02/22 0522  •  ondansetron (ZOFRAN) tablet 4 mg, 4 mg, Oral, Q6H PRN **OR** ondansetron (ZOFRAN) injection 4 mg, 4 mg, Intravenous, Q6H PRN, Obey Santiago MD, 4 mg at 08/31/22 1513  •  oxyCODONE (ROXICODONE) immediate release tablet 5 mg, 5 mg, Oral, Q4H PRN **OR** oxyCODONE (ROXICODONE) immediate release tablet 10 mg, 10 mg, Oral, Q4H PRN, Joan Chavarria MD, 10 mg at 09/01/22 1831  •  pantoprazole (PROTONIX) EC tablet 40 mg, 40 mg, Oral, QAM, Obey Santiago MD, 40 mg at 09/01/22 1145  •  scopolamine patch 1 mg/72 hr, 1 patch, Transdermal, Continuous, Obey Santiago MD, 1 patch at 08/31/22 0902  •  sennosides-docusate (PERICOLACE) 8.6-50 MG per tablet 2 tablet, 2 tablet, Oral, BID PRN, Obey Santiago MD  •  sodium chloride 0.9 % flush 10 mL, 10 mL, Intravenous, PRN, Obey Santiago MD  •  sodium chloride 0.9 % flush 10 mL, 10 mL, Intravenous, Q12H, Obey Santiago MD, 10 mL at 09/01/22 2112  •  sodium chloride 0.9 % flush 20 mL, 20 mL, Intravenous, PRN, Obey Santiago MD  •  sodium zirconium cyclosilicate (LOKELMA) pack 10 g, 10 g, Oral, Daily, Obey Santiago MD, 10 g at 09/01/22 1207  Allergies   Allergen Reactions   • Erythromycin Nausea Only and Other (See Comments)     nausea  upset stomach   • Erythromycin Base Other (See Comments)     nausea         Objective:  Vital Signs   Temp:  [97.2 °F (36.2 °C)-98.5 °F (36.9 °C)] 97.8 °F (36.6 °C)  Heart Rate:  [74-96] 93  Resp:  [16-18] 18  BP: ()/(64-94) 123/93    Results Review:   I reviewed the patient's new clinical results.  Results from last 7 days   Lab Units 09/01/22  0516 08/30/22  1413   WBC 10*3/mm3  --  5.65   HEMOGLOBIN g/dL 11.5* 11.7*   HEMATOCRIT % 36.1 35.8   PLATELETS 10*3/mm3  --  178     Results from last 7 days   Lab Units 09/02/22  0534   SODIUM  mmol/L 132*   POTASSIUM mmol/L 4.3   CHLORIDE mmol/L 96*   CO2 mmol/L 22.0   BUN mg/dL 26*   CREATININE mg/dL 5.98*   GLUCOSE mg/dL 158*   CALCIUM mg/dL 8.7         Results from last 7 days   Lab Units 09/02/22  0534   SODIUM mmol/L 132*   POTASSIUM mmol/L 4.3   CHLORIDE mmol/L 96*   CO2 mmol/L 22.0   BUN mg/dL 26*   CREATININE mg/dL 5.98*   CALCIUM mg/dL 8.7   GLUCOSE mg/dL 158*           Physical Exam:  General: comfortable, stable exam this a.m.  Vascular: 2+ radial pulse; digits are pink and well perfused  Operative Shoulder Exam: sling is in place  Neurologic: SLT intact distally well perfused   Dermatologic: surgical dressing is well appearing - clean, dry, and intact; no obvious signs or symptoms of infection or DVT        Primary localized osteoarthrosis of right shoulder region    Contracture of joint of right shoulder region    Biceps tendinitis of right upper extremity    Right shoulder pain    ESRD (end stage renal disease) ([facility])    HTN (hypertension)    DM2 (diabetes mellitus, type 2) ([facility])    Acute postoperative pain        Assessment/Plan:    Disposition: Patient awaiting transfer to [facility] on 9/5/2022 by report.  She will continue with hemodialysis     date of surgery 8/31/2022  Right anatomic total shoulder arthroplasty  POD #: 2  Pain control - well controlled  Drain - N/A  Dressing - clean and can be removed by patient on POD#3 at home.  Okay for RN to replace prior to discharge if dressing is saturated  Sling - in place  Activity - no weight bearing on affected side  PT/OT - sling teaching, non-weight bearing, elbow/wrist/hand exercises only, pendulums for showering on POD #3 at home and for dressing  DVT Prophylaxis - SCDs as inpatient, subcutaneous heparin as an inpatient and okay to resume her home aspirin and this can serve as her outpatient DVT prophylaxis  Antibiotics - completed postoperative antibiotic course  Diet - continue diet  Anesthesia - single shot block was completed  preoperatively    Future Appointments   Date Time Provider Department Center   9/13/2022  3:30 PM Obey Santiago MD [facility]     I discussed the patient's findings and my recommendations with patient    Please have the schedulers call our office at [phone] if a clinic appointment is needed.    Obey Santiago MD, FAAOS  Orthopedic Surgeon  Fellowship-trained Shoulder and Elbow Surgeon  [facility]  [address] [place] [address]. [address]  [place] [address]      Obey Santiago MD  09/02/22  12:31 EDT

## 2022-09-02 NOTE — PLAN OF CARE
Problem: Adult Inpatient Plan of Care  Goal: Plan of Care Review  Outcome: Ongoing, Progressing  Flowsheets (Taken 9/2/2022 1710)  Outcome Evaluation: Patient alert, oriented x 4, respiration unlabored and symmetrical on room air. Ambulates to bathroom with assistance, medicated for pain as ordered. Had dialysis today, monitoring continues.  Goal: Patient-Specific Goal (Individualized)  Outcome: Ongoing, Progressing  Goal: Absence of Hospital-Acquired Illness or Injury  Outcome: Ongoing, Progressing  Intervention: Identify and Manage Fall Risk  Recent Flowsheet Documentation  Taken 9/2/2022 0800 by Radha Chilel RN  Safety Promotion/Fall Prevention:   safety round/check completed   nonskid shoes/slippers when out of bed  Intervention: Prevent Skin Injury  Recent Flowsheet Documentation  Taken 9/2/2022 0800 by Radha Chilel RN  Body Position: position changed independently  Intervention: Prevent and Manage VTE (Venous Thromboembolism) Risk  Recent Flowsheet Documentation  Taken 9/2/2022 1257 by Radha Chilel RN  Activity Management: (patient unavailable) other (see comments)  Taken 9/2/2022 0800 by Radha Chilel RN  Activity Management: activity adjusted per tolerance  VTE Prevention/Management: (heparin) other (see comments)  Range of Motion: active ROM (range of motion) encouraged  Intervention: Prevent Infection  Recent Flowsheet Documentation  Taken 9/2/2022 0800 by Radha Chilel RN  Infection Prevention: environmental surveillance performed  Goal: Optimal Comfort and Wellbeing  Outcome: Ongoing, Progressing  Intervention: Provide Person-Centered Care  Recent Flowsheet Documentation  Taken 9/2/2022 0800 by Radha Chilel RN  Trust Relationship/Rapport:   care explained   questions answered  Goal: Readiness for Transition of Care  Outcome: Ongoing, Progressing  Goal: Plan of Care Review  Outcome: Ongoing, Progressing  Flowsheets (Taken 9/2/2022 1710)  Outcome Evaluation: Patient alert, oriented  x 4, respiration unlabored and symmetrical on room air. Ambulates to bathroom with assistance, medicated for pain as ordered. Had dialysis today, monitoring continues.  Goal: Patient-Specific Goal (Individualized)  Outcome: Ongoing, Progressing  Goal: Absence of Hospital-Acquired Illness or Injury  Outcome: Ongoing, Progressing  Intervention: Identify and Manage Fall Risk  Recent Flowsheet Documentation  Taken 9/2/2022 0800 by Radha Chilel RN  Safety Promotion/Fall Prevention:   safety round/check completed   nonskid shoes/slippers when out of bed  Intervention: Prevent Skin Injury  Recent Flowsheet Documentation  Taken 9/2/2022 0800 by Radha Chilel RN  Body Position: position changed independently  Intervention: Prevent and Manage VTE (Venous Thromboembolism) Risk  Recent Flowsheet Documentation  Taken 9/2/2022 1257 by Radha Chilel RN  Activity Management: (patient unavailable) other (see comments)  Taken 9/2/2022 0800 by Radha Chilel RN  Activity Management: activity adjusted per tolerance  VTE Prevention/Management: (heparin) other (see comments)  Range of Motion: active ROM (range of motion) encouraged  Intervention: Prevent Infection  Recent Flowsheet Documentation  Taken 9/2/2022 0800 by Radha Chilel RN  Infection Prevention: environmental surveillance performed  Goal: Optimal Comfort and Wellbeing  Outcome: Ongoing, Progressing  Intervention: Provide Person-Centered Care  Recent Flowsheet Documentation  Taken 9/2/2022 0800 by Radha Chilel RN  Trust Relationship/Rapport:   care explained   questions answered  Goal: Readiness for Transition of Care  Outcome: Ongoing, Progressing     Problem: Fall Injury Risk  Goal: Absence of Fall and Fall-Related Injury  Outcome: Ongoing, Progressing  Intervention: Identify and Manage Contributors  Recent Flowsheet Documentation  Taken 9/2/2022 0800 by Radha Chilel RN  Medication Review/Management: medications reviewed  Self-Care Promotion:  independence encouraged  Intervention: Promote Injury-Free Environment  Recent Flowsheet Documentation  Taken 9/2/2022 0800 by Radha Chilel RN  Safety Promotion/Fall Prevention:   safety round/check completed   nonskid shoes/slippers when out of bed   Goal Outcome Evaluation:              Outcome Evaluation: Patient alert, oriented x 4, respiration unlabored and symmetrical on room air. Ambulates to bathroom with assistance, medicated for pain as ordered. Had dialysis today, monitoring continues.

## 2022-09-02 NOTE — CASE MANAGEMENT/SOCIAL WORK
Discharge Planning Assessment  [facility]     Patient Name: Yashira Gudino  MRN: [id number]  Today's Date: 9/2/2022    Admit Date: 8/30/2022     Discharge Needs Assessment    No documentation.                Discharge Plan     Row Name 09/02/22 1215       Plan    Plan [place] on Monday and HD at [facility] to continue on Wednesday    Patient/Family in Agreement with Plan yes    Plan Comments Ms. Gudino is off the floor for HD at time of CM visit.    Contacted the Ms. Gudino's significant other, Shahzad, by phone, for discharge planning follow up.  Ms. Gudino has a skilled rehab bed and can transfer to  [facility] on Monday, 9/5/22, after her morning dialysis here in the hospital, if she is medically ready.    The patient is a Medicare inpatient as of today, 9/2/22, and will meet her inpatient stay on Monday, 9/5/22.  Confirmed bed with [facility] at [facility].  [phone] fax [phone], and notified her that Ms. Gudino would resume her HD at their facility, on Wednesday, 9/7/22, and would be transported to [facility] by [facility].  Ms. Gudino's chair is on Mon/Wed/Fri at 7am.   CM will arrange for [facility] wheelchair transport on Monday afternoon.  Shahzad is agreeable to DC plan.  Shahzad said he would discuss with Ms. Gudino this weekend, but she is agreeable to DC plan as well.     CM will follow up.    Final Discharge Disposition Code 03 - skilled nursing facility (SNF)                               Continued Care and Services - Admitted Since 8/30/2022     Destination Coordination complete.    Service Provider Request Status Selected Services Address Phone Fax Patient Preferred    [facility]   Selected Skilled Nursing [address] [place] [id number] [phone] --          Dialysis/Infusion Coordination complete.    Service Provider Request Status Selected Services Address Phone Fax Patient Preferred    [facility]    Selected Dialysis [address] [place], [place] [id number] [phone] --              Expected Discharge Date and Time     Expected Discharge Date Expected Discharge Time    Sep 5, 2022                    Keyana Brandon RN

## 2022-09-02 NOTE — THERAPY TREATMENT NOTE
Patient Name: Yashira Gudino  : 1960    MRN: [id number]                              Today's Date: 2022       Admit Date: 2022    Visit Dx:     ICD-10-CM ICD-9-CM   1. ESRD (end stage renal disease) ([facility])  N18.6 585.6   2. Primary localized osteoarthrosis of right shoulder region  M19.011 715.11   3. Contracture of joint of right shoulder region  M24.511 718.41   4. Biceps tendinitis of right upper extremity  M75.21 726.12   5. Right shoulder pain, unspecified chronicity  M25.511 719.41     Patient Active Problem List   Diagnosis   • Primary localized osteoarthrosis of right shoulder region   • Contracture of joint of right shoulder region   • Biceps tendinitis of right upper extremity   • Right shoulder pain   • ESRD (end stage renal disease) ([facility])   • HTN (hypertension)   • DM2 (diabetes mellitus, type 2) ([facility])   • Acute postoperative pain     Past Medical History:   Diagnosis Date   • Arthritis    • Charcot-Bertha-Tooth disease    • Chronic kidney disease (CKD), stage IV (severe) ([facility])    • Depression    • Diabetes ([facility])    • Dialysis patient ([facility])    • Fibromyalgia    • History of MRSA infection 2020    left finger, left foot   • Hypertension    • Ileostomy in place ([facility])    • Osteoporosis      Past Surgical History:   Procedure Laterality Date   • ANKLE SURGERY     •  SECTION     • COLON RESECTION     • COLONOSCOPY     • COLOSTOMY     • FOOT SURGERY Left     excise Small Toe   • HAND SURGERY Left     Excise IF   • NECK SURGERY     • TONSILLECTOMY     • TOTAL SHOULDER ARTHROPLASTY Right 2022    Procedure: TOTAL SHOULDER ARTHROPLASTY RIGHT;  Surgeon: Obey Santiago MD;  Location: [facility];  Service: Orthopedics;  Laterality: Right;   • TUBAL ABDOMINAL LIGATION        General Information     Row Name 22 1533          Physical Therapy Time and Intention    Document Type therapy note (daily note)  -ES     Mode of Treatment physical therapy  -ES     Row Name 22 1533           General Information    Patient Profile Reviewed yes  -ES     Existing Precautions/Restrictions non-weight bearing;right;shoulder;other (see comments);fall  Donjoy Ultra III sling with pillow, dialysis catheter L chest  -ES     Barriers to Rehab medically complex;previous functional deficit  -ES     Row Name 09/02/22 1533          Cognition    Orientation Status (Cognition) oriented x 4  -ES     Row Name 09/02/22 1533          Safety Issues, Functional Mobility    Safety Issues Affecting Function (Mobility) ability to follow commands;awareness of need for assistance;impulsivity;insight into deficits/self-awareness;safety precaution awareness;safety precautions follow-through/compliance;sequencing abilities  -ES     Impairments Affecting Function (Mobility) balance;endurance/activity tolerance;coordination;pain;strength  -ES           User Key  (r) = Recorded By, (t) = Taken By, (c) = Cosigned By    Initials Name Provider Type    ES Taniya Garces PT Physical Therapist               Mobility     Row Name 09/02/22 1535          Bed Mobility    Bed Mobility supine-sit  -ES     Supine-Sit [place] (Bed Mobility) contact guard;verbal cues;nonverbal cues (demo/gesture)  -ES     Assistive Device (Bed Mobility) bed rails;head of bed elevated  -ES     Row Name 09/02/22 1535          Sit-Stand Transfer    Sit-Stand [place] (Transfers) minimum assist (75% patient effort);1 person to manage equipment;verbal cues  -ES     Assistive Device (Sit-Stand Transfers) other (see comments)  LUE support  -ES     Comment, (Sit-Stand Transfer) Required min VC for proper sequencing.  -ES     Row Name 09/02/22 1535          Gait/Stairs (Locomotion)    [place] Level (Gait) minimum assist (75% patient effort);verbal cues  + chair follow  -ES     Assistive Device (Gait) other (see comments)  LUE support  -ES     Distance in Feet (Gait) 100  -ES     Deviations/Abnormal Patterns (Gait) bilateral deviations;douglas  decreased;gait speed decreased;base of support, narrow;stride length decreased;steppage  -ES     Bilateral Gait Deviations forward flexed posture  -ES     Walker Level (Stairs) not tested  -ES     Comment, (Gait/Stairs) Ambulated 100' with LUE support. Required mod VC during ambulation as pt veered R.  -ES     Row Name 09/02/22 1535          Mobility    Extremity Weight-bearing Status right upper extremity  -ES     Right Upper Extremity (Weight-bearing Status) non weight-bearing (NWB)  -ES           User Key  (r) = Recorded By, (t) = Taken By, (c) = Cosigned By    Initials Name Provider Type    ES Taniya Garces, NAILA Physical Therapist               Obj/Interventions     Row Name 09/02/22 1540          Balance    Balance Assessment sitting static balance;sitting dynamic balance;sit to stand dynamic balance;standing static balance;standing dynamic balance  -ES     Static Sitting Balance supervision  -ES     Dynamic Sitting Balance contact guard  -ES     Position, Sitting Balance unsupported;sitting edge of bed  -ES     Sit to Stand Dynamic Balance minimal assist  -ES     Static Standing Balance verbal cues;minimal assist  -ES     Dynamic Standing Balance minimal assist;1 person to manage equipment;verbal cues  -ES     Position/Device Used, Standing Balance supported;other (see comments)  LUE support  -ES     Balance Interventions sitting;standing;sit to stand;supported;dynamic;static;dynamic reaching  -ES           User Key  (r) = Recorded By, (t) = Taken By, (c) = Cosigned By    Initials Name Provider Type    ES Taniya Garces, PT Physical Therapist               Goals/Plan    No documentation.                Clinical Impression     Row Name 09/02/22 1544          Pain    Pretreatment Pain Rating 7/10  -ES     Posttreatment Pain Rating 8/10  -ES     Pain Location - Side/Orientation Right  -ES     Pain Location generalized  -ES     Pain Location - shoulder  -ES     Pain Intervention(s)  Repositioned;Ambulation/increased activity  -ES     Row Name 09/02/22 1544          Plan of Care Review    Plan of Care Reviewed With patient  -ES     Progress improving  -ES     Outcome Evaluation Pt demo'd improved transfer and gait tolerance today. Pt continues to be unsteady on feet and requires LUE support and mod VC for safety while ambulating. Pain managed well during session and not a limiting factory. OT present at end of session. Continue to rec IPR at this time.  -ES     Row Name 09/02/22 1544          Therapy Assessment/Plan (PT)    Rehab Potential (PT) good, to achieve stated therapy goals  -ES     Criteria for Skilled Interventions Met (PT) yes;meets criteria;skilled treatment is necessary  -ES     Therapy Frequency (PT) daily  -ES     Row Name 09/02/22 1544          Vital Signs    Pre Systolic BP Rehab --  VSS  -ES     Pre Patient Position Supine  -ES     Intra Patient Position Standing  -ES     Post Patient Position Sitting  -ES     Row Name 09/02/22 1544          Positioning and Restraints    Pre-Treatment Position in bed  -ES     Post Treatment Position chair  -ES     In Chair notified nsg;reclined;sitting;call light within reach;encouraged to call for assist;exit alarm on;with OT;heels elevated;legs elevated  -ES           User Key  (r) = Recorded By, (t) = Taken By, (c) = Cosigned By    Initials Name Provider Type    ES Taniya Garces, PT Physical Therapist               Outcome Measures     Row Name 09/02/22 1548 09/02/22 0800       How much help from another person do you currently need...    Turning from your back to your side while in flat bed without using bedrails? 3  -ES 3  -JM    Moving from lying on back to sitting on the side of a flat bed without bedrails? 3  -ES 3  -JM    Moving to and from a bed to a chair (including a wheelchair)? 3  -ES 2  -JM    Standing up from a chair using your arms (e.g., wheelchair, bedside chair)? 3  -ES 2  -JM    Climbing 3-5 steps with a railing? 2  -ES  2  -JM    To walk in hospital room? 2  -ES 2  -JM    AM-PAC 6 Clicks Score (PT) 16  -ES 14  -JM    Highest level of mobility 5 --> Static standing  -ES 4 --> Transferred to chair/commode  -    Row Name 09/02/22 1548          Functional Assessment    Outcome Measure Options AM-PAC 6 Clicks Basic Mobility (PT)  -ES           User Key  (r) = Recorded By, (t) = Taken By, (c) = Cosigned By    Initials Name Provider Type     Radha Chilel, RN Registered Nurse    Taniya Aldrich, PT Physical Therapist                             Physical Therapy Education                 Title: PT OT SLP Therapies (Done)     Topic: Physical Therapy (Done)     Point: Mobility training (Done)     Learning Progress Summary           Patient Acceptance, E,TB, VU by EZEQUIEL at 9/1/2022 1603                   Point: Home exercise program (Done)     Learning Progress Summary           Patient Acceptance, E,TB, VU by EZEQUIEL at 9/1/2022 1603                   Point: Body mechanics (Done)     Learning Progress Summary           Patient Acceptance, E,TB, VU by EZEQUIEL at 9/1/2022 1603                   Point: Precautions (Done)     Learning Progress Summary           Patient Acceptance, E,TB, VU by EZEQUIEL at 9/1/2022 1603                               User Key     Initials Effective Dates Name Provider Type Discipline     08/11/22 -  Taniya Garces, NAILA Physical Therapist PT              PT Recommendation and Plan  Planned Therapy Interventions (PT): balance training, bed mobility training, gait training, patient/family education, stair training, strengthening, transfer training  Plan of Care Reviewed With: patient  Progress: improving  Outcome Evaluation: Pt demo'd improved transfer and gait tolerance today. Pt continues to be unsteady on feet and requires LUE support and mod VC for safety while ambulating. Pain managed well during session and not a limiting factory. OT present at end of session. Continue to rec IPR at this time.     Time Calculation:     PT Charges     Row Name 09/02/22 1551             Time Calculation    Start Time 1515  -ES      PT Received On 09/02/22  -ES      PT Goal Re-Cert Due Date 09/11/22  -ES              Time Calculation- PT    Total Timed Code Minutes- PT 17 minute(s)  -ES              Timed Charges    40136 - Gait Training Minutes  17  -ES              Total Minutes    Timed Charges Total Minutes 17  -ES       Total Minutes 17  -ES            User Key  (r) = Recorded By, (t) = Taken By, (c) = Cosigned By    Initials Name Provider Type    ES Taniya Garces PT Physical Therapist              Therapy Charges for Today     Code Description Service Date Service Provider Modifiers Qty    01060797649 HC PT EVAL MOD COMPLEXITY 3 9/1/2022 Taniya Garces, PT GP 1    33113910374 HC GAIT TRAINING EA 15 MIN 9/1/2022 Taniya Garces, PT GP 1    19607573506 HC GAIT TRAINING EA 15 MIN 9/2/2022 Taniya Garces, PT GP 1          PT G-Codes  Outcome Measure Options: AM-PAC 6 Clicks Basic Mobility (PT)  AM-PAC 6 Clicks Score (PT): 16  AM-PAC 6 Clicks Score (OT): 14    Taniya Garces PT  9/2/2022

## 2022-09-02 NOTE — CONSULTS
"Diabetes Education    Patient Name:  Yashira Gudino  YOB: 1960  MRN: [id number]  Admit Date:  8/30/2022        Saw pt in dialysis for diabetes education consult. Pt shares she sees endocrinology at [facility]. She confirms she takes lantus and novolog--lantus is 20 units AM and 10 units PM, and novolog is taken per sliding scale, she estimates typical dose is 5-8 units. She states she has not seen endocrinologist in \"a while\" but has appt scheduled in September.-noted per Epic chart review appt scheduled for 9/27. Pt states she uses freestyle zhang for glucose monitoring at home and has glucose meter as well for fingerstick if needed. She reports lows occasionally in the morning, fasting--especially if she has taken correction the evening before w/ her evening meal. She tries to eat snack before bed but reports she eats supper at 6:30 pm and is usually in bed by 8-8:30 pm so is not always hungry before bed. Encouraged her to discuss all lows with her endo provider. She receives her insulin and pen needles [facility] and states she needs refill on pen needles, instructed her to call the pharmacy and if no refills available they would contact provider office for renewal of prescription. We reviewed current A1c 8.0 and possible limitations of test given ESRD and dialysis. Thank you for the consult.       Electronically signed by:  Gabi Urena RN, [facility]  09/02/22 11:18 EDT  "

## 2022-09-02 NOTE — PLAN OF CARE
Problem: Adult Inpatient Plan of Care  Goal: Plan of Care Review  Recent Flowsheet Documentation  Taken 9/2/2022 1609 by Olinda eG OT  Plan of Care Reviewed With: patient  Outcome Evaluation: Pt is A/Ox4 and participates in therapy with frequent redirection to tasks. Up in room and transfering with Min Ax1. Unsteady gait. Education reinforced for RUE precautions, sling teaching, and ADL retraining. Max A UB bathing, dressing, and sling application. RUE HEP completed per MD parameters with AROM hand and AAROM wrist/elbow. OT will continue to follow IP. Plan is SNF at d/c.

## 2022-09-02 NOTE — THERAPY TREATMENT NOTE
Patient Name: Yashira Gudino  : 1960    MRN: [id number]                              Today's Date: 2022       Admit Date: 2022    Visit Dx:     ICD-10-CM ICD-9-CM   1. ESRD (end stage renal disease) (Formerly [facility])  N18.6 585.6   2. Primary localized osteoarthrosis of right shoulder region  M19.011 715.11   3. Contracture of joint of right shoulder region  M24.511 718.41   4. Biceps tendinitis of right upper extremity  M75.21 726.12   5. Right shoulder pain, unspecified chronicity  M25.511 719.41     Patient Active Problem List   Diagnosis   • Primary localized osteoarthrosis of right shoulder region   • Contracture of joint of right shoulder region   • Biceps tendinitis of right upper extremity   • Right shoulder pain   • ESRD (end stage renal disease) (Formerly [facility])   • HTN (hypertension)   • DM2 (diabetes mellitus, type 2) (Formerly [facility])   • Acute postoperative pain     Past Medical History:   Diagnosis Date   • Arthritis    • Charcot-Bertha-Tooth disease    • Chronic kidney disease (CKD), stage IV (severe) (Formerly [facility])    • Depression    • Diabetes (Formerly [facility])    • Dialysis patient (Formerly [facility])    • Fibromyalgia    • History of MRSA infection 2020    left finger, left foot   • Hypertension    • Ileostomy in place (Formerly [facility])    • Osteoporosis      Past Surgical History:   Procedure Laterality Date   • ANKLE SURGERY     •  SECTION     • COLON RESECTION     • COLONOSCOPY     • COLOSTOMY     • FOOT SURGERY Left     excise Small Toe   • HAND SURGERY Left     Excise IF   • NECK SURGERY     • TONSILLECTOMY     • TOTAL SHOULDER ARTHROPLASTY Right 2022    Procedure: TOTAL SHOULDER ARTHROPLASTY RIGHT;  Surgeon: Obey Santiago MD;  Location: [facility];  Service: Orthopedics;  Laterality: Right;   • TUBAL ABDOMINAL LIGATION        General Information     Row Name 22 1558          OT Time and Intention    Document Type therapy note (daily note)  -TB     Mode of Treatment occupational therapy  -TB     Row Name 22 1558           General Information    Patient Profile Reviewed yes  -TB     Existing Precautions/Restrictions fall;right;shoulder;non-weight bearing;other (see comments)  R shoulder precautions, sling with abduction pillow, L subclavian dialysis catheter  -TB     Barriers to Rehab medically complex;previous functional deficit  -TB     Row Name 09/02/22 1558          Occupational Profile    Reason for Services/Referral (Occupational Profile) Occupational decline  -TB     Environmental Supports and Barriers (Occupational Profile) Pt lives with her SO who works days. Plan is SNF at d/c  -TB     Row Name 09/02/22 1558          Cognition    Orientation Status (Cognition) oriented x 4  -TB     Row Name 09/02/22 1558          Safety Issues, Functional Mobility    Safety Issues Affecting Function (Mobility) ability to follow commands;awareness of need for assistance;impulsivity;insight into deficits/self-awareness;problem-solving;safety precaution awareness;safety precautions follow-through/compliance;sequencing abilities  -TB     Impairments Affecting Function (Mobility) balance;endurance/activity tolerance;pain;strength;range of motion (ROM);postural/trunk control  -TB           User Key  (r) = Recorded By, (t) = Taken By, (c) = Cosigned By    Initials Name Provider Type    TB Olinda Ge OT Occupational Therapist                 Mobility/ADL's     Row Name 09/02/22 1601          Bed Mobility    Comment, (Bed Mobility) Pt rec'vd sitting EOB  -TB     Row Name 09/02/22 1601          Transfers    Transfers sit-stand transfer;bed-chair transfer  -TB     Comment, (Transfers) Cues for hand placement, sequencing, and safety  -TB     Bed-Chair [place] (Transfers) minimum assist (75% patient effort);1 person assist;verbal cues  -TB     Sit-Stand [place] (Transfers) minimum assist (75% patient effort);1 person assist;verbal cues  -TB     Row Name 09/02/22 1601          Sit-Stand Transfer    Comment, (Sit-Stand Transfer) HIRA  support  -     Row Name 09/02/22 1601          Functional Mobility    Functional Mobility- Ind. Level minimum assist (75% patient effort);1 person  -TB     Functional Mobility-Maintain WBing cues to maintain weight bearing status  -TB     Functional Mobility- Safety Issues step length decreased;sequencing ability decreased;balance decreased during turns  -TB     Functional Mobility- Comment Pt up in room with Min Ax1 provided LUE. Unsteady.  -     Row Name 09/02/22 1601          Activities of Daily Living    BADL Assessment/Intervention bathing;upper body dressing;lower body dressing  -     Row Name 09/02/22 1601          Mobility    Extremity Weight-bearing Status right upper extremity  -TB     Right Upper Extremity (Weight-bearing Status) non weight-bearing (NWB)   -     Row Name 09/02/22 1601          Bathing Assessment/Intervention    [place] Level (Bathing) maximum assist (25% patient effort);upper body;bathing skills  -TB     Position (Bathing) unsupported sitting  -TB     Comment, (Bathing) Education reinforced for R shoulder precautions and axilla care. No caregiver present for teaching.  -     Row Name 09/02/22 1601          Upper Body Dressing Assessment/Training    [place] Level (Upper Body Dressing) doff;don;pajama/robe;maximum assist (25% patient effort);verbal cues  sling mgmt/proper fit  -TB     Position (Upper Body Dressing) unsupported sitting  -TB     Comment, (Upper Body Dressing) Education reinforced for R shoulder precautions, sling teaching, and ADL retraining. No caregiver present for teaching.  -     Row Name 09/02/22 1601          Lower Body Dressing Assessment/Training    [place] Level (Lower Body Dressing) don;shoes/slippers;set up  -TB     Position (Lower Body Dressing) edge of bed sitting  -TB           User Key  (r) = Recorded By, (t) = Taken By, (c) = Cosigned By    Initials Name Provider Type    TB Olinda Ge, OT Occupational Therapist                Obj/Interventions     Row Name 09/02/22 1607          Elbow/Forearm (Therapeutic Exercise)    Elbow/Forearm (Therapeutic Exercise) AAROM (active assistive range of motion)  -TB     Elbow/Forearm AAROM (Therapeutic Exercise) right;flexion;extension;supination;pronation;sitting;10 repetitions  -TB     [facility] Name 09/02/22 1607          Wrist (Therapeutic Exercise)    Wrist (Therapeutic Exercise) AAROM (active assistive range of motion)  -TB     Wrist AAROM (Therapeutic Exercise) right;flexion;extension;10 repetitions  -TB     [facility] Name 09/02/22 1607          Hand (Therapeutic Exercise)    Hand AROM/AAROM (Therapeutic Exercise) right;AROM (active range of motion);finger flexion;finger extension;10 repetitions  -[facility] Name 09/02/22 1607          Motor Skills    Therapeutic Exercise hand;wrist;elbow/forearm  Education reinforced for RUE ROM HEP per MD parameters @ hand, wrist, & elbow only. No caregiver present for teaching  -[facility] Name 09/02/22 1607          Balance    Balance Assessment sitting dynamic balance;sit to stand dynamic balance;standing dynamic balance  -     Dynamic Sitting Balance standby assist;verbal cues  -TB     Position, Sitting Balance unsupported;sitting in chair;sitting edge of bed  -     Sit to Stand Dynamic Balance minimal assist;1-person assist;verbal cues  -TB     Dynamic Standing Balance minimal assist;1-person assist;verbal cues  -TB     Position/Device Used, Standing Balance supported  -TB     Balance Interventions sitting;standing;sit to stand;supported;dynamic;occupation based/functional task;UE activity with balance activity  -TB           User Key  (r) = Recorded By, (t) = Taken By, (c) = Cosigned By    Initials Name Provider Type    TB Olinda Ge OT Occupational Therapist               Goals/Plan    No documentation.                Clinical Impression     [facility] Name 09/02/22 1609          Pain Assessment    Pretreatment Pain Rating 8/10  -TB     Posttreatment  Pain Rating 8/10  -TB     Pain Location - Side/Orientation Right  -TB     Pain Location generalized  -TB     Pain Location - shoulder  -TB     Pre/Posttreatment Pain Comment Pt tolerates ADL and HEP well  -TB     Pain Intervention(s) Ambulation/increased activity;Repositioned;Rest  -TB     Row Name 09/02/22 1609          Plan of Care Review    Plan of Care Reviewed With patient  -TB     Outcome Evaluation Pt is A/Ox4 and participates in therapy with frequent redirection to tasks. Up in room and transfering with Min Ax1. Unsteady gait. Education reinforced for RUE precautions, sling teaching, and ADL retraining. Max A UB bathing, dressing, and sling application. RUE HEP completed per MD parameters with AROM hand and AAROM wrist/elbow. OT will continue to follow IP. Plan is SNF at d/c.  -TB     Row Name 09/02/22 1605          Therapy Plan Review/Discharge Plan (OT)    Anticipated Discharge Disposition (OT) skilled nursing facility  -TB     Row Name 09/02/22 1609          Vital Signs    Pre Systolic BP Rehab --  RN cleared OT  -TB     Pre SpO2 (%) 93  -TB     O2 Delivery Pre Treatment room air  -TB     O2 Delivery Intra Treatment room air  -TB     Post SpO2 (%) 93  -TB     O2 Delivery Post Treatment room air  -TB     Pre Patient Position Sitting  -TB     Intra Patient Position Standing  -TB     Post Patient Position Sitting  -TB     Row Name 09/02/22 1603          Positioning and Restraints    Pre-Treatment Position in bed  -TB     Post Treatment Position chair  -TB     In Chair notified nsg;reclined;call light within reach;encouraged to call for assist;with brace;waffle cushion;legs elevated  -TB           User Key  (r) = Recorded By, (t) = Taken By, (c) = Cosigned By    Initials Name Provider Type    TB Olinda Ge OT Occupational Therapist               Outcome Measures     Row Name 09/02/22 1619          How much help from another is currently needed...    Putting on and taking off regular lower body  clothing? 2  -TB     Bathing (including washing, rinsing, and drying) 2  -TB     Toileting (which includes using toilet bed pan or urinal) 2  -TB     Putting on and taking off regular upper body clothing 2  -TB     Taking care of personal grooming (such as brushing teeth) 3  -TB     Eating meals 3  -TB     AM-PAC 6 Clicks Score (OT) 14  -TB     Row Name 09/02/22 1548 09/02/22 0800       How much help from another person do you currently need...    Turning from your back to your side while in flat bed without using bedrails? 3  -ES 3  -JM    Moving from lying on back to sitting on the side of a flat bed without bedrails? 3  -ES 3  -JM    Moving to and from a bed to a chair (including a wheelchair)? 3  -ES 2  -JM    Standing up from a chair using your arms (e.g., wheelchair, bedside chair)? 3  -ES 2  -JM    Climbing 3-5 steps with a railing? 2  -ES 2  -JM    To walk in hospital room? 2  -ES 2  -JM    AM-PAC 6 Clicks Score (PT) 16  -ES 14  -JM    Highest level of mobility 5 --> Static standing  -ES 4 --> Transferred to chair/commode  -    Row Name 09/02/22 1613 09/02/22 1548       Functional Assessment    Outcome Measure Options AM-PAC 6 Clicks Daily Activity (OT)  -TB AM-PAC 6 Clicks Basic Mobility (PT)  -ES          User Key  (r) = Recorded By, (t) = Taken By, (c) = Cosigned By    Initials Name Provider Type    TB Olinda Ge OT Occupational Therapist    Radha Regalado, RN Registered Nurse    Taniya Aldrich, PT Physical Therapist                Occupational Therapy Education                 Title: PT OT SLP Therapies (Done)     Topic: Occupational Therapy (Done)     Point: ADL training (Done)     Description:   Instruct learner(s) on proper safety adaptation and remediation techniques during self care or transfers.   Instruct in proper use of assistive devices.              Learning Progress Summary           Patient Acceptance, E,D, VU,NR by CHARISMA at 9/2/2022 1613    Eager, E,TB,D,H, VU,NR by AR  at 9/1/2022 1741   Significant Other Eager, E,TB,D,H, VU,NR by AR at 9/1/2022 1741                   Point: Home exercise program (Done)     Description:   Instruct learner(s) on appropriate technique for monitoring, assisting and/or progressing therapeutic exercises/activities.              Learning Progress Summary           Patient Acceptance, E,D, VU,NR by TB at 9/2/2022 1613    Eager, E,TB,D,H, VU,NR by AR at 9/1/2022 1741   Significant Other Eager, E,TB,D,H, VU,NR by AR at 9/1/2022 1741                   Point: Precautions (Done)     Description:   Instruct learner(s) on prescribed precautions during self-care and functional transfers.              Learning Progress Summary           Patient Acceptance, E,D, VU,NR by TB at 9/2/2022 1613    Eager, E,TB,D,H, VU,NR by AR at 9/1/2022 1741   Significant Other Eager, E,TB,D,H, VU,NR by AR at 9/1/2022 1741                   Point: Body mechanics (Done)     Description:   Instruct learner(s) on proper positioning and spine alignment during self-care, functional mobility activities and/or exercises.              Learning Progress Summary           Patient Eager, E,TB,D,H, VU,NR by AR at 9/1/2022 1741   Significant Other Eager, E,TB,D,H, VU,NR by AR at 9/1/2022 1741                               User Key     Initials Effective Dates Name Provider Type Discipline    TB 06/16/21 -  Olinda Ge, OT Occupational Therapist OT    AR 06/16/21 -  Luciana Purcell OT Occupational Therapist OT              OT Recommendation and Plan     Plan of Care Review  Plan of Care Reviewed With: patient  Outcome Evaluation: Pt is A/Ox4 and participates in therapy with frequent redirection to tasks. Up in room and transfering with Min Ax1. Unsteady gait. Education reinforced for RUE precautions, sling teaching, and ADL retraining. Max A UB bathing, dressing, and sling application. RUE HEP completed per MD parameters with AROM hand and AAROM wrist/elbow. OT will continue to  follow IP. Plan is SNF at d/c.     Time Calculation:    Time Calculation- OT     Row Name 09/02/22 1551 09/02/22 1518          Time Calculation- OT    OT Start Time -- 1518  -TB     OT Received On -- 09/02/22  -TB     OT Goal Re-Cert Due Date -- 09/11/22  -TB            Timed Charges    72323 - OT Therapeutic Exercise Minutes -- 13  -TB     17498 - Gait Training Minutes  17  -ES --     47796 - OT Self Care/Mgmt Minutes -- 25  -TB            Total Minutes    Timed Charges Total Minutes 17  -ES 38  -TB      Total Minutes 17  -ES 38  -TB           User Key  (r) = Recorded By, (t) = Taken By, (c) = Cosigned By    Initials Name Provider Type    TB Olinda Ge OT Occupational Therapist    ES Taniya Garces, PT Physical Therapist              Therapy Charges for Today     Code Description Service Date Service Provider Modifiers Qty    99055106972 HC OT THER PROC EA 15 MIN 9/2/2022 Olinda Ge OT GO 1    01464226541 HC OT SELF CARE/MGMT/TRAIN EA 15 MIN 9/2/2022 Olinda Ge OT GO 2               Olinda Ge OT  9/2/2022

## 2022-09-02 NOTE — PROGRESS NOTES
"IM progress note      Yashira Gudino  [id number]  1960     LOS: 0 days     Attending: Joan Chavarria MD    Primary Care Provider: Karen Noguera MD      Chief Complaint/Reason for visit: Right shoulder pain    Subjective   Feels ok. Seen in her room after HD. Some postop pain. Low BG this am. ( knew she should have had a snack as her hs blood sugar was 130).     Objective        Visit Vitals  /88 (BP Location: Left arm, Patient Position: Lying)   Pulse 102   Temp 99.3 °F (37.4 °C) (Oral)   Resp 18   Ht 157.5 cm (62\")   Wt 67.8 kg (149 lb 7.6 oz)   SpO2 95%   BMI 27.34 kg/m²     Temp (24hrs), Av.2 °F (36.8 °C), Min:97.2 °F (36.2 °C), Max:99.3 °F (37.4 °C)      Nutrition: PO    Respiratory: RA    Occupational Therapy: pending    Physical Exam:     General Appearance:    Alert, cooperative, in no acute distress   Head:    Normocephalic, without obvious abnormality, atraumatic    Lungs:     Normal effort, symmetric chest rise, no crepitus, clear to      auscultation bilaterally             Heart:    Regular rhythm and normal rate, normal S1 and S2   Abdomen:     Soft and benign with pink ileostomy and output in stoma bag   Extremities:  RUE sling.  Surgical dressing CDI.  Good movement and cap refill right digits   Pulses:   Pulses palpable and equal bilaterally   Skin:   No bleeding, bruising or rash   Neurologic:    Cranial nerves 2 - 12 grossly intact     Results Review:     I reviewed the patient's new clinical results.   Results from last 7 days   Lab Units 22  0516 22  1413   WBC 10*3/mm3  --  5.65   HEMOGLOBIN g/dL 11.5* 11.7*   HEMATOCRIT % 36.1 35.8   PLATELETS 10*3/mm3  --  178     Results from last 7 days   Lab Units 22  0534 22  0940 22  0620 22  0647   SODIUM mmol/L 132*  --  120* 129*   POTASSIUM mmol/L 4.3 3.5 8.2* 5.7*   CHLORIDE mmol/L 96*  --  89* 96*   CO2 mmol/L 22.0  --  18.0* 22.0   BUN mg/dL 26*  --  55* 34*   CREATININE mg/dL 5.98* "  --  8.34* 7.35*   CALCIUM mg/dL 8.7  --  8.5* 8.5*   GLUCOSE mg/dL 158*  --  544* 174*     I reviewed the patient's new imaging including images and reports.    All medications reviewed.   acetaminophen, 1,000 mg, Oral, Q8H  amLODIPine, 10 mg, Oral, Nightly  bumetanide, 4 mg, Oral, BID  carvedilol, 25 mg, Oral, BID With Meals  cetirizine, 5 mg, Oral, Daily  citalopram, 40 mg, Oral, Daily  cyclobenzaprine, 10 mg, Oral, TID  gabapentin, 600 mg, Oral, Q12H  heparin (porcine), 5,000 Units, Subcutaneous, Q8H  insulin detemir, 10 Units, Subcutaneous, Nightly  insulin detemir, 20 Units, Subcutaneous, QAM  insulin lispro, 0-9 Units, Subcutaneous, TID AC  Insulin Lispro, 3 Units, Subcutaneous, TID With Meals  mupirocin, 1 application, Nasal, Q12H  pantoprazole, 40 mg, Oral, QAM  sodium chloride, 10 mL, Intravenous, Q12H  sodium zirconium cyclosilicate, 10 g, Oral, Daily        Assessment & Plan    Hyperkalemia, being managed with dialysis    Primary localized osteoarthrosis of right shoulder region    Status post right total shoulder arthroplasty    Contracture of joint of right shoulder region    Biceps tendinitis of right upper extremity    Right shoulder pain    ESRD (end stage renal disease) (HCC)    HTN (hypertension)    DM2 (diabetes mellitus, type 2), am hypoglycmia    Acute postoperative pain      Plan  1. PT/OT- RUE sling  2. Pain control-prns. Added multimodals   3. IS-encouraged  4. DVT proph- mechs/heparin  5. Bowel regimen  6. Monitor post-op labs  7. DC planning for [place] , Monday after HD.    - Hypertension:  Resume home medications as appropriate, formulary substitution when indicated.  Holding parameters.  Prn medications for elevated blood pressure.     -DM type 2, brittle.  Hgb A1C 8  FSBG AC/HS, (q 6 when NPO), along with correction humalog.  Long acting insulin.  Prandial Humalog 3 units added as well  Added 3 am FSBG.    -ESRD  - nephrology following  - HD currently.  Following potassium      [place]  MD Aura  09/02/22  13:40 EDT

## 2022-09-02 NOTE — PROGRESS NOTES
"   LOS: 0 days    Patient Care Team:  Karen Noguera MD as PCP - General (Family Medicine)    Reason For Visit:  F/U ESRD. SEEN ON DIALYSIS  Subjective           Review of Systems:    Pulm: No soa   CV:  No CP      Objective     acetaminophen, 1,000 mg, Oral, Q8H  amLODIPine, 10 mg, Oral, Nightly  bumetanide, 4 mg, Oral, BID  carvedilol, 25 mg, Oral, BID With Meals  cetirizine, 5 mg, Oral, Daily  citalopram, 40 mg, Oral, Daily  cyclobenzaprine, 10 mg, Oral, TID  gabapentin, 600 mg, Oral, Q12H  heparin (porcine), 5,000 Units, Subcutaneous, Q8H  insulin detemir, 10 Units, Subcutaneous, Nightly  insulin detemir, 20 Units, Subcutaneous, QAM  insulin lispro, 0-9 Units, Subcutaneous, TID AC  Insulin Lispro, 3 Units, Subcutaneous, TID With Meals  mupirocin, 1 application, Nasal, Q12H  pantoprazole, 40 mg, Oral, QAM  sodium chloride, 10 mL, Intravenous, Q12H  sodium zirconium cyclosilicate, 10 g, Oral, Daily      Scopolamine, 1 patch          Vital Signs:  Blood pressure 131/77, pulse 83, temperature 98.1 °F (36.7 °C), temperature source Oral, resp. rate 18, height 157.5 cm (62\"), weight 67.8 kg (149 lb 7.6 oz), SpO2 96 %.    Flowsheet Rows    Flowsheet Row First Filed Value   Admission Height 157.5 cm (62\") Documented at 08/30/2022 1512   Admission Weight 67.8 kg (149 lb 7.6 oz) Documented at 08/30/2022 1512          09/01 0701 - 09/02 0700  In: 850 [P.O.:600]  Out: 2420     Physical Exam:    General Appearance: NAD, alert and cooperative, Ox3. THD CATH.   Eyes: PER, conjunctivae and sclerae normal, no icterus  Lungs: respirations regular and unlabored, no crepitus, clear to auscultation  Heart/CV: regular rhythm & normal rate, no murmur, no gallop, no rub and TR DEPENDENT edema  Abdomen: not distended, soft, non-tender, no masses,  bowel sounds present  Skin: No rash, Warm and dry    Radiology:      Renal Imaging:        Labs:  Results from last 7 days   Lab Units 09/01/22  0516 08/30/22  1413   WBC 10*3/mm3  --  " 5.65   HEMOGLOBIN g/dL 11.5* 11.7*   HEMATOCRIT % 36.1 35.8   PLATELETS 10*3/mm3  --  178     Results from last 7 days   Lab Units 09/02/22  0534 09/01/22  0940 09/01/22  0620 08/31/22  0647 08/30/22  1413   SODIUM mmol/L 132*  --  120* 129* 129*   POTASSIUM mmol/L 4.3 3.5 8.2* 5.7* 5.6*   CHLORIDE mmol/L 96*  --  89* 96* 93*   CO2 mmol/L 22.0  --  18.0* 22.0 24.0   BUN mg/dL 26*  --  55* 34* 43*   CREATININE mg/dL 5.98*  --  8.34* 7.35* 8.68*   CALCIUM mg/dL 8.7  --  8.5* 8.5* 8.6   PHOSPHORUS mg/dL 5.9*  --   --   --  7.3*   ALBUMIN g/dL 4.00  --   --   --  3.90     Results from last 7 days   Lab Units 09/02/22  0534   GLUCOSE mg/dL 158*                       Estimated Creatinine Clearance: 9 mL/min (A) (by C-G formula based on SCr of 5.98 mg/dL (H)).      Assessment       Primary localized osteoarthrosis of right shoulder region    Contracture of joint of right shoulder region    Biceps tendinitis of right upper extremity    Right shoulder pain    ESRD (end stage renal disease) ([facility])    HTN (hypertension)    DM2 (diabetes mellitus, type 2) (Formerly [facility])    Acute postoperative pain            Impression: ESRD. HYPERKALEMIA IS BETTER. HYPONATREMIA. ANEMIA.            Recommendations: HD TODAY. DISCHARGE OK WITH RENAL.      Nelson Shelby MD  09/02/22  08:53 EDT

## 2022-09-03 LAB
GLUCOSE BLDC GLUCOMTR-MCNC: 100 MG/DL (ref 70–130)
GLUCOSE BLDC GLUCOMTR-MCNC: 145 MG/DL (ref 70–130)
GLUCOSE BLDC GLUCOMTR-MCNC: 156 MG/DL (ref 70–130)
GLUCOSE BLDC GLUCOMTR-MCNC: 193 MG/DL (ref 70–130)
GLUCOSE BLDC GLUCOMTR-MCNC: 51 MG/DL (ref 70–130)
GLUCOSE BLDC GLUCOMTR-MCNC: 87 MG/DL (ref 70–130)

## 2022-09-03 PROCEDURE — 63710000001 INSULIN DETEMIR PER 5 UNITS: Performed by: NURSE PRACTITIONER

## 2022-09-03 PROCEDURE — 25010000002 HYDROMORPHONE PER 4 MG: Performed by: ORTHOPAEDIC SURGERY

## 2022-09-03 PROCEDURE — 25010000002 HEPARIN (PORCINE) PER 1000 UNITS: Performed by: ORTHOPAEDIC SURGERY

## 2022-09-03 PROCEDURE — 63710000001 INSULIN LISPRO (HUMAN) PER 5 UNITS: Performed by: ORTHOPAEDIC SURGERY

## 2022-09-03 PROCEDURE — 97116 GAIT TRAINING THERAPY: CPT | Performed by: GENERAL ACUTE CARE HOSPITAL

## 2022-09-03 PROCEDURE — 97110 THERAPEUTIC EXERCISES: CPT | Performed by: GENERAL ACUTE CARE HOSPITAL

## 2022-09-03 PROCEDURE — 82962 GLUCOSE BLOOD TEST: CPT

## 2022-09-03 PROCEDURE — 63710000001 INSULIN LISPRO (HUMAN) PER 5 UNITS: Performed by: INTERNAL MEDICINE

## 2022-09-03 RX ADMIN — HEPARIN SODIUM 5000 UNITS: 5000 INJECTION INTRAVENOUS; SUBCUTANEOUS at 21:47

## 2022-09-03 RX ADMIN — HYDROMORPHONE HYDROCHLORIDE 0.5 MG: 1 INJECTION, SOLUTION INTRAMUSCULAR; INTRAVENOUS; SUBCUTANEOUS at 08:00

## 2022-09-03 RX ADMIN — CYCLOBENZAPRINE 10 MG: 10 TABLET, FILM COATED ORAL at 09:06

## 2022-09-03 RX ADMIN — INSULIN LISPRO 3 UNITS: 100 INJECTION, SOLUTION INTRAVENOUS; SUBCUTANEOUS at 09:06

## 2022-09-03 RX ADMIN — CARVEDILOL 25 MG: 12.5 TABLET, FILM COATED ORAL at 09:06

## 2022-09-03 RX ADMIN — SODIUM ZIRCONIUM CYCLOSILICATE 10 G: 10 POWDER, FOR SUSPENSION ORAL at 09:08

## 2022-09-03 RX ADMIN — OXYCODONE HYDROCHLORIDE 5 MG: 5 TABLET ORAL at 21:47

## 2022-09-03 RX ADMIN — Medication 10 ML: at 21:50

## 2022-09-03 RX ADMIN — ACETAMINOPHEN 1000 MG: 500 TABLET, FILM COATED ORAL at 16:24

## 2022-09-03 RX ADMIN — AMLODIPINE BESYLATE 10 MG: 10 TABLET ORAL at 21:47

## 2022-09-03 RX ADMIN — OXYCODONE HYDROCHLORIDE 5 MG: 5 TABLET ORAL at 06:04

## 2022-09-03 RX ADMIN — CITALOPRAM HYDROBROMIDE 40 MG: 20 TABLET ORAL at 09:05

## 2022-09-03 RX ADMIN — OXYCODONE 10 MG: 5 TABLET ORAL at 11:25

## 2022-09-03 RX ADMIN — GABAPENTIN 600 MG: 300 CAPSULE ORAL at 09:06

## 2022-09-03 RX ADMIN — BUMETANIDE 4 MG: 1 TABLET ORAL at 09:05

## 2022-09-03 RX ADMIN — CYCLOBENZAPRINE 10 MG: 10 TABLET, FILM COATED ORAL at 16:24

## 2022-09-03 RX ADMIN — INSULIN LISPRO 2 UNITS: 100 INJECTION, SOLUTION INTRAVENOUS; SUBCUTANEOUS at 11:28

## 2022-09-03 RX ADMIN — PANTOPRAZOLE SODIUM 40 MG: 40 TABLET, DELAYED RELEASE ORAL at 09:05

## 2022-09-03 RX ADMIN — CETIRIZINE HYDROCHLORIDE 5 MG: 10 TABLET, FILM COATED ORAL at 09:06

## 2022-09-03 RX ADMIN — ACETAMINOPHEN 1000 MG: 500 TABLET, FILM COATED ORAL at 09:05

## 2022-09-03 RX ADMIN — BUMETANIDE 4 MG: 1 TABLET ORAL at 21:46

## 2022-09-03 RX ADMIN — HEPARIN SODIUM 5000 UNITS: 5000 INJECTION INTRAVENOUS; SUBCUTANEOUS at 15:07

## 2022-09-03 RX ADMIN — INSULIN DETEMIR 10 UNITS: 100 INJECTION, SOLUTION SUBCUTANEOUS at 21:44

## 2022-09-03 RX ADMIN — GABAPENTIN 600 MG: 300 CAPSULE ORAL at 21:46

## 2022-09-03 RX ADMIN — INSULIN DETEMIR 20 UNITS: 100 INJECTION, SOLUTION SUBCUTANEOUS at 09:00

## 2022-09-03 RX ADMIN — CARVEDILOL 25 MG: 12.5 TABLET, FILM COATED ORAL at 17:15

## 2022-09-03 RX ADMIN — HEPARIN SODIUM 5000 UNITS: 5000 INJECTION INTRAVENOUS; SUBCUTANEOUS at 05:40

## 2022-09-03 RX ADMIN — INSULIN LISPRO 3 UNITS: 100 INJECTION, SOLUTION INTRAVENOUS; SUBCUTANEOUS at 17:19

## 2022-09-03 RX ADMIN — HYDROMORPHONE HYDROCHLORIDE 0.5 MG: 1 INJECTION, SOLUTION INTRAMUSCULAR; INTRAVENOUS; SUBCUTANEOUS at 16:33

## 2022-09-03 RX ADMIN — CYCLOBENZAPRINE 10 MG: 10 TABLET, FILM COATED ORAL at 21:47

## 2022-09-03 RX ADMIN — INSULIN LISPRO 3 UNITS: 100 INJECTION, SOLUTION INTRAVENOUS; SUBCUTANEOUS at 11:28

## 2022-09-03 NOTE — PLAN OF CARE
Goal Outcome Evaluation:           Progress: improving  Outcome Evaluation: Pt was able to tolerate exercises well in seated position. She was also able to demonstrate improvement with ambulation as she navigated 300' w/ CGA and w/o AD use. No LOB noted but pt required cuing to remain upright throughout session. Continue to recommend IRF at time of d.c.

## 2022-09-03 NOTE — PROGRESS NOTES
Referring Provider: Joan Chavarria MD     Orthopedic Shoulder Surgery Note - Obey Santiago MD    Chief Complaint: Status post right total shoulder arthroplasty.  Date of surgery 8/31/2022      Subjective:  AVANI overnight.    She is awaiting transfer to [facility] on Monday 9/5 by report from .  She will continue with hemodialysis      PHYSICAL THERAPY PROGRESS  Outcome Evaluation: Pt demo'd improved transfer and gait tolerance today. Pt continues to be unsteady on feet and requires LUE support and mod VC for safety while ambulating. Pain managed well during session and not a limiting factory. OT present at end of session. Continue to rec IPR at this time. (09/02/22 0968)     Medications/Allergies:    Current Facility-Administered Medications:   •  acetaminophen (TYLENOL) tablet 1,000 mg, 1,000 mg, Oral, Q8H, Luciana Harvey APRN, 1,000 mg at 09/03/22 0905  •  amLODIPine (NORVASC) tablet 10 mg, 10 mg, Oral, Nightly, Obey Santiago MD, 10 mg at 09/01/22 2108  •  bumetanide (BUMEX) tablet 4 mg, 4 mg, Oral, BID, Obey Santiago MD, 4 mg at 09/03/22 0905  •  carvedilol (COREG) tablet 25 mg, 25 mg, Oral, BID With Meals, Obey Santiago MD, 25 mg at 09/03/22 0906  •  cetirizine (zyrTEC) tablet 5 mg, 5 mg, Oral, Daily, Obey Santiago MD, 5 mg at 09/03/22 0906  •  citalopram (CeleXA) tablet 40 mg, 40 mg, Oral, Daily, Obey Santiago MD, 40 mg at 09/03/22 0905  •  cyclobenzaprine (FLEXERIL) tablet 10 mg, 10 mg, Oral, TID, Obey Santiago MD, 10 mg at 09/03/22 0906  •  dextrose (D50W) (25 g/50 mL) IV injection 25 g, 25 g, Intravenous, Q15 Min PRN, Obey Santiago MD, 25 g at 09/02/22 0521  •  dextrose (GLUTOSE) oral gel 15 g, 15 g, Oral, Q15 Min PRN, Obey Santiago MD  •  diphenhydrAMINE (BENADRYL) capsule 25 mg, 25 mg, Oral, Q6H PRN **OR** diphenhydrAMINE (BENADRYL) injection 25 mg, 25 mg, Intravenous, Q6H PRN, Obey Santiago MD  •  gabapentin  (NEURONTIN) capsule 600 mg, 600 mg, Oral, Q12H, Obey Santiago MD, 600 mg at 09/03/22 0906  •  glucagon (human recombinant) (GLUCAGEN DIAGNOSTIC) injection 1 mg, 1 mg, Intramuscular, Q15 Min PRN, Obey Santiago MD  •  heparin (porcine) 5000 UNIT/ML injection 5,000 Units, 5,000 Units, Subcutaneous, Q8H, Obey Santiago MD, 5,000 Units at 09/03/22 0540  •  heparin (porcine) injection 1,000 Units, 1,000 Units, Intracatheter, PRN, Obey Santiago MD, 1,000 Units at 09/02/22 1239  •  heparin injection 300 Units, 3 mL, Intravenous, Daily PRN, Obey Santiago MD  •  HYDROmorphone (DILAUDID) injection 0.5 mg, 0.5 mg, Intravenous, Q2H PRN, 0.5 mg at 09/03/22 0800 **AND** naloxone (NARCAN) injection 0.1 mg, 0.1 mg, Intravenous, Q5 Min PRN, Obey Santiago MD  •  insulin detemir (LEVEMIR) injection 10 Units, 10 Units, Subcutaneous, Nightly, Luciana Harvey, APRN, 10 Units at 09/02/22 2123  •  insulin detemir (LEVEMIR) injection 20 Units, 20 Units, Subcutaneous, QAM, Luciana Harevy, APRN, 20 Units at 09/02/22 0805  •  Insulin Lispro (humaLOG) injection 0-9 Units, 0-9 Units, Subcutaneous, TID AC, Obey Santiago MD, 2 Units at 09/03/22 1128  •  Insulin Lispro (humaLOG) injection 3 Units, 3 Units, Subcutaneous, TID With Meals, Joan Chavarria MD, 3 Units at 09/03/22 1128  •  melatonin tablet 10 mg, 10 mg, Oral, Nightly PRN, Obey Santiago MD, 10 mg at 09/02/22 2122  •  mupirocin (BACTROBAN) 2 % nasal ointment 1 application, 1 application, Nasal, Q12H, Obey Santiago MD, 1 application at 09/02/22 1715  •  ondansetron (ZOFRAN) tablet 4 mg, 4 mg, Oral, Q6H PRN **OR** ondansetron (ZOFRAN) injection 4 mg, 4 mg, Intravenous, Q6H PRN, Obey Santiago MD, 4 mg at 08/31/22 1513  •  oxyCODONE (ROXICODONE) immediate release tablet 5 mg, 5 mg, Oral, Q4H PRN, 5 mg at 09/03/22 0604 **OR** oxyCODONE (ROXICODONE) immediate release tablet 10 mg, 10 mg, Oral, Q4H PRN,  Joan Chavarria MD, 10 mg at 09/03/22 1125  •  pantoprazole (PROTONIX) EC tablet 40 mg, 40 mg, Oral, QAM, Obey Santiago MD, 40 mg at 09/03/22 0905  •  sennosides-docusate (PERICOLACE) 8.6-50 MG per tablet 2 tablet, 2 tablet, Oral, BID PRN, Obey Santiago MD  •  sodium chloride 0.9 % flush 10 mL, 10 mL, Intravenous, PRN, Obey Santiago MD  •  sodium chloride 0.9 % flush 10 mL, 10 mL, Intravenous, Q12H, Obey Santiago MD, 10 mL at 09/01/22 2112  •  sodium chloride 0.9 % flush 20 mL, 20 mL, Intravenous, PRN, Obey Santiago MD  •  sodium zirconium cyclosilicate (LOKELMA) pack 10 g, 10 g, Oral, Daily, Obey Santiago MD, 10 g at 09/03/22 0908  Allergies   Allergen Reactions   • Erythromycin Nausea Only and Other (See Comments)     nausea  upset stomach   • Erythromycin Base Other (See Comments)     nausea         Objective:  Vital Signs   Temp:  [98.7 °F (37.1 °C)-99.6 °F (37.6 °C)] 99.6 °F (37.6 °C)  Heart Rate:  [] 83  Resp:  [16-18] 16  BP: ()/(64-88) 163/82    Results Review:   I reviewed the patient's new clinical results.  Results from last 7 days   Lab Units 09/01/22  0516 08/30/22  1413   WBC 10*3/mm3  --  5.65   HEMOGLOBIN g/dL 11.5* 11.7*   HEMATOCRIT % 36.1 35.8   PLATELETS 10*3/mm3  --  178     Results from last 7 days   Lab Units 09/02/22  0534   SODIUM mmol/L 132*   POTASSIUM mmol/L 4.3   CHLORIDE mmol/L 96*   CO2 mmol/L 22.0   BUN mg/dL 26*   CREATININE mg/dL 5.98*   GLUCOSE mg/dL 158*   CALCIUM mg/dL 8.7         Results from last 7 days   Lab Units 09/02/22  0534   SODIUM mmol/L 132*   POTASSIUM mmol/L 4.3   CHLORIDE mmol/L 96*   CO2 mmol/L 22.0   BUN mg/dL 26*   CREATININE mg/dL 5.98*   CALCIUM mg/dL 8.7   GLUCOSE mg/dL 158*           Physical Exam:  General: comfortable, stable exam this a.m.  Vascular: 2+ radial pulse; digits are pink and well perfused  Operative Shoulder Exam: sling is in place  Neurologic: SLT intact distally well perfused    Dermatologic: surgical dressing is well appearing - clean, dry, and intact; no obvious signs or symptoms of infection or DVT        Primary localized osteoarthrosis of right shoulder region    Contracture of joint of right shoulder region    Biceps tendinitis of right upper extremity    Right shoulder pain    ESRD (end stage renal disease) ([facility])    HTN (hypertension)    DM2 (diabetes mellitus, type 2) ([facility])    Acute postoperative pain        Assessment/Plan:    Disposition: Patient awaiting transfer to [facility] on 9/5/2022 by report.  She will continue with hemodialysis     date of surgery 8/31/2022  Right anatomic total shoulder arthroplasty  POD #: 3  Pain control - well controlled  Drain - N/A  Dressing - clean and can be removed by patient on POD#3 at home.  Okay for RN to replace prior to discharge if dressing is saturated  Sling - in place  Activity - no weight bearing on affected side  PT/OT - sling teaching, non-weight bearing, elbow/wrist/hand exercises only, pendulums for showering on POD #3 at home and for dressing  DVT Prophylaxis - SCDs as inpatient, subcutaneous heparin as an inpatient and okay to resume her home aspirin and this can serve as her outpatient DVT prophylaxis  Antibiotics - completed postoperative antibiotic course  Diet - continue diet  Anesthesia - single shot block was completed preoperatively    Future Appointments   Date Time Provider Department Center   9/13/2022  3:30 PM Obey Santiago MD [facility]     I discussed the patient's findings and my recommendations with patient    Please have the schedulers call our office at [phone] if a clinic appointment is needed.    Covering for: Obey Santiago MD, FAAOS  Orthopedic Surgeon  Fellowship-trained Shoulder and Elbow Surgeon  [facility]  Orthopedics and Sports Medicine  [address] [place] [address]. [address]  [place] [address]      Bharath Lorenzo Jr., MD  09/03/22  12:55 EDT

## 2022-09-03 NOTE — THERAPY TREATMENT NOTE
Patient Name: Yashira Gudino  : 1960    MRN: [id number]                              Today's Date: 9/3/2022       Admit Date: 2022    Visit Dx:     ICD-10-CM ICD-9-CM   1. ESRD (end stage renal disease) ([facility])  N18.6 585.6   2. Primary localized osteoarthrosis of right shoulder region  M19.011 715.11   3. Contracture of joint of right shoulder region  M24.511 718.41   4. Biceps tendinitis of right upper extremity  M75.21 726.12   5. Right shoulder pain, unspecified chronicity  M25.511 719.41     Patient Active Problem List   Diagnosis   • Primary localized osteoarthrosis of right shoulder region   • Contracture of joint of right shoulder region   • Biceps tendinitis of right upper extremity   • Right shoulder pain   • ESRD (end stage renal disease) ([facility])   • HTN (hypertension)   • DM2 (diabetes mellitus, type 2) ([facility])   • Acute postoperative pain     Past Medical History:   Diagnosis Date   • Arthritis    • Charcot-Bertha-Tooth disease    • Chronic kidney disease (CKD), stage IV (severe) ([facility])    • Depression    • Diabetes ([facility])    • Dialysis patient ([facility]ly [facility])    • Fibromyalgia    • History of MRSA infection 2020    left finger, left foot   • Hypertension    • Ileostomy in place ([facility])    • Osteoporosis      Past Surgical History:   Procedure Laterality Date   • ANKLE SURGERY     •  SECTION     • COLON RESECTION     • COLONOSCOPY     • COLOSTOMY     • FOOT SURGERY Left     excise Small Toe   • HAND SURGERY Left     Excise IF   • NECK SURGERY     • TONSILLECTOMY     • TOTAL SHOULDER ARTHROPLASTY Right 2022    Procedure: TOTAL SHOULDER ARTHROPLASTY RIGHT;  Surgeon: Obey Santiago MD;  Location: [facility];  Service: Orthopedics;  Laterality: Right;   • TUBAL ABDOMINAL LIGATION        General Information     Row Name 22 1258          Physical Therapy Time and Intention    Document Type therapy note (daily note)  -     Mode of Treatment physical therapy  -HP     Row Name 22 1258           General Information    Patient Profile Reviewed yes  -     Existing Precautions/Restrictions fall;right;shoulder;non-weight bearing;other (see comments)  R shoulder precautions, sling with abduction pillow, L subclavian dialysis catheter  -     Barriers to Rehab medically complex;previous functional deficit  -     Row Name 09/03/22 1258          Cognition    Orientation Status (Cognition) oriented x 4  -     Row Name 09/03/22 1258          Safety Issues, Functional Mobility    Safety Issues Affecting Function (Mobility) awareness of need for assistance;insight into deficits/self-awareness;judgment;problem-solving;safety precaution awareness;safety precautions follow-through/compliance;sequencing abilities  -     Impairments Affecting Function (Mobility) balance;endurance/activity tolerance;pain;strength;range of motion (ROM);postural/trunk control  -     Comment, Safety Issues/Impairments (Mobility) Pt alert and able to follow commands, no LOB noted  -           User Key  (r) = Recorded By, (t) = Taken By, (c) = Cosigned By    Initials Name Provider Type     Kane Palma, PT Physical Therapist               Mobility     Row Name 09/03/22 1259          Bed Mobility    Comment, (Bed Mobility) Pt UI upon arrival  -     Row Name 09/03/22 1259          Bed-Chair Transfer    Bed-Chair Holstein (Transfers) not tested  -     Row Name 09/03/22 1259          Sit-Stand Transfer    Sit-Stand [place]stein (Transfers) contact guard  -     Comment, (Sit-Stand Transfer) Pt able to complete with min VCing on L hand placement to push from chair  -     Row Name 09/03/22 1259          Gait/Stairs (Locomotion)    Holstein Level (Gait) contact guard  -     Distance in Feet (Gait) 300  -HP     Deviations/Abnormal Patterns (Gait) bilateral deviations;douglas decreased;gait speed decreased;base of support, narrow;stride length decreased;steppage  -     Bilateral Gait Deviations forward flexed  posture  -     Stone Level (Stairs) not tested  -     Comment, (Gait/Stairs) Pt was able to ambulate 300' with CGA and no UE support needed. Required VCing to remain upright.  -     Row Name 09/03/22 1259          Mobility    Extremity Weight-bearing Status right upper extremity  -     Right Upper Extremity (Weight-bearing Status) non weight-bearing (NWB)   -           User Key  (r) = Recorded By, (t) = Taken By, (c) = Cosigned By    Initials Name Provider Type     Kane Palma PT Physical Therapist               Obj/Interventions     Row Name 09/03/22 1301          Motor Skills    Therapeutic Exercise hip;knee;ankle  Therex included 10x each seated of: LAQ, marches, heel and toe raises, quad sets, glute sets  -     Row Name 09/03/22 1301          Balance    Balance Assessment sitting static balance;sitting dynamic balance;sit to stand dynamic balance;standing static balance;standing dynamic balance  -     Static Sitting Balance independent  -     Dynamic Sitting Balance independent  -     Position, Sitting Balance unsupported;sitting in chair  -     Sit to Stand Dynamic Balance contact guard  -     Static Standing Balance verbal cues;contact guard  -     Dynamic Standing Balance verbal cues;contact guard  -     Position/Device Used, Standing Balance unsupported  -     Balance Interventions sitting;standing;sit to stand;supported;static;dynamic  -     Comment, Balance No LOB noted, mild VCing required to remain upright  -           User Key  (r) = Recorded By, (t) = Taken By, (c) = Cosigned By    Initials Name Provider Type     Kane Palma PT Physical Therapist               Goals/Plan    No documentation.                Clinical Impression     Row Name 09/03/22 1303          Pain    Pretreatment Pain Rating 8/10  -     Posttreatment Pain Rating 8/10  -     Pain Location - Side/Orientation Right  -     Pain Location generalized  -     Pain Location - shoulder   -HP     Pain Intervention(s) Ambulation/increased activity;Repositioned  -HP     Row Name 09/03/22 1303          Plan of Care Review    Progress improving  -HP     Outcome Evaluation Pt was able to tolerate exercises well in seated position. She was also able to demonstrate improvement with ambulation as she navigated 300' w/ CGA and w/o AD use. No LOB noted but pt required cuing to remain upright throughout session. Continue to recommend IRF at time of d.c.  -     Row Name 09/03/22 1303          Therapy Assessment/Plan (PT)    Rehab Potential (PT) good, to achieve stated therapy goals  -     Criteria for Skilled Interventions Met (PT) yes;meets criteria;skilled treatment is necessary  -     Therapy Frequency (PT) daily  -     Row Name 09/03/22 1303          Vital Signs    Pre Systolic BP Rehab --  VSS  -HP     O2 Delivery Pre Treatment room air  -HP     O2 Delivery Intra Treatment room air  -HP     O2 Delivery Post Treatment room air  -HP     Pre Patient Position Sitting  -HP     Intra Patient Position Standing  -HP     Post Patient Position Sitting  -     Row Name 09/03/22 1303          Positioning and Restraints    Pre-Treatment Position sitting in chair/recliner  -HP     Post Treatment Position chair  -HP     In Chair notified nsg;reclined;encouraged to call for assist;call light within reach;waffle cushion;legs elevated;with brace  -           User Key  (r) = Recorded By, (t) = Taken By, (c) = Cosigned By    Initials Name Provider Type    HP Kane Palma, PT Physical Therapist               Outcome Measures     Row Name 09/03/22 1301          How much help from another person do you currently need...    Turning from your back to your side while in flat bed without using bedrails? 3  -HP     Moving from lying on back to sitting on the side of a flat bed without bedrails? 3  -HP     Moving to and from a bed to a chair (including a wheelchair)? 3  -HP     Standing up from a chair using your arms  (e.g., wheelchair, bedside chair)? 3  -HP     Climbing 3-5 steps with a railing? 3  -HP     To walk in hospital room? 3  -HP     AM-PAC 6 Clicks Score (PT) 18  -HP     Highest level of mobility 6 --> Walked 10 steps or more  -     Row Name 09/03/22 1306          Functional Assessment    Outcome Measure Options AM-PAC 6 Clicks Basic Mobility (PT)  -           User Key  (r) = Recorded By, (t) = Taken By, (c) = Cosigned By    Initials Name Provider Type     Kane Palma, PT Physical Therapist                             Physical Therapy Education                 Title: PT OT SLP Therapies (Done)     Topic: Physical Therapy (Done)     Point: Mobility training (Done)     Learning Progress Summary           Patient Acceptance, E, VU by  at 9/3/2022 1306    Acceptance, E,TB, VU by  at 9/1/2022 1603                   Point: Home exercise program (Done)     Learning Progress Summary           Patient Acceptance, E, VU by  at 9/3/2022 1306    Acceptance, E,TB, VU by ES at 9/1/2022 1603                   Point: Body mechanics (Done)     Learning Progress Summary           Patient Acceptance, E, VU by  at 9/3/2022 1306    Acceptance, E,TB, VU by ES at 9/1/2022 1603                   Point: Precautions (Done)     Learning Progress Summary           Patient Acceptance, E, VU by  at 9/3/2022 1306    Acceptance, E,TB, VU by  at 9/1/2022 1603                               User Key     Initials Effective Dates Name Provider Type Discipline     06/02/21 -  Kane Palma, PT Physical Therapist PT    ES 08/11/22 -  Taniya Garces, NAILA Physical Therapist PT              PT Recommendation and Plan     Progress: improving  Outcome Evaluation: Pt was able to tolerate exercises well in seated position. She was also able to demonstrate improvement with ambulation as she navigated 300' w/ CGA and w/o AD use. No LOB noted but pt required cuing to remain upright throughout session. Continue to recommend IRF at time of  nathalie     Time Calculation:    PT Charges     Row Name 09/03/22 1130             Time Calculation    Start Time 1130  -HP      PT Received On 09/03/22  -HP      PT Goal Re-Cert Due Date 09/11/22  -HP              Timed Charges    67645 - PT Therapeutic Exercise Minutes 15  -HP      05922 - Gait Training Minutes  10  -HP              Total Minutes    Timed Charges Total Minutes 25  -HP       Total Minutes 25  -HP            User Key  (r) = Recorded By, (t) = Taken By, (c) = Cosigned By    Initials Name Provider Type     Kane Palma, PT Physical Therapist              Therapy Charges for Today     Code Description Service Date Service Provider Modifiers Qty    98830366731 HC PT THER PROC EA 15 MIN 9/3/2022 Kane Palma, PT GP 1    05965192395 HC GAIT TRAINING EA 15 MIN 9/3/2022 Kane Palma, PT GP 1          PT G-Codes  Outcome Measure Options: AM-PAC 6 Clicks Basic Mobility (PT)  AM-PAC 6 Clicks Score (PT): 18  AM-PAC 6 Clicks Score (OT): 14    Kane Palma PT  9/3/2022

## 2022-09-03 NOTE — PROGRESS NOTES
"   LOS: 1 day    Patient Care Team:  Karen Noguera MD as PCP - General (Family Medicine)    Reason For Visit:  F/U ESRD  Subjective           Review of Systems:    Pulm: No soa   CV:  No CP      Objective     acetaminophen, 1,000 mg, Oral, Q8H  amLODIPine, 10 mg, Oral, Nightly  bumetanide, 4 mg, Oral, BID  carvedilol, 25 mg, Oral, BID With Meals  cetirizine, 5 mg, Oral, Daily  citalopram, 40 mg, Oral, Daily  cyclobenzaprine, 10 mg, Oral, TID  gabapentin, 600 mg, Oral, Q12H  heparin (porcine), 5,000 Units, Subcutaneous, Q8H  insulin detemir, 10 Units, Subcutaneous, Nightly  insulin detemir, 20 Units, Subcutaneous, QAM  insulin lispro, 0-9 Units, Subcutaneous, TID AC  Insulin Lispro, 3 Units, Subcutaneous, TID With Meals  mupirocin, 1 application, Nasal, Q12H  pantoprazole, 40 mg, Oral, QAM  sodium chloride, 10 mL, Intravenous, Q12H  sodium zirconium cyclosilicate, 10 g, Oral, Daily             Vital Signs:  Blood pressure 163/82, pulse 83, temperature 99.6 °F (37.6 °C), temperature source Oral, resp. rate 16, height 157.5 cm (62\"), weight 67.8 kg (149 lb 7.6 oz), SpO2 98 %.    Flowsheet Rows    Flowsheet Row First Filed Value   Admission Height 157.5 cm (62\") Documented at 08/30/2022 1512   Admission Weight 67.8 kg (149 lb 7.6 oz) Documented at 08/30/2022 1512          09/02 0701 - 09/03 0700  In: 600 [P.O.:600]  Out: 3520     Physical Exam:    General Appearance: NAD, alert and cooperative, Ox3. THD CATH  Eyes: PER, conjunctivae and sclerae normal, no icterus  Lungs: respirations regular and unlabored, no crepitus, clear to auscultation  Heart/CV: regular rhythm & normal rate, no murmur, no gallop, no rub and no edema  Abdomen: not distended, soft, non-tender, no masses,  bowel sounds present  Skin: No rash, Warm and dry    Radiology:      Renal Imaging:        Labs:  Results from last 7 days   Lab Units 09/01/22  0516 08/30/22  1413   WBC 10*3/mm3  --  5.65   HEMOGLOBIN g/dL 11.5* 11.7*   HEMATOCRIT % 36.1 " 35.8   PLATELETS 10*3/mm3  --  178     Results from last 7 days   Lab Units 09/02/22  0534 09/01/22  0940 09/01/22  0620 08/31/22  0647 08/30/22  1413   SODIUM mmol/L 132*  --  120* 129* 129*   POTASSIUM mmol/L 4.3 3.5 8.2* 5.7* 5.6*   CHLORIDE mmol/L 96*  --  89* 96* 93*   CO2 mmol/L 22.0  --  18.0* 22.0 24.0   BUN mg/dL 26*  --  55* 34* 43*   CREATININE mg/dL 5.98*  --  8.34* 7.35* 8.68*   CALCIUM mg/dL 8.7  --  8.5* 8.5* 8.6   PHOSPHORUS mg/dL 5.9*  --   --   --  7.3*   ALBUMIN g/dL 4.00  --   --   --  3.90     Results from last 7 days   Lab Units 09/02/22  0534   GLUCOSE mg/dL 158*                       Estimated Creatinine Clearance: 9 mL/min (A) (by C-G formula based on SCr of 5.98 mg/dL (H)).      Assessment       Primary localized osteoarthrosis of right shoulder region    Contracture of joint of right shoulder region    Biceps tendinitis of right upper extremity    Right shoulder pain    ESRD (end stage renal disease) ([facility])    HTN (hypertension)    DM2 (diabetes mellitus, type 2) ([facility])    Acute postoperative pain            Impression: ESRD.ANEMIA. HYPERKALEMIA RESOLVED.          Recommendations: LAB AM. NEXT HD 9/5/22.      Nelson Shelby MD  09/03/22  11:39 EDT

## 2022-09-03 NOTE — PROGRESS NOTES
"IM progress note      Yashira Gudino  [id number]  1960     LOS: 1 day     Attending: Joan Chavarria MD    Primary Care Provider: Karen Noguera MD      Chief Complaint/Reason for visit: Right shoulder pain    Subjective   Doing well. Fair pain control. No f/c/n/vom/sob.     Objective        Visit Vitals  /82 (BP Location: Left arm, Patient Position: Lying)   Pulse 83   Temp 99.6 °F (37.6 °C) (Oral)   Resp 16   Ht 157.5 cm (62\")   Wt 67.8 kg (149 lb 7.6 oz)   SpO2 98%   BMI 27.34 kg/m²     Temp (24hrs), Av.1 °F (37.3 °C), Min:98.7 °F (37.1 °C), Max:99.6 °F (37.6 °C)      Nutrition: PO    Respiratory: RA    Occupational Therapy: pending    Physical Exam:     General Appearance:    Alert, cooperative, in no acute distress   Head:    Normocephalic, without obvious abnormality, atraumatic    Lungs:     Normal effort, symmetric chest rise, no crepitus, clear to      auscultation bilaterally             Heart:    Regular rhythm and normal rate, normal S1 and S2   Abdomen:     Soft and benign with pink ileostomy and output in stoma bag   Extremities:  RUE sling.  Surgical dressing CDI.  Good movement and cap refill right digits   Pulses:   Pulses palpable and equal bilaterally   Skin:   No bleeding, bruising or rash   Neurologic:    Cranial nerves 2 - 12 grossly intact     Results Review:     I reviewed the patient's new clinical results.   Results from last 7 days   Lab Units 22  0516 22  1413   WBC 10*3/mm3  --  5.65   HEMOGLOBIN g/dL 11.5* 11.7*   HEMATOCRIT % 36.1 35.8   PLATELETS 10*3/mm3  --  178     Results from last 7 days   Lab Units 22  0534 22  0940 22  0620 22  0647   SODIUM mmol/L 132*  --  120* 129*   POTASSIUM mmol/L 4.3 3.5 8.2* 5.7*   CHLORIDE mmol/L 96*  --  89* 96*   CO2 mmol/L 22.0  --  18.0* 22.0   BUN mg/dL 26*  --  55* 34*   CREATININE mg/dL 5.98*  --  8.34* 7.35*   CALCIUM mg/dL 8.7  --  8.5* 8.5*   GLUCOSE mg/dL 158*  --  544* 174* "     I reviewed the patient's new imaging including images and reports.   Latest Reference Range & Units 09/02/22 16:39 09/02/22 19:36 09/03/22 02:03 09/03/22 07:53   Glucose 70 - 130 mg/dL 223 (H) 120 193 (H) 145 (H)   (H): Data is abnormally high  All medications reviewed.   acetaminophen, 1,000 mg, Oral, Q8H  amLODIPine, 10 mg, Oral, Nightly  bumetanide, 4 mg, Oral, BID  carvedilol, 25 mg, Oral, BID With Meals  cetirizine, 5 mg, Oral, Daily  citalopram, 40 mg, Oral, Daily  cyclobenzaprine, 10 mg, Oral, TID  gabapentin, 600 mg, Oral, Q12H  heparin (porcine), 5,000 Units, Subcutaneous, Q8H  insulin detemir, 10 Units, Subcutaneous, Nightly  insulin detemir, 20 Units, Subcutaneous, QAM  insulin lispro, 0-9 Units, Subcutaneous, TID AC  Insulin Lispro, 3 Units, Subcutaneous, TID With Meals  mupirocin, 1 application, Nasal, Q12H  pantoprazole, 40 mg, Oral, QAM  sodium chloride, 10 mL, Intravenous, Q12H  sodium zirconium cyclosilicate, 10 g, Oral, Daily        Assessment & Plan    Hyperkalemia, being managed with dialysis    Primary localized osteoarthrosis of right shoulder region    Status post right total shoulder arthroplasty    Contracture of joint of right shoulder region    Biceps tendinitis of right upper extremity    Right shoulder pain    ESRD (end stage renal disease) (HCC)    HTN (hypertension)    DM2 (diabetes mellitus, type 2), am hypoglycmia    Acute postoperative pain      Plan  1. PT/OT- RUE sling  2. Pain control-prns. Added multimodals   3. IS-encouraged  4. DVT proph- mechs/heparin  5. Bowel regimen  6. Monitor post-op labs  7. DC planning for [place] , Monday after HD.    - Hypertension:  Resume home medications as appropriate, formulary substitution when indicated.  Holding parameters.  Prn medications for elevated blood pressure.     -DM type 2, brittle.  Hgb A1C 8  FSBG AC/HS, (q 6 when NPO), along with correction humalog.  Long acting insulin.  Prandial Humalog 3 units added as well  Added 3 am  FSBG.    -ESRD  - nephrology following for HD needs.  -  Following potassium      Joan Chavarria MD  09/03/22  09:39 EDT

## 2022-09-04 LAB
ALBUMIN SERPL-MCNC: 3.2 G/DL (ref 3.5–5.2)
ANION GAP SERPL CALCULATED.3IONS-SCNC: 12 MMOL/L (ref 5–15)
BUN SERPL-MCNC: 42 MG/DL (ref 8–23)
BUN/CREAT SERPL: 5.9 (ref 7–25)
CALCIUM SPEC-SCNC: 8 MG/DL (ref 8.6–10.5)
CHLORIDE SERPL-SCNC: 95 MMOL/L (ref 98–107)
CO2 SERPL-SCNC: 20 MMOL/L (ref 22–29)
CREAT SERPL-MCNC: 7.15 MG/DL (ref 0.57–1)
EGFRCR SERPLBLD CKD-EPI 2021: 6.1 ML/MIN/1.73
GLUCOSE BLDC GLUCOMTR-MCNC: 158 MG/DL (ref 70–130)
GLUCOSE BLDC GLUCOMTR-MCNC: 185 MG/DL (ref 70–130)
GLUCOSE BLDC GLUCOMTR-MCNC: 43 MG/DL (ref 70–130)
GLUCOSE BLDC GLUCOMTR-MCNC: 45 MG/DL (ref 70–130)
GLUCOSE BLDC GLUCOMTR-MCNC: 82 MG/DL (ref 70–130)
GLUCOSE BLDC GLUCOMTR-MCNC: 86 MG/DL (ref 70–130)
GLUCOSE BLDC GLUCOMTR-MCNC: 87 MG/DL (ref 70–130)
GLUCOSE BLDC GLUCOMTR-MCNC: 96 MG/DL (ref 70–130)
GLUCOSE SERPL-MCNC: 98 MG/DL (ref 65–99)
PHOSPHATE SERPL-MCNC: 8.4 MG/DL (ref 2.5–4.5)
POTASSIUM SERPL-SCNC: 5.7 MMOL/L (ref 3.5–5.2)
SODIUM SERPL-SCNC: 127 MMOL/L (ref 136–145)

## 2022-09-04 PROCEDURE — 80069 RENAL FUNCTION PANEL: CPT | Performed by: INTERNAL MEDICINE

## 2022-09-04 PROCEDURE — 97110 THERAPEUTIC EXERCISES: CPT

## 2022-09-04 PROCEDURE — 25010000002 HYDROMORPHONE PER 4 MG: Performed by: ORTHOPAEDIC SURGERY

## 2022-09-04 PROCEDURE — 63710000001 INSULIN LISPRO (HUMAN) PER 5 UNITS: Performed by: ORTHOPAEDIC SURGERY

## 2022-09-04 PROCEDURE — 63710000001 ONDANSETRON PER 8 MG: Performed by: ORTHOPAEDIC SURGERY

## 2022-09-04 PROCEDURE — 25010000002 HEPARIN (PORCINE) PER 1000 UNITS: Performed by: ORTHOPAEDIC SURGERY

## 2022-09-04 PROCEDURE — 63710000001 INSULIN LISPRO (HUMAN) PER 5 UNITS: Performed by: INTERNAL MEDICINE

## 2022-09-04 PROCEDURE — 63710000001 INSULIN DETEMIR PER 5 UNITS: Performed by: NURSE PRACTITIONER

## 2022-09-04 PROCEDURE — 99024 POSTOP FOLLOW-UP VISIT: CPT | Performed by: ORTHOPAEDIC SURGERY

## 2022-09-04 PROCEDURE — 82962 GLUCOSE BLOOD TEST: CPT

## 2022-09-04 PROCEDURE — 97530 THERAPEUTIC ACTIVITIES: CPT

## 2022-09-04 RX ADMIN — GABAPENTIN 600 MG: 300 CAPSULE ORAL at 21:10

## 2022-09-04 RX ADMIN — SODIUM ZIRCONIUM CYCLOSILICATE 10 G: 10 POWDER, FOR SUSPENSION ORAL at 21:17

## 2022-09-04 RX ADMIN — ACETAMINOPHEN 1000 MG: 500 TABLET, FILM COATED ORAL at 18:07

## 2022-09-04 RX ADMIN — CYCLOBENZAPRINE 10 MG: 10 TABLET, FILM COATED ORAL at 18:07

## 2022-09-04 RX ADMIN — ACETAMINOPHEN 1000 MG: 500 TABLET, FILM COATED ORAL at 01:58

## 2022-09-04 RX ADMIN — BUMETANIDE 4 MG: 1 TABLET ORAL at 21:10

## 2022-09-04 RX ADMIN — HEPARIN SODIUM 5000 UNITS: 5000 INJECTION INTRAVENOUS; SUBCUTANEOUS at 05:46

## 2022-09-04 RX ADMIN — CYCLOBENZAPRINE 10 MG: 10 TABLET, FILM COATED ORAL at 21:10

## 2022-09-04 RX ADMIN — CYCLOBENZAPRINE 10 MG: 10 TABLET, FILM COATED ORAL at 08:05

## 2022-09-04 RX ADMIN — HYDROMORPHONE HYDROCHLORIDE 0.5 MG: 1 INJECTION, SOLUTION INTRAMUSCULAR; INTRAVENOUS; SUBCUTANEOUS at 18:11

## 2022-09-04 RX ADMIN — HEPARIN SODIUM 5000 UNITS: 5000 INJECTION INTRAVENOUS; SUBCUTANEOUS at 14:25

## 2022-09-04 RX ADMIN — CARVEDILOL 25 MG: 12.5 TABLET, FILM COATED ORAL at 08:04

## 2022-09-04 RX ADMIN — HYDROMORPHONE HYDROCHLORIDE 0.5 MG: 1 INJECTION, SOLUTION INTRAMUSCULAR; INTRAVENOUS; SUBCUTANEOUS at 08:58

## 2022-09-04 RX ADMIN — HEPARIN SODIUM 5000 UNITS: 5000 INJECTION INTRAVENOUS; SUBCUTANEOUS at 21:10

## 2022-09-04 RX ADMIN — INSULIN LISPRO 3 UNITS: 100 INJECTION, SOLUTION INTRAVENOUS; SUBCUTANEOUS at 08:05

## 2022-09-04 RX ADMIN — OXYCODONE HYDROCHLORIDE 5 MG: 5 TABLET ORAL at 05:47

## 2022-09-04 RX ADMIN — PANTOPRAZOLE SODIUM 40 MG: 40 TABLET, DELAYED RELEASE ORAL at 08:05

## 2022-09-04 RX ADMIN — AMLODIPINE BESYLATE 10 MG: 10 TABLET ORAL at 21:10

## 2022-09-04 RX ADMIN — CITALOPRAM HYDROBROMIDE 40 MG: 20 TABLET ORAL at 08:04

## 2022-09-04 RX ADMIN — Medication 20 ML: at 21:10

## 2022-09-04 RX ADMIN — CARVEDILOL 25 MG: 12.5 TABLET, FILM COATED ORAL at 18:07

## 2022-09-04 RX ADMIN — OXYCODONE 10 MG: 5 TABLET ORAL at 21:16

## 2022-09-04 RX ADMIN — Medication 10 MG: at 21:17

## 2022-09-04 RX ADMIN — Medication 10 ML: at 08:05

## 2022-09-04 RX ADMIN — GABAPENTIN 600 MG: 300 CAPSULE ORAL at 08:04

## 2022-09-04 RX ADMIN — SODIUM ZIRCONIUM CYCLOSILICATE 10 G: 10 POWDER, FOR SUSPENSION ORAL at 08:05

## 2022-09-04 RX ADMIN — INSULIN LISPRO 2 UNITS: 100 INJECTION, SOLUTION INTRAVENOUS; SUBCUTANEOUS at 12:02

## 2022-09-04 RX ADMIN — BUMETANIDE 4 MG: 1 TABLET ORAL at 08:04

## 2022-09-04 RX ADMIN — CETIRIZINE HYDROCHLORIDE 5 MG: 10 TABLET, FILM COATED ORAL at 08:04

## 2022-09-04 RX ADMIN — HYDROMORPHONE HYDROCHLORIDE 0.5 MG: 1 INJECTION, SOLUTION INTRAMUSCULAR; INTRAVENOUS; SUBCUTANEOUS at 14:26

## 2022-09-04 RX ADMIN — INSULIN LISPRO 3 UNITS: 100 INJECTION, SOLUTION INTRAVENOUS; SUBCUTANEOUS at 12:01

## 2022-09-04 RX ADMIN — ACETAMINOPHEN 1000 MG: 500 TABLET, FILM COATED ORAL at 08:04

## 2022-09-04 RX ADMIN — ONDANSETRON HYDROCHLORIDE 4 MG: 4 TABLET, FILM COATED ORAL at 21:17

## 2022-09-04 RX ADMIN — INSULIN DETEMIR 10 UNITS: 100 INJECTION, SOLUTION SUBCUTANEOUS at 21:18

## 2022-09-04 NOTE — PROGRESS NOTES
"          Orthopaedic Surgery Progress Note      LOS: 2 days   Patient Care Team:  Karen Noguera MD as PCP - General (Family Medicine)    POD 4    Subjective     Interval History:   Patient doing well this morning.  Has had a bowel movement.  Scheduled to have hemodialysis tomorrow and then be transferred to [facility] tomorrow.    Objective     Vital Signs:  Temp (24hrs), Av °F (37.2 °C), Min:98.3 °F (36.8 °C), Max:99.6 °F (37.6 °C)    /87 (BP Location: Left arm, Patient Position: Lying)   Pulse 83   Temp 98.3 °F (36.8 °C) (Oral)   Resp 16   Ht 157.5 cm (62\")   Wt 67.8 kg (149 lb 7.6 oz)   SpO2 95%   BMI 27.34 kg/m²     Labs:  Lab Results (last 24 hours)     Procedure Component Value Units Date/Time    Renal Function Panel [[id number]]  (Abnormal) Collected: 22 0656    Specimen: Blood Updated: 22 08     Glucose 98 mg/dL      BUN 42 mg/dL      Creatinine 7.15 mg/dL      Sodium 127 mmol/L      Potassium 5.7 mmol/L      Comment: Slight hemolysis detected by analyzer. Results may be affected.        Chloride 95 mmol/L      CO2 20.0 mmol/L      Calcium 8.0 mg/dL      Albumin 3.20 g/dL      Phosphorus 8.4 mg/dL      Anion Gap 12.0 mmol/L      BUN/Creatinine Ratio 5.9     eGFR 6.1 mL/min/1.73      Comment: <15 Indicative of kidney failure       Narrative:      GFR Normal >60  Chronic Kidney Disease <60  Kidney Failure <15      POC Glucose Once [[id number]]  (Normal) Collected: 22 075    Specimen: Blood Updated: 22 075     Glucose 96 mg/dL      Comment: Meter: [id number] : [id number] Edison Segura       POC Glucose Once [[id number]]  (Normal) Collected: 22    Specimen: Blood Updated: 22     Glucose 86 mg/dL      Comment: Meter: [id number] : [id number] Kyree Mix       POC Glucose Once [[id number]]  (Normal) Collected: 22    Specimen: Blood Updated: 22     Glucose 87 mg/dL      Comment: Meter: [id number] : [id number] " Kyree Mix       POC Glucose Once [[id number]]  (Normal) Collected: 09/03/22 1726    Specimen: Blood Updated: 09/03/22 1727     Glucose 100 mg/dL      Comment: Meter: [id number] : [id number] Joss Pritchett       POC Glucose Once [[id number]]  (Abnormal) Collected: 09/03/22 1628    Specimen: Blood Updated: 09/03/22 1640     Glucose 51 mg/dL      Comment: Meter: [id number] : [id number] Camilla Kaplan       POC Glucose Once [[id number]]  (Abnormal) Collected: 09/03/22 1124    Specimen: Blood Updated: 09/03/22 1125     Glucose 156 mg/dL      Comment: Meter: [id number] : [id number] Camilla Kaplan             Physical Exam:  Patient's sling is in place  Postop dressing is clean dry and intact  EPL, EDC, ECRB, dorsal interossei, and FPL are intact    Assessment & Plan     POD #: 4 status post anatomic right total shoulder arthroplasty  Pain control - well controlled  Drain - N/A  Dressing - clean and can be removed by patient on POD#3.  Okay for RN to replace prior to discharge if dressing is saturated  Sling - in place  Activity - no weight bearing on affected side  PT/OT - sling teaching, non-weight bearing, elbow/wrist/hand exercises only, pendulums for showering on POD #3 at home and for dressing  DVT Prophylaxis - SCDs as inpatient, subcutaneous heparin as an inpatient and okay to resume her home aspirin and this can serve as her outpatient DVT prophylaxis  Antibiotics - completed postoperative antibiotic course  Diet - continue diet  Anesthesia - single shot block was completed preoperatively    Rodney Gay MD  09/04/22  09:59 EDT

## 2022-09-04 NOTE — PROGRESS NOTES
"   LOS: 2 days    Patient Care Team:  Karen Noguera MD as PCP - General (Family Medicine)    Reason For Visit:  F/U ESRD  Subjective           Review of Systems:    Pulm: No soa   CV:  No CP      Objective     acetaminophen, 1,000 mg, Oral, Q8H  amLODIPine, 10 mg, Oral, Nightly  bumetanide, 4 mg, Oral, BID  carvedilol, 25 mg, Oral, BID With Meals  cetirizine, 5 mg, Oral, Daily  citalopram, 40 mg, Oral, Daily  cyclobenzaprine, 10 mg, Oral, TID  gabapentin, 600 mg, Oral, Q12H  heparin (porcine), 5,000 Units, Subcutaneous, Q8H  insulin detemir, 10 Units, Subcutaneous, Nightly  insulin detemir, 20 Units, Subcutaneous, QAM  insulin lispro, 0-9 Units, Subcutaneous, TID AC  Insulin Lispro, 3 Units, Subcutaneous, TID With Meals  mupirocin, 1 application, Nasal, Q12H  pantoprazole, 40 mg, Oral, QAM  sodium chloride, 10 mL, Intravenous, Q12H  sodium zirconium cyclosilicate, 10 g, Oral, BID             Vital Signs:  Blood pressure 121/55, pulse 75, temperature 98.1 °F (36.7 °C), temperature source Oral, resp. rate 16, height 157.5 cm (62\"), weight 67.8 kg (149 lb 7.6 oz), SpO2 95 %.    Flowsheet Rows    Flowsheet Row First Filed Value   Admission Height 157.5 cm (62\") Documented at 08/30/2022 1512   Admission Weight 67.8 kg (149 lb 7.6 oz) Documented at 08/30/2022 1512          09/03 0701 - 09/04 0700  In: 360 [P.O.:360]  Out: -     Physical Exam:    General Appearance: NAD, alert and cooperative, Ox3. THD CATH  Eyes: PER, conjunctivae and sclerae normal, no icterus  Lungs: respirations regular and unlabored, no crepitus, clear to auscultation  Heart/CV: regular rhythm & normal rate, no murmur, no gallop, no rub and TR DEPENDENT edema  Abdomen: not distended, soft, non-tender, no masses,  bowel sounds present  Skin: No rash, Warm and dry    Radiology:      Renal Imaging:        Labs:  Results from last 7 days   Lab Units 09/01/22  0516 08/30/22  1413   WBC 10*3/mm3  --  5.65   HEMOGLOBIN g/dL 11.5* 11.7*   HEMATOCRIT % " 36.1 35.8   PLATELETS 10*3/mm3  --  178     Results from last 7 days   Lab Units 09/04/22  0656 09/02/22  0534 09/01/22  0940 09/01/22  0620 08/31/22  0647 08/30/22  1413   SODIUM mmol/L 127* 132*  --  120* 129* 129*   POTASSIUM mmol/L 5.7* 4.3 3.5 8.2* 5.7* 5.6*   CHLORIDE mmol/L 95* 96*  --  89* 96* 93*   CO2 mmol/L 20.0* 22.0  --  18.0* 22.0 24.0   BUN mg/dL 42* 26*  --  55* 34* 43*   CREATININE mg/dL 7.15* 5.98*  --  8.34* 7.35* 8.68*   CALCIUM mg/dL 8.0* 8.7  --  8.5* 8.5* 8.6   PHOSPHORUS mg/dL 8.4* 5.9*  --   --   --  7.3*   ALBUMIN g/dL 3.20* 4.00  --   --   --  3.90     Results from last 7 days   Lab Units 09/04/22  0656   GLUCOSE mg/dL 98                       Estimated Creatinine Clearance: 7.6 mL/min (A) (by C-G formula based on SCr of 7.15 mg/dL (H)).      Assessment       Primary localized osteoarthrosis of right shoulder region    Contracture of joint of right shoulder region    Biceps tendinitis of right upper extremity    Right shoulder pain    ESRD (end stage renal disease) ([facility])    HTN (hypertension)    DM2 (diabetes mellitus, type 2) ([facility])    Acute postoperative pain            Impression: ESRD. MILD ANEMIA. HYPERKALEMIA. HYPONATREMIA      Recommendations: DAILY [facility]. NEXT HD 9/5/22.      Nelson Shelby MD  09/04/22  13:47 EDT

## 2022-09-04 NOTE — PLAN OF CARE
Goal Outcome Evaluation:  Plan of Care Reviewed With: patient           Outcome Evaluation: Pt cont to require CCA for safety with amb in halls. Pt would benefit from cont. PT services.

## 2022-09-04 NOTE — PLAN OF CARE
Problem: Adult Inpatient Plan of Care  Goal: Plan of Care Review  Recent Flowsheet Documentation  Taken 9/4/2022 1335 by [place], PT  Plan of Care Reviewed With: patient  Outcome Evaluation: Pt cont to require CCA for safety with amb in halls. Pt would benefit from cont. PT services.

## 2022-09-04 NOTE — THERAPY TREATMENT NOTE
Patient Name: Yashira Gudino  : 1960    MRN: [id number]                              Today's Date: 2022       Admit Date: 2022    Visit Dx:     ICD-10-CM ICD-9-CM   1. ESRD (end stage renal disease) ([facility])  N18.6 585.6   2. Primary localized osteoarthrosis of right shoulder region  M19.011 715.11   3. Contracture of joint of right shoulder region  M24.511 718.41   4. Biceps tendinitis of right upper extremity  M75.21 726.12   5. Right shoulder pain, unspecified chronicity  M25.511 719.41     Patient Active Problem List   Diagnosis   • Primary localized osteoarthrosis of right shoulder region   • Contracture of joint of right shoulder region   • Biceps tendinitis of right upper extremity   • Right shoulder pain   • ESRD (end stage renal disease) ([facility])   • HTN (hypertension)   • DM2 (diabetes mellitus, type 2) ([facility])   • Acute postoperative pain     Past Medical History:   Diagnosis Date   • Arthritis    • Charcot-Bertha-Tooth disease    • Chronic kidney disease (CKD), stage IV (severe) ([facility])    • Depression    • Diabetes ([facility])    • Dialysis patient ([facility])    • Fibromyalgia    • History of MRSA infection 2020    left finger, left foot   • Hypertension    • Ileostomy in place ([facility])    • Osteoporosis      Past Surgical History:   Procedure Laterality Date   • ANKLE SURGERY     •  SECTION     • COLON RESECTION     • COLONOSCOPY     • COLOSTOMY     • FOOT SURGERY Left     excise Small Toe   • HAND SURGERY Left     Excise IF   • NECK SURGERY     • TONSILLECTOMY     • TOTAL SHOULDER ARTHROPLASTY Right 2022    Procedure: TOTAL SHOULDER ARTHROPLASTY RIGHT;  Surgeon: Obey Santiago MD;  [facility];  Service: Orthopedics;  Laterality: Right;   • TUBAL ABDOMINAL LIGATION        General Information     Row Name 22 1335          Physical Therapy Time and Intention    Document Type therapy note (daily note)  -LS     Mode of Treatment physical therapy  -LS     Row Name 22 1335           General Information    Patient Profile Reviewed yes  -LS     Existing Precautions/Restrictions fall;shoulder;non-weight bearing  R shoulder precautions, sling with abduction pillow, L subclavian dialysis catheter  -     Row Name 09/04/22 1335          Cognition    Orientation Status (Cognition) oriented x 4  -LS           User Key  (r) = Recorded By, (t) = Taken By, (c) = Cosigned By    Initials Name Provider Type    [place] Peraza PT Physical Therapist               Mobility     Row Name 09/04/22 1335          Bed Mobility    Supine-Sit [place] (Bed Mobility) standby assist  -LS     Assistive Device (Bed Mobility) bed rails;head of bed elevated  -     Row Name 09/04/22 1335          Transfers    Comment, (Transfers) sit to stand from bed and from commode  -     Row Name 09/04/22 1335          Sit-Stand Transfer    Sit-Stand [place] (Transfers) contact guard  -     Row Name 09/04/22 1335          Gait/Stairs (Locomotion)    [place] Level (Gait) contact guard  -LS     Distance in Feet (Gait) 300  -LS     Deviations/Abnormal Patterns (Gait) gait speed decreased;bilateral deviations;stride length decreased  -LS     Bilateral Gait Deviations forward flexed posture  -LS     Comment, (Gait/Stairs) CGA without UE support in [place]; one standing rest break d/t back discomfort. Pt able to walk SBA in room, at times holding to furniture with LUE.  -     Row Name 09/04/22 1335          Mobility    Extremity Weight-bearing Status right upper extremity  -LS     Right Upper Extremity (Weight-bearing Status) non weight-bearing (NWB)  -           User Key  (r) = Recorded By, (t) = Taken By, (c) = Cosigned By    Initials Name Provider Type    [place] PT Physical Therapist               Obj/Interventions     Row Name 09/04/22 1335          Balance    Balance Interventions standing;sit to stand;supported;weight shifting activity  -LS           User Key  (r) = Recorded By,  (t) = Taken By, (c) = Cosigned By    Initials Name Provider Type    [place], NAILA Physical Therapist               Goals/Plan    No documentation.                Clinical Impression     Row Name 09/04/22 1335          Pain    Pretreatment Pain Rating 8/10  -LS     Posttreatment Pain Rating 8/10  -LS     Pain Location - Side/Orientation Right  -LS     Pain Location - shoulder  -LS     Pain Intervention(s) Repositioned  -LS     Row Name 09/04/22 1335          Plan of Care Review    Plan of Care Reviewed With patient  -LS     Outcome Evaluation Pt cont to require CCA for safety with amb in halls. Pt would benefit from cont. PT services.  -LS           User Key  (r) = Recorded By, (t) = Taken By, (c) = Cosigned By    Initials Name Provider Type    [place], NAILA Physical Therapist               Outcome Measures     Row Name 09/04/22 1335          How much help from another person do you currently need...    Turning from your back to your side while in flat bed without using bedrails? 3  -LS     Moving from lying on back to sitting on the side of a flat bed without bedrails? 3  -LS     Moving to and from a bed to a chair (including a wheelchair)? 3  -LS     Standing up from a chair using your arms (e.g., wheelchair, bedside chair)? 3  -LS     Climbing 3-5 steps with a railing? 3  -LS     To walk in hospital room? 3  -LS     AM-PAC 6 Clicks Score (PT) 18  -LS     Highest level of mobility 6 --> Walked 10 steps or more  -     Row Name 09/04/22 1335 09/04/22 0935       Functional Assessment    Outcome Measure Options AM-PAC 6 Clicks Basic Mobility (PT)  -LS AM-PAC 6 Clicks Daily Activity (OT)  -LC          User Key  (r) = Recorded By, (t) = Taken By, (c) = Cosigned By    Initials Name Provider Type    [place], NAILA Physical Therapist    Gissel Limon OT Occupational Therapist                             Physical Therapy Education                 Title: PT OT SLP Therapies (In  Progress)     Topic: Physical Therapy (Done)     Point: Mobility training (Done)     Learning Progress Summary           Patient Acceptance, E, VU by HP at 9/3/2022 1306    Acceptance, E,TB, VU by ES at 9/1/2022 1603                   Point: Home exercise program (Done)     Learning Progress Summary           Patient Acceptance, E, VU,NR by LS at 9/4/2022 1335    Comment: Benefits of activity    Acceptance, E, VU by HP at 9/3/2022 1306    Acceptance, E,TB, VU by ES at 9/1/2022 1603                   Point: Body mechanics (Done)     Learning Progress Summary           Patient Acceptance, E, VU by HP at 9/3/2022 1306    Acceptance, E,TB, VU by ES at 9/1/2022 1603                   Point: Precautions (Done)     Learning Progress Summary           Patient Acceptance, E, VU by HP at 9/3/2022 1306    Acceptance, E,TB, VU by ES at 9/1/2022 1603                               User Key     Initials Effective Dates Name Provider Type Discipline     06/16/21 -  [place] Saba, PT Physical Therapist PT     06/02/21 -  Kane Palma, PT Physical Therapist PT     08/11/22 -  Taniya Garces, PT Physical Therapist PT              PT Recommendation and Plan     Plan of Care Reviewed With: patient  Outcome Evaluation: Pt cont to require CCA for safety with amb in halls. Pt would benefit from cont. PT services.     Time Calculation:    PT Charges     Row Name 09/04/22 1335             Time Calculation    Start Time 1335  -LS      PT Received On 09/04/22  -      PT Goal Re-Cert Due Date 09/11/22  -              Timed Charges    52716 - PT Therapeutic Activity Minutes 30  -LS              Total Minutes    Timed Charges Total Minutes 30  -LS       Total Minutes 30  -LS            User Key  (r) = Recorded By, (t) = Taken By, (c) = Cosigned By    Initials Name Provider Type     [place], PT Physical Therapist              Therapy Charges for Today     Code Description Service Date Service Provider Modifiers  Qty    [id number]  PT THERAPEUTIC ACT EA 15 MIN 9/4/2022 La S[place], PT GP 2          PT G-Codes  Outcome Measure Options: AM-PAC 6 Clicks Basic Mobility (PT)  AM-PAC 6 Clicks Score (PT): 18  AM-PAC 6 Clicks Score (OT): 14    [place] Saba, PT  9/4/2022

## 2022-09-04 NOTE — PLAN OF CARE
Goal Outcome Evaluation:  Plan of Care Reviewed With: patient        Progress: improving  Outcome Evaluation: Pt. demonstrates bed mobility with CGA. Completes RUE HEP per MD order with VC's to sequence. SUA to prepare tray for self-feeding. Continue to recommend IPR at discharge.

## 2022-09-04 NOTE — THERAPY TREATMENT NOTE
Patient Name: Yashira Gudino  : 1960    MRN: [id number]                              Today's Date: 2022       Admit Date: 2022    Visit Dx:     ICD-10-CM ICD-9-CM   1. ESRD (end stage renal disease) ([facility])  N18.6 585.6   2. Primary localized osteoarthrosis of right shoulder region  M19.011 715.11   3. Contracture of joint of right shoulder region  M24.511 718.41   4. Biceps tendinitis of right upper extremity  M75.21 726.12   5. Right shoulder pain, unspecified chronicity  M25.511 719.41     Patient Active Problem List   Diagnosis   • Primary localized osteoarthrosis of right shoulder region   • Contracture of joint of right shoulder region   • Biceps tendinitis of right upper extremity   • Right shoulder pain   • ESRD (end stage renal disease) ([facility])   • HTN (hypertension)   • DM2 (diabetes mellitus, type 2) ([facility])   • Acute postoperative pain     Past Medical History:   Diagnosis Date   • Arthritis    • Charcot-Bertha-Tooth disease    • Chronic kidney disease (CKD), stage IV (severe) ([facility])    • Depression    • Diabetes ([facility])    • Dialysis patient ([facility])    • Fibromyalgia    • History of MRSA infection 2020    left finger, left foot   • Hypertension    • Ileostomy in place ([facility])    • Osteoporosis      Past Surgical History:   Procedure Laterality Date   • ANKLE SURGERY     •  SECTION     • COLON RESECTION     • COLONOSCOPY     • COLOSTOMY     • FOOT SURGERY Left     excise Small Toe   • HAND SURGERY Left     Excise IF   • NECK SURGERY     • TONSILLECTOMY     • TOTAL SHOULDER ARTHROPLASTY Right 2022    Procedure: TOTAL SHOULDER ARTHROPLASTY RIGHT;  Surgeon: Obey Santiago MD;  Location: [facility];  Service: Orthopedics;  Laterality: Right;   • TUBAL ABDOMINAL LIGATION        General Information     Row Name 22          OT Time and Intention    Document Type therapy note (daily note)  -LC     Mode of Treatment occupational therapy  -LC     Row Name 22           General Information    Patient Profile Reviewed yes  -     Prior Level of Function --  See IE  -     Existing Precautions/Restrictions fall;right;shoulder;non-weight bearing;other (see comments)  R shoulder precautions, sling with abduction pillow, L subclavian dialysis catheter  -     Barriers to Rehab medically complex;previous functional deficit  -     Row Name 09/04/22 0919          Cognition    Orientation Status (Cognition) oriented x 4  -     Row Name 09/04/22 0919          Safety Issues, Functional Mobility    Safety Issues Affecting Function (Mobility) awareness of need for assistance;insight into deficits/self-awareness;judgment;safety precaution awareness;safety precautions follow-through/compliance  -     Impairments Affecting Function (Mobility) balance;endurance/activity tolerance;pain;strength;range of motion (ROM);postural/trunk control  -           User Key  (r) = Recorded By, (t) = Taken By, (c) = Cosigned By    Initials Name Provider Type     Gissel Martin OT Occupational Therapist                 Mobility/ADL's     Row Name 09/04/22 0921          Bed Mobility    Bed Mobility scooting/bridging;supine-sit;sit-supine  -     Scooting/Bridging [place] (Bed Mobility) contact guard  -     Supine-Sit [place] (Bed Mobility) contact guard  -     Sit-Supine [place] (Bed Mobility) contact guard  -     Assistive Device (Bed Mobility) head of bed elevated;bed rails  -     Comment, (Bed Mobility) Demonstrated good safety and sequencing  -     Row Name 09/04/22 0921          Transfers    Comment, (Transfers) Declined  -[facility] Name 09/04/22 0921          Activities of Daily Living    BADL Assessment/Intervention feeding  -     Row Name 09/04/22 0921          Mobility    Extremity Weight-bearing Status right upper extremity  -     Right Upper Extremity (Weight-bearing Status) non weight-bearing (NWB)  -     Row Name 09/04/22 0921          Self-Feeding  Assessment/Training    [place] Level (Feeding) prepare tray/open items;set up  -     Position (Self-Feeding) sitting up in bed  -           User Key  (r) = Recorded By, (t) = Taken By, (c) = Cosigned By    Initials Name Provider Type    Gissel Limon OT Occupational Therapist               Obj/Interventions     Row Name 09/04/22 0931          Elbow/Forearm (Therapeutic Exercise)    Elbow/Forearm (Therapeutic Exercise) AAROM (active assistive range of motion)  -     Elbow/Forearm AAROM (Therapeutic Exercise) right;flexion;extension;supination;pronation;10 repetitions  -     Row Name 09/04/22 0931          Wrist (Therapeutic Exercise)    Wrist (Therapeutic Exercise) AROM (active range of motion)  -     Wrist AROM (Therapeutic Exercise) right;flexion;extension;10 repetitions  -     Row Name 09/04/22 0931          Hand (Therapeutic Exercise)    Hand (Therapeutic Exercise) AROM (active range of motion)  -     Hand AROM/AAROM (Therapeutic Exercise) right;AROM (active range of motion);finger flexion;finger extension;10 repetitions  -     Row Name 09/04/22 0931          Motor Skills    Therapeutic Exercise elbow/forearm;wrist;hand  -     Row Name 09/04/22 0931          Balance    Balance Assessment sitting static balance;sitting dynamic balance  -     Static Sitting Balance supervision  -     Dynamic Sitting Balance standby assist  -     Position, Sitting Balance unsupported;sitting edge of bed  -     Balance Interventions sitting;occupation based/functional task;weight shifting activity  -           User Key  (r) = Recorded By, (t) = Taken By, (c) = Cosigned By    Initials Name Provider Type    Gissel Limon OT Occupational Therapist               Goals/Plan    No documentation.                Clinical Impression     Row Name 09/04/22 0932          Pain Assessment    Pretreatment Pain Rating 7/10  -     Posttreatment Pain Rating 7/10  -     Pain Location - Side/Orientation  Right  -LC     Pain Location generalized  -LC     Pain Location - shoulder  -     Pain Intervention(s) Repositioned;Nursing Notified  -     Additional Documentation Pain Scale: Word Pre/Post-Treatment (Group)  -     Row Name 09/04/22 0932          Plan of Care Review    Plan of Care Reviewed With patient  -LC     Progress improving  -     Outcome Evaluation Pt. demonstrates bed mobility with CGA. Completes RUE HEP per MD order with VC's to sequence. SUA to prepare tray for self-feeding. Continue to recommend IPR at discharge.  -     Row Name 09/04/22 0932          Therapy Plan Review/Discharge Plan (OT)    Anticipated Discharge Disposition (OT) inpatient rehabilitation facility  -     Row Name 09/04/22 0932          Vital Signs    Pre Patient Position Supine  -     Intra Patient Position Sitting  -LC     Post Patient Position Supine  -     Row Name 09/04/22 0932          Positioning and Restraints    Pre-Treatment Position in bed  -LC     Post Treatment Position bed  -LC     In Bed notified nsg;supine;call light within reach;encouraged to call for assist;exit alarm on  -           User Key  (r) = Recorded By, (t) = Taken By, (c) = Cosigned By    Initials Name Provider Type     Gissel Martin, ESTEFANIA Occupational Therapist               Outcome Measures     Row Name 09/04/22 0935          How much help from another is currently needed...    Putting on and taking off regular lower body clothing? 2  -LC     Bathing (including washing, rinsing, and drying) 2  -LC     Toileting (which includes using toilet bed pan or urinal) 2  -LC     Putting on and taking off regular upper body clothing 2  -LC     Taking care of personal grooming (such as brushing teeth) 3  -LC     Eating meals 3  -LC     AM-PAC 6 Clicks Score (OT) 14  -     Row Name 09/04/22 0935          Functional Assessment    Outcome Measure Options AM-PAC 6 Clicks Daily Activity (OT)  -           User Key  (r) = Recorded By, (t) = Taken By,  (c) = Cosigned By    Initials Name Provider Type    Gissel Limon OT Occupational Therapist                Occupational Therapy Education                 Title: PT OT SLP Therapies (In Progress)     Topic: Occupational Therapy (In Progress)     Point: ADL training (In Progress)     Description:   Instruct learner(s) on proper safety adaptation and remediation techniques during self care or transfers.   Instruct in proper use of assistive devices.              Learning Progress Summary           Patient Acceptance, E, NR by  at 9/4/2022 0812    Acceptance, E,D, VU,NR by TB at 9/2/2022 1613    Eager, E,TB,D,H, VU,NR by AR at 9/1/2022 1741   Significant Other Eager, E,TB,D,H, VU,NR by AR at 9/1/2022 1741                   Point: Home exercise program (In Progress)     Description:   Instruct learner(s) on appropriate technique for monitoring, assisting and/or progressing therapeutic exercises/activities.              Learning Progress Summary           Patient Acceptance, E, NR by  at 9/4/2022 0812    Acceptance, E,D, VU,NR by TB at 9/2/2022 1613    Eager, E,TB,D,H, VU,NR by AR at 9/1/2022 1741   Significant Other Eager, E,TB,D,H, VU,NR by AR at 9/1/2022 1741                   Point: Precautions (In Progress)     Description:   Instruct learner(s) on prescribed precautions during self-care and functional transfers.              Learning Progress Summary           Patient Acceptance, E, NR by  at 9/4/2022 0812    Acceptance, E,D, VU,NR by TB at 9/2/2022 1613    Eager, E,TB,D,H, VU,NR by AR at 9/1/2022 1741   Significant Other Eager, E,TB,D,H, VU,NR by AR at 9/1/2022 1741                   Point: Body mechanics (In Progress)     Description:   Instruct learner(s) on proper positioning and spine alignment during self-care, functional mobility activities and/or exercises.              Learning Progress Summary           Patient Acceptance, E, NR by  at 9/4/2022 0812    Eager, E,TB,D,H, VU,NR by AR at 9/1/2022  1741   Significant Other Jorge, E,TB,D,H, VU,NR by AR at 9/1/2022 1741                               User Key     Initials Effective Dates Name Provider Type Discipline    TB 06/16/21 -  Olinda Ge OT Occupational Therapist OT    AR 06/16/21 -  Luciana Purcell OT Occupational Therapist OT    JAIMIE 06/16/21 -  Gissel Martin OT Occupational Therapist OT              OT Recommendation and Plan     Plan of Care Review  Plan of Care Reviewed With: patient  Progress: improving  Outcome Evaluation: Pt. demonstrates bed mobility with CGA. Completes RUE HEP per MD order with VC's to sequence. SUA to prepare tray for self-feeding. Continue to recommend IPR at discharge.     Time Calculation:    Time Calculation- OT     Row Name 09/04/22 0937             Time Calculation- OT    OT Start Time 0812  -      OT Received On 09/04/22  -      OT Goal Re-Cert Due Date 09/11/22  -              Timed Charges    02860 - OT Therapeutic Exercise Minutes 15  -LC              Total Minutes    Timed Charges Total Minutes 15  -LC       Total Minutes 15  -LC            User Key  (r) = Recorded By, (t) = Taken By, (c) = Cosigned By    Initials Name Provider Type    LC Gissel Martin OT Occupational Therapist              Therapy Charges for Today     Code Description Service Date Service Provider Modifiers Qty    54913196730 HC OT THER PROC EA 15 MIN 9/4/2022 Gissel Martin OT GO 1               Gissel Martin OT  9/4/2022

## 2022-09-04 NOTE — PROGRESS NOTES
"IM progress note      Yashira Gudino  [id number]  1960     LOS: 2 days     Attending: Joan Chavarria MD    Primary Care Provider: Karen Noguera MD      Chief Complaint/Reason for visit: Right shoulder pain    Subjective   Doing well. Only c/o incisional pain. No f/c/n/vom. Looking forward to going to rehab after HD tomorrow.     Objective        Visit Vitals  /55 (BP Location: Left arm, Patient Position: Lying)   Pulse 75   Temp 98.1 °F (36.7 °C) (Oral)   Resp 16   Ht 157.5 cm (62\")   Wt 67.8 kg (149 lb 7.6 oz)   SpO2 95%   BMI 27.34 kg/m²     Temp (24hrs), Av.7 °F (37.1 °C), Min:98.1 °F (36.7 °C), Max:99.6 °F (37.6 °C)      Nutrition: PO    Respiratory: RA    Occupational Therapy: pending    Physical Exam:     General Appearance:    Alert, cooperative, in no acute distress   Head:    Normocephalic, without obvious abnormality, atraumatic    Lungs:     Normal effort, symmetric chest rise, no crepitus, clear to      auscultation bilaterally             Heart:    Regular rhythm and normal rate, normal S1 and S2   Abdomen:     Soft and benign with pink ileostomy and output in stoma bag   Extremities:  RUE sling.  Surgical dressing CDI.  Good movement and cap refill right digits   Pulses:   Pulses palpable and equal bilaterally   Skin:   No bleeding, bruising or rash   Neurologic:    Cranial nerves 2 - 12 grossly intact     Results Review:     I reviewed the patient's new clinical results.   Results from last 7 days   Lab Units 22  0516 22  1413   WBC 10*3/mm3  --  5.65   HEMOGLOBIN g/dL 11.5* 11.7*   HEMATOCRIT % 36.1 35.8   PLATELETS 10*3/mm3  --  178     Results from last 7 days   Lab Units 22  0656 22  0534 22  0940 22  0620   SODIUM mmol/L 127* 132*  --  120*   POTASSIUM mmol/L 5.7* 4.3 3.5 8.2*   CHLORIDE mmol/L 95* 96*  --  89*   CO2 mmol/L 20.0* 22.0  --  18.0*   BUN mg/dL 42* 26*  --  55*   CREATININE mg/dL 7.15* 5.98*  --  8.34*   CALCIUM mg/dL " 8.0* 8.7  --  8.5*   GLUCOSE mg/dL 98 158*  --  544*     I reviewed the patient's new imaging including images and reports.   Latest Reference Range & Units 09/02/22 16:39 09/02/22 19:36 09/03/22 02:03 09/03/22 07:53   Glucose 70 - 130 mg/dL 223 (H) 120 193 (H) 145 (H)   (H): Data is abnormally high  All medications reviewed.   acetaminophen, 1,000 mg, Oral, Q8H  amLODIPine, 10 mg, Oral, Nightly  bumetanide, 4 mg, Oral, BID  carvedilol, 25 mg, Oral, BID With Meals  cetirizine, 5 mg, Oral, Daily  citalopram, 40 mg, Oral, Daily  cyclobenzaprine, 10 mg, Oral, TID  gabapentin, 600 mg, Oral, Q12H  heparin (porcine), 5,000 Units, Subcutaneous, Q8H  insulin detemir, 10 Units, Subcutaneous, Nightly  insulin detemir, 20 Units, Subcutaneous, QAM  insulin lispro, 0-9 Units, Subcutaneous, TID AC  Insulin Lispro, 3 Units, Subcutaneous, TID With Meals  mupirocin, 1 application, Nasal, Q12H  pantoprazole, 40 mg, Oral, QAM  sodium chloride, 10 mL, Intravenous, Q12H  sodium zirconium cyclosilicate, 10 g, Oral, BID        Assessment & Plan    Hyperkalemia, being managed with dialysis    Primary localized osteoarthrosis of right shoulder region    Status post right total shoulder arthroplasty    Contracture of joint of right shoulder region    Biceps tendinitis of right upper extremity    Right shoulder pain    ESRD (end stage renal disease) ([facility])    HTN (hypertension)    DM2 (diabetes mellitus, type 2), am hypoglycmia    Acute postoperative pain    Hyponatremia.     Plan  1. PT/OT- RUE sling  2. Pain control-prns. Added multimodals   3. IS-encouraged  4. DVT proph- mechs/heparin  5. Bowel regimen, as needed. Has stoma.  6. Monitor post-op labs  7. DC planning for [place] , Monday after HD.    - Hypertension:  Resume home medications as appropriate, formulary substitution when indicated.  Holding parameters.  Prn medications for elevated blood pressure.     -DM type 2, brittle.  Hgb A1C 8  FSBG AC/HS, (q 6 when NPO), along with  correction humalog.  Long acting insulin.  Prandial Humalog 3 units added as well  Added 3 am FSBG.    -ESRD  - nephrology following for HD needs.  -  Following potassium      Joan Chavarria MD  09/04/22  14:27 EDT

## 2022-09-05 ENCOUNTER — APPOINTMENT (OUTPATIENT)
Dept: NEPHROLOGY | Facility: HOSPITAL | Age: 62
End: 2022-09-05

## 2022-09-05 VITALS
HEIGHT: 62 IN | SYSTOLIC BLOOD PRESSURE: 153 MMHG | RESPIRATION RATE: 16 BRPM | HEART RATE: 72 BPM | DIASTOLIC BLOOD PRESSURE: 79 MMHG | BODY MASS INDEX: 27.51 KG/M2 | OXYGEN SATURATION: 97 % | TEMPERATURE: 98.8 F | WEIGHT: 149.47 LBS

## 2022-09-05 LAB
DEPRECATED RDW RBC AUTO: 66.4 FL (ref 37–54)
ERYTHROCYTE [DISTWIDTH] IN BLOOD BY AUTOMATED COUNT: 17.8 % (ref 12.3–15.4)
GLUCOSE BLDC GLUCOMTR-MCNC: 195 MG/DL (ref 70–130)
GLUCOSE BLDC GLUCOMTR-MCNC: 208 MG/DL (ref 70–130)
GLUCOSE BLDC GLUCOMTR-MCNC: 252 MG/DL (ref 70–130)
GLUCOSE BLDC GLUCOMTR-MCNC: 282 MG/DL (ref 70–130)
HCT VFR BLD AUTO: 31.5 % (ref 34–46.6)
HGB BLD-MCNC: 10.3 G/DL (ref 12–15.9)
MCH RBC QN AUTO: 32.8 PG (ref 26.6–33)
MCHC RBC AUTO-ENTMCNC: 32.7 G/DL (ref 31.5–35.7)
MCV RBC AUTO: 100.3 FL (ref 79–97)
PLATELET # BLD AUTO: 132 10*3/MM3 (ref 140–450)
PMV BLD AUTO: 10.1 FL (ref 6–12)
RBC # BLD AUTO: 3.14 10*6/MM3 (ref 3.77–5.28)
WBC NRBC COR # BLD: 4.78 10*3/MM3 (ref 3.4–10.8)

## 2022-09-05 PROCEDURE — 63710000001 INSULIN DETEMIR PER 5 UNITS: Performed by: NURSE PRACTITIONER

## 2022-09-05 PROCEDURE — 25010000002 HYDROMORPHONE PER 4 MG: Performed by: ORTHOPAEDIC SURGERY

## 2022-09-05 PROCEDURE — 63710000001 INSULIN LISPRO (HUMAN) PER 5 UNITS: Performed by: INTERNAL MEDICINE

## 2022-09-05 PROCEDURE — 63710000001 INSULIN LISPRO (HUMAN) PER 5 UNITS: Performed by: ORTHOPAEDIC SURGERY

## 2022-09-05 PROCEDURE — P9047 ALBUMIN (HUMAN), 25%, 50ML: HCPCS

## 2022-09-05 PROCEDURE — 25010000002 ALBUMIN HUMAN 25% PER 50 ML

## 2022-09-05 PROCEDURE — 85027 COMPLETE CBC AUTOMATED: CPT

## 2022-09-05 PROCEDURE — 82962 GLUCOSE BLOOD TEST: CPT

## 2022-09-05 RX ORDER — HEPARIN SODIUM 1000 [USP'U]/ML
3600 INJECTION, SOLUTION INTRAVENOUS; SUBCUTANEOUS ONCE
Status: DISCONTINUED | OUTPATIENT
Start: 2022-09-05 | End: 2022-09-05 | Stop reason: HOSPADM

## 2022-09-05 RX ORDER — OXYCODONE HYDROCHLORIDE 5 MG/1
5-10 TABLET ORAL EVERY 4 HOURS PRN
Qty: 25 TABLET | Refills: 0 | Status: SHIPPED | OUTPATIENT
Start: 2022-09-05 | End: 2022-09-07 | Stop reason: SDUPTHER

## 2022-09-05 RX ORDER — AMOXICILLIN 250 MG
2 CAPSULE ORAL 2 TIMES DAILY PRN
Start: 2022-09-05 | End: 2022-09-07

## 2022-09-05 RX ORDER — GABAPENTIN 600 MG/1
600 TABLET ORAL NIGHTLY
Qty: 5 TABLET | Refills: 0 | Status: SHIPPED | OUTPATIENT
Start: 2022-09-05 | End: 2022-09-07 | Stop reason: SDUPTHER

## 2022-09-05 RX ORDER — ALBUMIN (HUMAN) 12.5 G/50ML
SOLUTION INTRAVENOUS
Status: DISCONTINUED
Start: 2022-09-05 | End: 2022-09-05 | Stop reason: HOSPADM

## 2022-09-05 RX ORDER — ALBUMIN (HUMAN) 12.5 G/50ML
12.5 SOLUTION INTRAVENOUS AS NEEDED
Status: DISCONTINUED | OUTPATIENT
Start: 2022-09-05 | End: 2022-09-05 | Stop reason: HOSPADM

## 2022-09-05 RX ORDER — ALBUMIN (HUMAN) 12.5 G/50ML
SOLUTION INTRAVENOUS
Status: COMPLETED
Start: 2022-09-05 | End: 2022-09-05

## 2022-09-05 RX ADMIN — INSULIN LISPRO 3 UNITS: 100 INJECTION, SOLUTION INTRAVENOUS; SUBCUTANEOUS at 14:08

## 2022-09-05 RX ADMIN — HYDROMORPHONE HYDROCHLORIDE 0.5 MG: 1 INJECTION, SOLUTION INTRAMUSCULAR; INTRAVENOUS; SUBCUTANEOUS at 00:47

## 2022-09-05 RX ADMIN — INSULIN DETEMIR 20 UNITS: 100 INJECTION, SOLUTION SUBCUTANEOUS at 07:56

## 2022-09-05 RX ADMIN — CITALOPRAM HYDROBROMIDE 40 MG: 20 TABLET ORAL at 13:50

## 2022-09-05 RX ADMIN — Medication 10 ML: at 13:56

## 2022-09-05 RX ADMIN — PANTOPRAZOLE SODIUM 40 MG: 40 TABLET, DELAYED RELEASE ORAL at 07:57

## 2022-09-05 RX ADMIN — OXYCODONE 10 MG: 5 TABLET ORAL at 03:12

## 2022-09-05 RX ADMIN — INSULIN LISPRO 3 UNITS: 100 INJECTION, SOLUTION INTRAVENOUS; SUBCUTANEOUS at 07:58

## 2022-09-05 RX ADMIN — INSULIN LISPRO 6 UNITS: 100 INJECTION, SOLUTION INTRAVENOUS; SUBCUTANEOUS at 07:57

## 2022-09-05 RX ADMIN — ALBUMIN HUMAN 12.5 G: 0.25 SOLUTION INTRAVENOUS at 11:00

## 2022-09-05 RX ADMIN — INSULIN LISPRO 2 UNITS: 100 INJECTION, SOLUTION INTRAVENOUS; SUBCUTANEOUS at 14:06

## 2022-09-05 RX ADMIN — OXYCODONE 10 MG: 5 TABLET ORAL at 07:56

## 2022-09-05 RX ADMIN — CETIRIZINE HYDROCHLORIDE 5 MG: 10 TABLET, FILM COATED ORAL at 13:50

## 2022-09-05 RX ADMIN — OXYCODONE 10 MG: 5 TABLET ORAL at 13:52

## 2022-09-05 RX ADMIN — SODIUM ZIRCONIUM CYCLOSILICATE 10 G: 10 POWDER, FOR SUSPENSION ORAL at 14:07

## 2022-09-05 RX ADMIN — GABAPENTIN 600 MG: 300 CAPSULE ORAL at 14:06

## 2022-09-05 RX ADMIN — ACETAMINOPHEN 1000 MG: 500 TABLET, FILM COATED ORAL at 00:45

## 2022-09-05 RX ADMIN — BUMETANIDE 4 MG: 1 TABLET ORAL at 13:50

## 2022-09-05 NOTE — PROGRESS NOTES
"   LOS: 3 days    Patient Care Team:  Karen Noguera MD as PCP - General (Family Medicine)    Reason For Visit:  F/U ESRD. SEEN ON DIALYSIS  Subjective           Review of Systems:    Pulm: No soa   CV:  No CP      Objective     acetaminophen, 1,000 mg, Oral, Q8H  amLODIPine, 10 mg, Oral, Nightly  bumetanide, 4 mg, Oral, BID  carvedilol, 25 mg, Oral, BID With Meals  cetirizine, 5 mg, Oral, Daily  citalopram, 40 mg, Oral, Daily  cyclobenzaprine, 10 mg, Oral, TID  gabapentin, 600 mg, Oral, Q12H  heparin (porcine), 5,000 Units, Subcutaneous, Q8H  insulin detemir, 10 Units, Subcutaneous, Nightly  insulin detemir, 20 Units, Subcutaneous, QAM  insulin lispro, 0-9 Units, Subcutaneous, TID AC  Insulin Lispro, 3 Units, Subcutaneous, TID With Meals  mupirocin, 1 application, Nasal, Q12H  pantoprazole, 40 mg, Oral, QAM  sodium chloride, 10 mL, Intravenous, Q12H  sodium zirconium cyclosilicate, 10 g, Oral, BID             Vital Signs:  Blood pressure 155/79, pulse 77, temperature 98.5 °F (36.9 °C), temperature source Oral, resp. rate 16, height 157.5 cm (62\"), weight 67.8 kg (149 lb 7.6 oz), SpO2 96 %.    Flowsheet Rows    Flowsheet Row First Filed Value   Admission Height 157.5 cm (62\") Documented at 08/30/2022 1512   Admission Weight 67.8 kg (149 lb 7.6 oz) Documented at 08/30/2022 1512          09/04 0701 - 09/05 0700  In: -   Out: 300     Physical Exam:    General Appearance: NAD, alert and cooperative, Ox3. THD CATH  Eyes: PER, conjunctivae and sclerae normal, no icterus  Lungs: respirations regular and unlabored, no crepitus, clear to auscultation  Heart/CV: regular rhythm & normal rate, no murmur, no gallop, no rub and TR DEPENDENT edema  Abdomen: not distended, soft, non-tender, no masses,  bowel sounds present  Skin: No rash, Warm and dry    Radiology:      Renal Imaging:        Labs:  Results from last 7 days   Lab Units 09/05/22  0638 09/01/22  0516 08/30/22  1413   WBC 10*3/mm3 4.78  --  5.65   HEMOGLOBIN " g/dL 10.3* 11.5* 11.7*   HEMATOCRIT % 31.5* 36.1 35.8   PLATELETS 10*3/mm3 132*  --  178     Results from last 7 days   Lab Units 09/04/22  0656 09/02/22  0534 09/01/22  0940 09/01/22  0620 08/31/22  0647 08/30/22  1413   SODIUM mmol/L 127* 132*  --  120* 129* 129*   POTASSIUM mmol/L 5.7* 4.3 3.5 8.2* 5.7* 5.6*   CHLORIDE mmol/L 95* 96*  --  89* 96* 93*   CO2 mmol/L 20.0* 22.0  --  18.0* 22.0 24.0   BUN mg/dL 42* 26*  --  55* 34* 43*   CREATININE mg/dL 7.15* 5.98*  --  8.34* 7.35* 8.68*   CALCIUM mg/dL 8.0* 8.7  --  8.5* 8.5* 8.6   PHOSPHORUS mg/dL 8.4* 5.9*  --   --   --  7.3*   ALBUMIN g/dL 3.20* 4.00  --   --   --  3.90     Results from last 7 days   Lab Units 09/04/22  0656   GLUCOSE mg/dL 98                       Estimated Creatinine Clearance: 7.6 mL/min (A) (by C-G formula based on SCr of 7.15 mg/dL (H)).      Assessment       Primary localized osteoarthrosis of right shoulder region    Contracture of joint of right shoulder region    Biceps tendinitis of right upper extremity    Right shoulder pain    ESRD (end stage renal disease) ([facility])    HTN (hypertension)    DM2 (diabetes mellitus, type 2) ([facility])    Acute postoperative pain            Impression: ESRD. MILD ANEMIA. HYPERKALEMIA. HYPONATREMIA      Recommendations: DAILY LOKELMA.  HD 9/5/22. AWAIT REHAB.    Nelson Shelby MD  09/05/22  08:28 EDT

## 2022-09-05 NOTE — NURSING NOTE
HD completed per MD order; tolerated well. UF goal met.     Handoff report given to primary RN ATA Floyd.

## 2022-09-05 NOTE — DISCHARGE PLACEMENT REQUEST
"Yashira George (61 y.o. Female)       Ms. George will be returning to [facility] on Wednesday, 9/7/22.  She had HD today at [facility].  Wheelchair transport to [facility] is today at 2pm.  Thank you, Keyana, , ph [phone].                Date of Birth   1960    Social Security Number       Address   [address] [place] [address]    Home Phone   [phone]    MRN   [id number]       Mosque   Oriental orthodox    Marital Status   Legally                             Admission Date   8/30/22    Admission Type   Urgent    Admitting Provider   Obey Santiago MD    Attending Provider   Joan Chavarria MD    Department, Room/Bed   [facility] 3F, S322/1       Discharge Date       Discharge Disposition       Discharge Destination                               Attending Provider: Joan Chavarria MD    Allergies: Erythromycin, Erythromycin Base    Isolation: None   Infection: None   Code Status: CPR   Advance Care Planning Activity    Ht: 157.5 cm (62\")   Wt: 67.8 kg (149 lb 7.6 oz)    Admission Cmt: None   Principal Problem: None                Active Insurance as of 8/30/2022     Primary Coverage     Payor Plan Insurance Group Employer/Plan Group    MEDICARE MEDICARE A & B      Payor Plan Address Payor Plan Phone Number Payor Plan Fax Number Effective Dates    [address] [phone]  7/1/2008 - None Entered    [facility] [address]       Subscriber Name Subscriber Birth Date Member ID       YASHIRA GEORGE 1960 [id number]           Secondary Coverage     Payor Plan Insurance Group Employer/Plan Group     FOR LIFE  FOR LIFE  SUP       Payor Plan Address Payor Plan Phone Number Payor Plan Fax Number Effective Dates    [address] [phone]  1/1/2022 - None Entered    [facility] [phone]       Subscriber Name Subscriber Birth Date Member ID       YASHIRA GEORGE 1960 [id number]                 Emergency Contacts  "     (Rel.) Home Phone Work Phone Mobile Phone    Shahzad Flowers (Significant Other) -- -- [phone]    MAGALY GEORGE (Son) -- -- [phone]               Discharge Summary      Joan Chavarria MD at 22 0925          Patient Name: Yashira George  MRN: [id number]  : 1960  DOS: 2022    Attending: Joan Chavarria MD    Primary Care Provider: Karen Noguera MD    Date of Admission:.2022 11:02 AM    Date of Discharge:  2022    Discharge Diagnosis:   Primary localized osteoarthrosis of right shoulder region    Contracture of joint of right shoulder region    Biceps tendinitis of right upper extremity    Right shoulder pain    ESRD (end stage renal disease) ([facility])    HTN (hypertension)    DM2 (diabetes mellitus, type 2) ([facility])    Acute postoperative pain      Hospital Course    At admit:     Patient is a pleasant  61 y.o.  female who has been followed by  for right shoulder pain, and has opted to proceed with surgery.     Her preop labs showed hyperkalemia, and in light of patient being on hemodialysis 3 times weekly and having dealt with hyperkalemia chronically it was felt best that she be admitted to receive hemodialysis then proceed with surgery.     Patient receives hemodialysis 3 times weekly through a tunneled hemodialysis catheter.  She has been on dialysis for about 2 years according to patient and is under the care of  nephrology.  She has extensive other past medical history including diabetes mellitus and diabetic peripheral neuropathy as well as history of ischemic colitis with an ileostomy.     Upon admit she was seen by Dr. Shelby with nephrology and hemodialysis was initiated.    After admit:    Patient was provided pain medications as needed for pain control, along with interscalene nerve block infusion of Ropivacaine    Adjustments were made to pain medications to optimize postop pain management.     Risks and benefits of opiate medications  discussed with patient. JONATHAN report on chart was reviewed.    The patient was seen by OT and was taught exercises for right arm.  The patient used an IS for atelectasis prophylaxis and mechanicals for DVT prophylaxis.    Her blood glucose was monitored during her stay with a regimen of insulin similar to what she takes at home for the most part.  She was noted to have occasional hypoglycemic episodes .  Per her report her diabetes is quite brittle, could possibly be managed with slight decrease in her long-acting insulin dosing from current regimen, encouraging bedtime snack , and she will need to follow-up with her PCP following discharge from skilled nursing facility.    Home medications were resumed as appropriate, and labs were monitored and remained fairly stable.     She was seen in consultation by nephrology throughout her stay and they managed her hemodialysis along with monitoring her labs and electrolytes.    With the progress she has made, Ms. Gudino is ready for DC to skilled nursing facility today.      Discussed with patient regarding plan and she shows understanding and agreement.        Procedures Performed  Procedure(s):  TOTAL SHOULDER ARTHROPLASTY RIGHT       Pertinent Test Results:    I reviewed the patient's new clinical results.   Results from last 7 days   Lab Units 09/05/22  0638 09/01/22  0516 08/30/22  1413   WBC 10*3/mm3 4.78  --  5.65   HEMOGLOBIN g/dL 10.3* 11.5* 11.7*   HEMATOCRIT % 31.5* 36.1 35.8   PLATELETS 10*3/mm3 132*  --  178     Results from last 7 days   Lab Units 09/04/22  0656 09/02/22  0534 09/01/22  0940 09/01/22  0620   SODIUM mmol/L 127* 132*  --  120*   POTASSIUM mmol/L 5.7* 4.3 3.5 8.2*   CHLORIDE mmol/L 95* 96*  --  89*   CO2 mmol/L 20.0* 22.0  --  18.0*   BUN mg/dL 42* 26*  --  55*   CREATININE mg/dL 7.15* 5.98*  --  8.34*   CALCIUM mg/dL 8.0* 8.7  --  8.5*   GLUCOSE mg/dL 98 158*  --  544*     I reviewed the patient's new imaging including images and  "reports.      Occupational Therapy  Progress: improving  Outcome Evaluation: Pt. demonstrates bed mobility with CGA. Completes RUE HEP per MD order with VC's to sequence. SUA to prepare tray for self-feeding. Continue to recommend IPR at discharge.    Physical therapy  Taken 2022 1335 by La Saba, PT  Plan of Care Reviewed With: patient  Outcome Evaluation: Pt cont to require CCA for safety with amb in halls. Pt would benefit from cont. PT services.         Discharge Assessment:       Visit Vitals  /79 (BP Location: Left arm, Patient Position: Lying)   Pulse 77   Temp 98.5 °F (36.9 °C) (Oral)   Resp 16   Ht 157.5 cm (62\")   Wt 67.8 kg (149 lb 7.6 oz)   SpO2 96%   BMI 27.34 kg/m²     Temp (24hrs), Av °F (36.7 °C), Min:97.5 °F (36.4 °C), Max:98.5 °F (36.9 °C)      General Appearance:    Alert, cooperative, in no acute distress   Lungs:     Clear to auscultation,respirations regular, even and   unlabored    Heart:    Regular rhythm and normal rate, normal S1 and S2    Abdomen:    Soft, NT, ND. Stoma pink output in stoma bag.   Extremities:   RUE in a sling, CDI  dressing over right shoulder incision . Interscalene nerve block cath present.  Distal pulses, cap refill,  intact. Movement and sensation intact distally     Pulses:   Pulses palpable and equal bilaterally   Skin:   No bleeding, bruising or rash        Discharge Disposition:[facility].          Discharge Medications      New Medications      Instructions Start Date   oxyCODONE 5 MG immediate release tablet  Commonly known as: ROXICODONE   5-10 mg, Oral, Every 4 Hours PRN      sennosides-docusate 8.6-50 MG per tablet  Commonly known as: PERICOLACE   2 tablets, Oral, 2 Times Daily PRN         Changes to Medications      Instructions Start Date   gabapentin 600 MG tablet  Commonly known as: NEURONTIN  What changed: Another medication with the same name was removed. Continue taking this medication, and follow the directions you see " here.   600 mg, Oral, Nightly         Continue These Medications      Instructions Start Date   amLODIPine 10 MG tablet  Commonly known as: NORVASC   10 mg, Oral, Nightly      aspirin 81 MG EC tablet   1 tablet, Oral, Daily      bumetanide 2 MG tablet  Commonly known as: BUMEX   4 mg, Oral, 2 Times Daily      carvedilol 25 MG tablet  Commonly known as: COREG   25 mg, Oral, 2 Times Daily With Meals      cetirizine 10 MG tablet  Commonly known as: zyrTEC   10 mg, Oral, Daily      citalopram 40 MG tablet  Commonly known as: CeleXA   40 mg, Oral, Daily      Cranberry 450 MG capsule   450 mg, Oral, Nightly      cyclobenzaprine 10 MG tablet  Commonly known as: FLEXERIL   10 mg, Oral, 3 Times Daily PRN      Diclofenac Sodium 1 % gel gel  Commonly known as: VOLTAREN   1 application, Transdermal, As Needed      Glucosamine-Chondroitin Max St capsule   1 capsule, Oral, Nightly      Lantus SoloStar 100 UNIT/ML injection pen  Generic drug: Insulin Glargine   Inject 20 Units under the skin into the appropriate area as directed. 20 units in the am  10 units in the pm  Per patient      lidocaine 5 %  Commonly known as: LIDODERM   1 patch, Transdermal, Every 24 Hours      Melatonin 10 MG tablet   10 mg, Oral, Nightly      NovoLOG FlexPen 100 UNIT/ML solution pen-injector sc pen  Generic drug: insulin aspart   Sliding scale  3-5 units per meal based on blood sugar  Correction dose as prescribed      omeprazole 40 MG capsule  Commonly known as: priLOSEC   40 mg, Oral, Daily      ondansetron 4 MG tablet  Commonly known as: ZOFRAN   4 mg, Oral      sodium zirconium cyclosilicate 5 g pack  Commonly known as: LOKELMA   5 g, Oral, Daily         Stop These Medications    HYDROcodone-acetaminophen 7.5-325 MG per tablet  Commonly known as: NORCO     mupirocin 2 % ointment  Commonly known as: BACTROBAN            Discharge Diet: low K renal, consistent carb.     Activity at Discharge:   Per 's instructions.    Follow-up  Appointments  Obey Santiago MD.  PCP after discharge from [facility].    Discharge took over 30 min       Joan Chavarria MD  09/05/22  09:25 EDT              Electronically signed by Joan Chavarria MD at 09/05/22 0930

## 2022-09-05 NOTE — DISCHARGE SUMMARY
Patient Name: Yashira Gudino  MRN: [id number]  : 1960  DOS: 2022    Attending: Joan Chavarria MD    Primary Care Provider: Karen Noguera MD    Date of Admission:.2022 11:02 AM    Date of Discharge:  2022    Discharge Diagnosis:   Primary localized osteoarthrosis of right shoulder region    Contracture of joint of right shoulder region    Biceps tendinitis of right upper extremity    Right shoulder pain    ESRD (end stage renal disease) ([facility])    HTN (hypertension)    DM2 (diabetes mellitus, type 2) ([facility])    Acute postoperative pain      Hospital Course    At admit:     Patient is a pleasant 61 y.o. female who has been followed by  for right shoulder pain, and has opted to proceed with surgery.     Her preop labs showed hyperkalemia, and in light of patient being on hemodialysis 3 times weekly and having dealt with hyperkalemia chronically it was felt best that she be admitted to receive hemodialysis then proceed with surgery.     Patient receives hemodialysis 3 times weekly through a tunneled hemodialysis catheter.  She has been on dialysis for about 2 years according to patient and is under the care of  nephrology.  She has extensive other past medical history including diabetes mellitus and diabetic peripheral neuropathy as well as history of ischemic colitis with an ileostomy.     Upon admit she was seen by Dr. Shelby with nephrology and hemodialysis was initiated.    After admit:    Patient was provided pain medications as needed for pain control, along with interscalene nerve block infusion of Ropivacaine    Adjustments were made to pain medications to optimize postop pain management.     Risks and benefits of opiate medications discussed with patient. JONATHAN report on chart was reviewed.    The patient was seen by OT and was taught exercises for right arm.  The patient used an IS for atelectasis prophylaxis and mechanicals for DVT prophylaxis.    Her blood  glucose was monitored during her stay with a regimen of insulin similar to what she takes at home for the most part.  She was noted to have occasional hypoglycemic episodes .  Per her report her diabetes is quite brittle, could possibly be managed with slight decrease in her long-acting insulin dosing from current regimen, encouraging bedtime snack , and she will need to follow-up with her PCP following discharge from skilled nursing facility.    Home medications were resumed as appropriate, and labs were monitored and remained fairly stable.     She was seen in consultation by nephrology throughout her stay and they managed her hemodialysis along with monitoring her labs and electrolytes.    With the progress she has made, Ms. Gudino is ready for DC to skilled nursing facility today.      Discussed with patient regarding plan and she shows understanding and agreement.        Procedures Performed  Procedure(s):  TOTAL SHOULDER ARTHROPLASTY RIGHT       Pertinent Test Results:    I reviewed the patient's new clinical results.   Results from last 7 days   Lab Units 09/05/22  0638 09/01/22  0516 08/30/22  1413   WBC 10*3/mm3 4.78  --  5.65   HEMOGLOBIN g/dL 10.3* 11.5* 11.7*   HEMATOCRIT % 31.5* 36.1 35.8   PLATELETS 10*3/mm3 132*  --  178     Results from last 7 days   Lab Units 09/04/22  0656 09/02/22  0534 09/01/22  0940 09/01/22  0620   SODIUM mmol/L 127* 132*  --  120*   POTASSIUM mmol/L 5.7* 4.3 3.5 8.2*   CHLORIDE mmol/L 95* 96*  --  89*   CO2 mmol/L 20.0* 22.0  --  18.0*   BUN mg/dL 42* 26*  --  55*   CREATININE mg/dL 7.15* 5.98*  --  8.34*   CALCIUM mg/dL 8.0* 8.7  --  8.5*   GLUCOSE mg/dL 98 158*  --  544*     I reviewed the patient's new imaging including images and reports.      Occupational Therapy  Progress: improving  Outcome Evaluation: Pt. demonstrates bed mobility with CGA. Completes RUE HEP per MD order with VC's to sequence. SUA to prepare tray for self-feeding. Continue to recommend IPR at  "discharge.    Physical therapy  Taken 2022 1335 by La Saba, PT  Plan of Care Reviewed With: patient  Outcome Evaluation: Pt cont to require CCA for safety with amb in halls. Pt would benefit from cont. PT services.         Discharge Assessment:       Visit Vitals  /79 (BP Location: Left arm, Patient Position: Lying)   Pulse 77   Temp 98.5 °F (36.9 °C) (Oral)   Resp 16   Ht 157.5 cm (62\")   Wt 67.8 kg (149 lb 7.6 oz)   SpO2 96%   BMI 27.34 kg/m²     Temp (24hrs), Av °F (36.7 °C), Min:97.5 °F (36.4 °C), Max:98.5 °F (36.9 °C)      General Appearance:    Alert, cooperative, in no acute distress   Lungs:     Clear to auscultation,respirations regular, even and   unlabored    Heart:    Regular rhythm and normal rate, normal S1 and S2    Abdomen:    Soft, NT, ND. Stoma pink output in stoma bag.   Extremities:   RUE in a sling, CDI  dressing over right shoulder incision . Interscalene nerve block cath present.  Distal pulses, cap refill,  intact. Movement and sensation intact distally     Pulses:   Pulses palpable and equal bilaterally   Skin:   No bleeding, bruising or rash        Discharge Disposition:[facility].          Discharge Medications      New Medications      Instructions Start Date   oxyCODONE 5 MG immediate release tablet  Commonly known as: ROXICODONE   5-10 mg, Oral, Every 4 Hours PRN      sennosides-docusate 8.6-50 MG per tablet  Commonly known as: PERICOLACE   2 tablets, Oral, 2 Times Daily PRN         Changes to Medications      Instructions Start Date   gabapentin 600 MG tablet  Commonly known as: NEURONTIN  What changed: Another medication with the same name was removed. Continue taking this medication, and follow the directions you see here.   600 mg, Oral, Nightly         Continue These Medications      Instructions Start Date   amLODIPine 10 MG tablet  Commonly known as: NORVASC   10 mg, Oral, Nightly      aspirin 81 MG EC tablet   1 tablet, Oral, Daily      bumetanide 2 " MG tablet  Commonly known as: BUMEX   4 mg, Oral, 2 Times Daily      carvedilol 25 MG tablet  Commonly known as: COREG   25 mg, Oral, 2 Times Daily With Meals      cetirizine 10 MG tablet  Commonly known as: zyrTEC   10 mg, Oral, Daily      citalopram 40 MG tablet  Commonly known as: CeleXA   40 mg, Oral, Daily      Cranberry 450 MG capsule   450 mg, Oral, Nightly      cyclobenzaprine 10 MG tablet  Commonly known as: FLEXERIL   10 mg, Oral, 3 Times Daily PRN      Diclofenac Sodium 1 % gel gel  Commonly known as: VOLTAREN   1 application, Transdermal, As Needed      Glucosamine-Chondroitin Max St capsule   1 capsule, Oral, Nightly      Lantus SoloStar 100 UNIT/ML injection pen  Generic drug: Insulin Glargine   Inject 20 Units under the skin into the appropriate area as directed. 20 units in the am  10 units in the pm  Per patient      lidocaine 5 %  Commonly known as: LIDODERM   1 patch, Transdermal, Every 24 Hours      Melatonin 10 MG tablet   10 mg, Oral, Nightly      NovoLOG FlexPen 100 UNIT/ML solution pen-injector sc pen  Generic drug: insulin aspart   Sliding scale  3-5 units per meal based on blood sugar  Correction dose as prescribed      omeprazole 40 MG capsule  Commonly known as: priLOSEC   40 mg, Oral, Daily      ondansetron 4 MG tablet  Commonly known as: ZOFRAN   4 mg, Oral      sodium zirconium cyclosilicate 5 g pack  Commonly known as: LOKELMA   5 g, Oral, Daily         Stop These Medications    HYDROcodone-acetaminophen 7.5-325 MG per tablet  Commonly known as: NORCO     mupirocin 2 % ointment  Commonly known as: BACTROBAN            Discharge Diet: low K renal, consistent carb.     Activity at Discharge:   Per 's instructions.    Follow-up Appointments  Obey Santiago MD.  PCP after discharge from [facility].    Discharge took over 30 min       Joan Chavarria MD  09/05/22  09:25 EDT

## 2022-09-05 NOTE — CASE MANAGEMENT/SOCIAL WORK
Case Management Discharge Note      Final Note: Ms. Gduino has a skilled bed at [facility] today, if medically ready.  Confirmed bed with Berenice at facility.  Ms. Gudino meets her Medicare inpatient stay today for the rehab admission.  Notified Ms. Gudino and her significant other, Shahzad, by phone and they are agreeable to DC plan.  [facility] transport is scheduled for today at 2pm.  [facility] will  the patient from the hospital room.  CM left message with [facility] Manager, Berenice, ph [phone], to let her know Ms. Gudino is returning to her HD chair this coming Wednesday, 9/7/22, at 7am.  Ms. Gudino uses [facility] transport for her MWF chair at [facility].  Clinical notes and orders had been faxed to [facility] last week, and CM will fax DC summary to them today, fax [phone].  [facility]'s pharmacy is [facility], and is noted in [employer] for e-scribing.  No COVID test is necessary for [facility].  Please call report to [facility] at ph [phone], and fax the DC summary to fax [phone].  Thank you.   CORRECTION TO [facility] PHONE NUMBER- CALL REPORT -[phone].  Patient requested that her SO, Shahzad, transport her to [facility].  CM cancelled [facility] transport.         Selected Continued Care - Admitted Since 8/30/2022     Destination Coordination complete.    Service Provider Selected Services Address Phone Fax Patient Preferred    [facility]  Skilled Nursing [address] [address] [phone] --                     Dialysis/Infusion Coordination complete.    Service Provider Selected Services Address Phone Fax Patient Preferred    [facility]  Dialysis [address] [place] [id number] [phone] --                  Transportation Services  Ambulance: [facility]    Final Discharge Disposition Code: 03 - skilled nursing facility (SNF)

## 2022-09-06 ENCOUNTER — HOSPITAL ENCOUNTER (OUTPATIENT)
Facility: HOSPITAL | Age: 62
Setting detail: OBSERVATION
End: 2022-09-06
Attending: INTERNAL MEDICINE | Admitting: INTERNAL MEDICINE

## 2022-09-07 DIAGNOSIS — G89.18 ACUTE POSTOPERATIVE PAIN: ICD-10-CM

## 2022-09-07 RX ORDER — GABAPENTIN 600 MG/1
600 TABLET ORAL NIGHTLY
Qty: 20 TABLET | Refills: 0 | Status: SHIPPED | OUTPATIENT
Start: 2022-09-07 | End: 2022-09-12 | Stop reason: SDUPTHER

## 2022-09-07 RX ORDER — OXYCODONE HYDROCHLORIDE 5 MG/1
5 TABLET ORAL EVERY 4 HOURS PRN
Qty: 60 TABLET | Refills: 0 | Status: SHIPPED | OUTPATIENT
Start: 2022-09-07 | End: 2022-09-09

## 2022-09-07 NOTE — TELEPHONE ENCOUNTER
(NEW ADMIT)    KATARZYNA REQUESTING MED REFILL FOR OXYCODONE 5 MG AND GABAPENTIN 600 MG.    DIRECTIONS: OXYCODONE 5 MG 1 PO Q 4 HRS PRN.    GABAPENTIN 600 MG 1 PO Q HS.

## 2022-09-08 ENCOUNTER — TELEPHONE (OUTPATIENT)
Dept: ORTHOPEDIC SURGERY | Facility: CLINIC | Age: 62
End: 2022-09-08

## 2022-09-08 ENCOUNTER — NURSING HOME (OUTPATIENT)
Dept: INTERNAL MEDICINE | Facility: CLINIC | Age: 62
End: 2022-09-08

## 2022-09-08 VITALS
HEART RATE: 66 BPM | RESPIRATION RATE: 18 BRPM | OXYGEN SATURATION: 97 % | TEMPERATURE: 97 F | DIASTOLIC BLOOD PRESSURE: 60 MMHG | BODY MASS INDEX: 27.44 KG/M2 | WEIGHT: 150 LBS | SYSTOLIC BLOOD PRESSURE: 134 MMHG

## 2022-09-08 DIAGNOSIS — N18.6 TYPE 2 DIABETES MELLITUS WITH CHRONIC KIDNEY DISEASE ON CHRONIC DIALYSIS, WITH LONG-TERM CURRENT USE OF INSULIN: ICD-10-CM

## 2022-09-08 DIAGNOSIS — M75.21 BICEPS TENDINITIS OF RIGHT UPPER EXTREMITY: Primary | ICD-10-CM

## 2022-09-08 DIAGNOSIS — E11.22 TYPE 2 DIABETES MELLITUS WITH CHRONIC KIDNEY DISEASE ON CHRONIC DIALYSIS, WITH LONG-TERM CURRENT USE OF INSULIN: ICD-10-CM

## 2022-09-08 DIAGNOSIS — N18.6 ESRD (END STAGE RENAL DISEASE): ICD-10-CM

## 2022-09-08 DIAGNOSIS — M24.511 CONTRACTURE OF JOINT OF RIGHT SHOULDER REGION: ICD-10-CM

## 2022-09-08 DIAGNOSIS — Z79.4 TYPE 2 DIABETES MELLITUS WITH CHRONIC KIDNEY DISEASE ON CHRONIC DIALYSIS, WITH LONG-TERM CURRENT USE OF INSULIN: ICD-10-CM

## 2022-09-08 DIAGNOSIS — Z99.2 TYPE 2 DIABETES MELLITUS WITH CHRONIC KIDNEY DISEASE ON CHRONIC DIALYSIS, WITH LONG-TERM CURRENT USE OF INSULIN: ICD-10-CM

## 2022-09-08 DIAGNOSIS — I10 PRIMARY HYPERTENSION: ICD-10-CM

## 2022-09-08 DIAGNOSIS — G89.18 ACUTE POSTOPERATIVE PAIN: ICD-10-CM

## 2022-09-08 PROCEDURE — 99306 1ST NF CARE HIGH MDM 50: CPT | Performed by: INTERNAL MEDICINE

## 2022-09-08 RX ORDER — HYDROCODONE BITARTRATE AND ACETAMINOPHEN 7.5; 325 MG/1; MG/1
1 TABLET ORAL EVERY 6 HOURS PRN
Qty: 20 TABLET | Refills: 0 | Status: SHIPPED | OUTPATIENT
Start: 2022-09-08 | End: 2022-09-12 | Stop reason: SDUPTHER

## 2022-09-08 NOTE — TELEPHONE ENCOUNTER
This patients occupational therapist is wanting to get the protocol for progression of movements for this patient post right shoulder surgery. Their fax is [phone]

## 2022-09-12 DIAGNOSIS — G89.18 ACUTE POSTOPERATIVE PAIN: ICD-10-CM

## 2022-09-12 RX ORDER — GABAPENTIN 600 MG/1
600 TABLET ORAL NIGHTLY
Qty: 20 TABLET | Refills: 0 | Status: SHIPPED | OUTPATIENT
Start: 2022-09-12

## 2022-09-12 RX ORDER — HYDROCODONE BITARTRATE AND ACETAMINOPHEN 7.5; 325 MG/1; MG/1
1 TABLET ORAL EVERY 6 HOURS PRN
Qty: 20 TABLET | Refills: 0 | Status: SHIPPED | OUTPATIENT
Start: 2022-09-12

## 2022-09-12 NOTE — TELEPHONE ENCOUNTER
(DISCHARGE RX)    KATARZYNA REQUESTING MED REFILL FOR  HYDROCODONE-APAP 7.5-325 MG AND GABAPENTIN 600 MG.    DIRECTIONS: HYDROCODONE-APAP 7.5-325 MG 1 PO Q 6 HRS PRN.    GABAPENTIN 600 MG 1 PO QD.

## 2022-09-13 ENCOUNTER — OFFICE VISIT (OUTPATIENT)
Dept: ORTHOPEDIC SURGERY | Facility: CLINIC | Age: 62
End: 2022-09-13

## 2022-09-13 VITALS — TEMPERATURE: 97.5 F

## 2022-09-13 DIAGNOSIS — M24.511 CONTRACTURE OF JOINT OF RIGHT SHOULDER REGION: ICD-10-CM

## 2022-09-13 DIAGNOSIS — Z96.611 STATUS POST TOTAL SHOULDER ARTHROPLASTY, RIGHT: ICD-10-CM

## 2022-09-13 DIAGNOSIS — M19.011 PRIMARY LOCALIZED OSTEOARTHROSIS OF RIGHT SHOULDER REGION: ICD-10-CM

## 2022-09-13 DIAGNOSIS — M75.21 BICEPS TENDINITIS OF RIGHT UPPER EXTREMITY: ICD-10-CM

## 2022-09-13 DIAGNOSIS — Z96.611 STATUS POST TOTAL REPLACEMENT OF RIGHT SHOULDER: Primary | ICD-10-CM

## 2022-09-13 PROCEDURE — 99024 POSTOP FOLLOW-UP VISIT: CPT | Performed by: ORTHOPAEDIC SURGERY

## 2022-09-13 NOTE — PROGRESS NOTES
[facility] Orthopaedic Surgery Office Follow Up       Office Follow Up Visit       Patient Name: Yashira Gudino    Chief Complaint:   Chief Complaint   Patient presents with   • Post-op     3 weeks s/p Total Shoulder Arthroplasty Right 8/31/22       Referring Physician: No ref. provider found    History of Present Illness:   It has been 3  week(s) since Yashira Gudino's last visit. Yashira Gudino returns to clinic today for F/U: postoperative follow-up of rightBody Part: shoulderReason: arthroplasty. The issue has been ongoing for 3 week(s). Yashira Gudino rates HIS/HER: herpain at 8/10 on the pain scale. Previous/current treatments: bracing. Current symptoms:Symptoms: pain. The pain is worse with any movement of the joint; resting improves the pain. Overall, he/she: sheis doing better. I have reviewed the patient's history of present illness as noted/entered above.    I have reviewed the patient's past medical history, surgical history, social history, family history, medications, and allergies as noted in the electronic medical record and as noted/entered.  I have reviewed the patient's review of systems as noted/enter and updated as noted in the patient's HPI.      RIGHT SHOULDER  Total shoulder arthroplasty 8/31/2022    She went to [facility] following hospitalization at [facility] due to need for diet hemodialysis.  She is pleased that she is now able to get back to her regular schedule.  Counseled that we still have some hurdles in early recovery time period.  But she is overall doing well-off to a great start      Subjective   Subjective      Review of Systems   Constitutional: Negative.  Negative for chills, fatigue and fever.   HENT: Negative.  Negative for congestion and dental problem.    Eyes: Negative.  Negative for blurred vision.   Respiratory: Negative.  Negative for shortness of breath.    Cardiovascular: Negative.  Negative for leg swelling.    Gastrointestinal: Negative.  Negative for abdominal pain.   Endocrine: Negative.  Negative for polyuria.   Genitourinary: Negative.  Negative for difficulty urinating.   Musculoskeletal: Positive for arthralgias.   Skin: Negative.    Allergic/Immunologic: Negative.    Neurological: Negative.    Hematological: Negative.  Negative for adenopathy.   Psychiatric/Behavioral: Negative.  Negative for behavioral problems.        Past Medical History:   Past Medical History:   Diagnosis Date   • Arthritis    • Charcot-Bertha-Tooth disease    • Chronic kidney disease (CKD), stage IV (severe) ([facility])    • Depression    • Diabetes ([facility])    • Dialysis patient ([facility])    • Fibromyalgia    • History of MRSA infection 2020    left finger, left foot   • Hypertension    • Ileostomy in place ([facility])    • Osteoporosis        Past Surgical History:   Past Surgical History:   Procedure Laterality Date   • ANKLE SURGERY     •  SECTION     • COLON RESECTION     • COLONOSCOPY     • COLOSTOMY     • FOOT SURGERY Left     excise Small Toe   • HAND SURGERY Left     Excise IF   • NECK SURGERY     • TONSILLECTOMY     • TOTAL SHOULDER ARTHROPLASTY Right 2022    Procedure: TOTAL SHOULDER ARTHROPLASTY RIGHT;  Surgeon: Obey Santiago MD;  Location: [facility];  Service: Orthopedics;  Laterality: Right;   • TUBAL ABDOMINAL LIGATION         Family History: History reviewed. No pertinent family history.    Social History:   Social History     Socioeconomic History   • Marital status: Legally    Tobacco Use   • Smoking status: Never Smoker   • Smokeless tobacco: Never Used   Vaping Use   • Vaping Use: Never used   Substance and Sexual Activity   • Alcohol use: Not Currently   • Drug use: Never   • Sexual activity: Defer       Medications:   Current Outpatient Medications:   •  amLODIPine (NORVASC) 10 MG tablet, Take 10 mg by mouth Every Night., Disp: , Rfl:   •  aspirin 81 MG EC tablet, Take 1 tablet by mouth Daily., Disp: , Rfl:    •  bumetanide (BUMEX) 2 MG tablet, Take 4 mg by mouth 2 (Two) Times a Day., Disp: , Rfl:   •  carvedilol (COREG) 25 MG tablet, Take 25 mg by mouth 2 (Two) Times a Day With Meals., Disp: , Rfl:   •  cetirizine (zyrTEC) 10 MG tablet, Take 10 mg by mouth Daily., Disp: , Rfl:   •  citalopram (CeleXA) 40 MG tablet, Take 40 mg by mouth Daily., Disp: , Rfl:   •  Cranberry 450 MG capsule, Take 450 mg by mouth Every Night., Disp: , Rfl:   •  cyclobenzaprine (FLEXERIL) 10 MG tablet, Take 10 mg by mouth 3 (Three) Times a Day As Needed for Muscle Spasms (Taken at night)., Disp: , Rfl:   •  Diclofenac Sodium (VOLTAREN) 1 % gel gel, Place 1 application on the skin as directed by provider As Needed (joint pain)., Disp: , Rfl:   •  gabapentin (NEURONTIN) 600 MG tablet, Take 1 tablet by mouth Every Night., Disp: 20 tablet, Rfl: 0  •  Glucosamine-Chondroit-Vit C-Mn (Glucosamine-Chondroitin Max St) capsule, Take 1 capsule by mouth Every Night., Disp: , Rfl:   •  insulin aspart (NovoLOG FlexPen) 100 UNIT/ML solution pen-injector sc pen, Sliding scale 3-5 units per meal based on blood sugar Correction dose as prescribed, Disp: , Rfl:   •  Insulin Glargine (Lantus SoloStar) 100 UNIT/ML injection pen, Inject 20 Units under the skin into the appropriate area as directed. 20 units in the am 10 units in the pm Per patient, Disp: , Rfl:   •  lidocaine (LIDODERM) 5 %, Place 1 patch on the skin as directed by provider Daily., Disp: , Rfl:   •  Melatonin 10 MG tablet, Take 10 mg by mouth Every Night., Disp: , Rfl:   •  omeprazole (priLOSEC) 40 MG capsule, Take 40 mg by mouth Daily., Disp: , Rfl:   •  ondansetron (ZOFRAN) 4 MG tablet, Take 4 mg by mouth., Disp: , Rfl:   •  sodium zirconium cyclosilicate (LOKELMA) 5 g pack, Take 5 g by mouth Daily., Disp: , Rfl:   •  HYDROcodone-acetaminophen (NORCO) 7.5-325 MG per tablet, Take 1 tablet by mouth Every 6 (Six) Hours As Needed for Moderate Pain., Disp: 20 tablet, Rfl: 0    Allergies:   Allergies    Allergen Reactions   • Erythromycin Nausea Only and Other (See Comments)     nausea  upset stomach   • Erythromycin Base Other (See Comments)     nausea         The following portions of the patient's history were reviewed and updated as appropriate: allergies, current medications, past family history, past medical history, past social history, past surgical history and problem list.        Objective    Objective      Vital Signs:   Vitals:    09/13/22 1528   Temp: 97.5 °F (36.4 °C)       Ortho Exam:  General: comfortable  Vascular: 2+ radial pulse  Neurologic: sensation to light touch is intact distally, elbow flexion/elbow extension/wrist flexion/wrist extension/hand intrinsics intact, able to fire deltoid  Dermatologic: surgical incision is well appearing, with no drainage or surrounding erythema; no signs or symptoms of infection or DVT.  Incision well-appearing at this point      Results Review:  Imaging Results (Last 24 Hours)     Procedure Component Value Units Date/Time    XR Shoulder 2+ View Right [[id number]] Resulted: 09/13/22 1541     Updated: 09/13/22 1541    Narrative:      Imaging: shoulder x-rays 3 views - AP, axillary, and scapular-Y x-ray   views    Side: RIGHT SHOULDER    Indication for shoulder x-ray 3 views: shoulder pain    Comparison: preoperative and postoperative imaging    Findings: No acute bony pathology. Implants well appearing and well   positioned after shoulder replacement.  Located and no fracture noted.    Right anatomical total shoulder arthroplasty shows stable position, well   sized.  Located.  Incidentally noted enchondroma is again noted in the   proximal humerus.    I personally reviewed the above x-rays and discussed with the patient.            XR Shoulder 1 View Right    Result Date: 8/31/2022  No acute osseous abnormality. Post surgical changes from right shoulder arthroplasty with no process identified.  This report was finalized on 8/31/2022 1:36 PM by Maria Elena Stone MD.       XR Shoulder 2+ View Right    Result Date: 5/19/2022  Glenohumeral and acromioclavicular osteoarthrosis. Probable chondroid lesion of the proximal humeral metaphysis is similar in appearance the prior study. CRITICAL RESULT:   No. COMMUNICATION: Per this written report. By electronically signing this report, I, the attending physician, attest that I have personally reviewed the images/data for the above examination(s) and agree with the final edited report. Dictated by Aurora Kumari M.D. on 5/19/2022 1:48 PM Signed by Alec Owen on 5/19/2022 2:05 PM      Procedures        Assessment / Plan      Assessment/Plan:   Problem List Items Addressed This Visit        Musculoskeletal and Injuries    Primary localized osteoarthrosis of right shoulder region    Relevant Orders    Ambulatory Referral to Physical Therapy POST OP    Contracture of joint of right shoulder region    Relevant Medications    cyclobenzaprine (FLEXERIL) 10 MG tablet    gabapentin (NEURONTIN) 600 MG tablet    Other Relevant Orders    Ambulatory Referral to Physical Therapy POST OP    Biceps tendinitis of right upper extremity    Relevant Orders    Ambulatory Referral to Physical Therapy POST OP    Status post total shoulder arthroplasty, right - Primary    Relevant Orders    Ambulatory Referral to Physical Therapy POST OP          RIGHT SHOULDER status post anatomic total shoulder arthroplasty    1. Physical therapy prescription and physical therapy protocol were given to the patient.  I counseled regarded the importance of participation with physical therapy, sling compliance, and non-weight bearing.    Sling for 1-2 more weeks    2.  Pain control -improvements following surgery, pleased at this time    3. Follow-up in 2 months      Follow Up: 2 months with RIGHT shoulder 3 views      Obey Santiago MD, FAAOS  Orthopedic Surgeon  Fellowship Trained Shoulder and Elbow Surgeon  [facility]  Orthopedics and Sports Medicine  [id number], [address]  [place] [address]    09/13/22  15:57 EDT

## 2022-09-13 NOTE — PROGRESS NOTES
Nursing Home History and Physical       Hairal Holt DO []  EMORY Mari []  [address] [place] [address]  Phone: [phone]  Fax: [phone] Reyes Wood MD []  Aureliano Alcantara DO [x]   [address] [place] [address]  Phone: [phone]  Fax: [phone]     PATIENT NAME: Yashira Gudino                                                                          YOB: 1960           DATE OF SERVICE: 09/08/2022  FACILITY:  []  [facility]   [] [facility]  [] [facility]   [] [facility]   []  [facility]   [x]  [facility]  []  [place]    CHIEF COMPLAINT:  Nursing facility admission      HISTORY OF PRESENT ILLNESS:   Patient is a 61-year-old white female with a history of end-stage renal disease, type 2 diabetes mellitus, and hypertension who was recently hospitalized at [facility] for right shoulder osteoarthritis for which she underwent right shoulder total arthroplasty.  Patient had an uncomplicated surgery and has been instructed to keep her right arm in a sling to remain immobilized.  During her hospitalization, patient did require close monitoring for her brittle diabetes.  Pain medications were adjusted and patient was discharged with her current regimen.    On exam today, patient shares that her home regimen with her pain management had been higher doses than what she is currently receiving.  She wished to return to her long-term pain medication regimen with Norco 7.5 every 6 hours rather than her oxycodone.    It is notable that the patient has had behavior issues with the nursing staff.  He was calm and composed during our exam.  It does appear that the patient will likely discharge over the weekend as she has limited benefit with physical therapy while she has the joint immobilized.      PAST MEDICAL & SURGICAL HISTORY:   Past Medical History:   Diagnosis Date   • Arthritis    • Charcot-Bertha-Tooth disease    • Chronic kidney disease  (CKD), stage IV (severe) ([facility])    • Depression    • Diabetes (HCC)    • Dialysis patient ([facility])    • Fibromyalgia    • History of MRSA infection 2020    left finger, left foot   • Hypertension    • Ileostomy in place (HCC)    • Osteoporosis       Past Surgical History:   Procedure Laterality Date   • ANKLE SURGERY     •  SECTION     • COLON RESECTION     • COLONOSCOPY     • COLOSTOMY     • FOOT SURGERY Left     excise Small Toe   • HAND SURGERY Left     Excise IF   • NECK SURGERY     • TONSILLECTOMY     • TOTAL SHOULDER ARTHROPLASTY Right 2022    Procedure: TOTAL SHOULDER ARTHROPLASTY RIGHT;  Surgeon: Obey Santiago MD;  Location: [facility];  Service: Orthopedics;  Laterality: Right;   • TUBAL ABDOMINAL LIGATION           MEDICATIONS:  I have reviewed and reconciled the patients medication list in the patients chart at the skilled nursing facility on 2022.      ALLERGIES:  Allergies   Allergen Reactions   • Erythromycin Nausea Only and Other (See Comments)     nausea  upset stomach   • Erythromycin Base Other (See Comments)     nausea           SOCIAL HISTORY:  Social History     Socioeconomic History   • Marital status: Legally    Tobacco Use   • Smoking status: Never Smoker   • Smokeless tobacco: Never Used   Vaping Use   • Vaping Use: Never used   Substance and Sexual Activity   • Alcohol use: Not Currently   • Drug use: Never   • Sexual activity: Defer       FAMILY HISTORY:  No family history on file.     REVIEW OF SYSTEMS:  Review of Systems   Constitutional: Negative for chills, fatigue and fever.   HENT: Negative for congestion, ear pain, rhinorrhea, sinus pressure and sore throat.    Eyes: Negative for visual disturbance.   Respiratory: Negative for cough, chest tightness, shortness of breath and wheezing.    Cardiovascular: Negative for chest pain, palpitations and leg swelling.   Gastrointestinal: Negative for abdominal pain, blood in stool, constipation, diarrhea, nausea  and vomiting.   Endocrine: Negative for polydipsia and polyuria.   Genitourinary: Negative for dysuria and hematuria.   Musculoskeletal: Negative for arthralgias and back pain.   Skin: Negative for rash.   Neurological: Negative for dizziness, light-headedness, numbness and headaches.   Psychiatric/Behavioral: Negative for dysphoric mood and sleep disturbance. The patient is not nervous/anxious.          PHYSICAL EXAMINATION:   VITAL SIGNS: /60   Pulse 66   Temp 97 °F (36.1 °C)   Resp 18   Wt 68 kg (150 lb)   SpO2 97%   BMI 27.44 kg/m²     Physical Exam  Vitals and nursing note reviewed.   Constitutional:       Appearance: Normal appearance. She is well-developed.   HENT:      Head: Normocephalic and atraumatic.   Eyes:      Extraocular Movements: Extraocular movements intact.      Conjunctiva/sclera: Conjunctivae normal.   Pulmonary:      Effort: Pulmonary effort is normal.   Musculoskeletal:      Cervical back: Normal range of motion and neck supple.   Skin:     General: Skin is warm and dry.      Findings: No rash.   Neurological:      General: No focal deficit present.      Mental Status: She is alert and oriented to person, place, and time.   Psychiatric:         Mood and Affect: Mood normal.         Behavior: Behavior normal.         RECORDS REVIEW:   Discharge Summary from [facility] 8/30/2022    Results for orders placed or performed during the hospital encounter of 08/30/22   CBC Auto Differential    Specimen: Blood   Result Value Ref Range    WBC 5.65 3.40 - 10.80 10*3/mm3    RBC 3.73 (L) 3.77 - 5.28 10*6/mm3    Hemoglobin 11.7 (L) 12.0 - 15.9 g/dL    Hematocrit 35.8 34.0 - 46.6 %    MCV 96.0 79.0 - 97.0 fL    MCH 31.4 26.6 - 33.0 pg    MCHC 32.7 31.5 - 35.7 g/dL    RDW 18.2 (H) 12.3 - 15.4 %    RDW-SD 62.7 (H) 37.0 - 54.0 fl    MPV 10.2 6.0 - 12.0 fL    Platelets 178 140 - 450 10*3/mm3    Neutrophil % 42.1 (L) 42.7 - 76.0 %    Lymphocyte % 34.2 19.6 - 45.3 %    Monocyte % 12.9  (H) 5.0 - 12.0 %    Eosinophil % 9.0 (H) 0.3 - 6.2 %    Basophil % 0.9 0.0 - 1.5 %    Immature Grans % 0.9 (H) 0.0 - 0.5 %    Neutrophils, Absolute 2.38 1.70 - 7.00 10*3/mm3    Lymphocytes, Absolute 1.93 0.70 - 3.10 10*3/mm3    Monocytes, Absolute 0.73 0.10 - 0.90 10*3/mm3    Eosinophils, Absolute 0.51 (H) 0.00 - 0.40 10*3/mm3    Basophils, Absolute 0.05 0.00 - 0.20 10*3/mm3    Immature Grans, Absolute 0.05 0.00 - 0.05 10*3/mm3    nRBC 0.0 0.0 - 0.2 /100 WBC   Renal Function Panel    Specimen: Blood   Result Value Ref Range    Glucose 82 65 - 99 mg/dL    BUN 43 (H) 8 - 23 mg/dL    Creatinine 8.68 (H) 0.57 - 1.00 mg/dL    Sodium 129 (L) 136 - 145 mmol/L    Potassium 5.6 (H) 3.5 - 5.2 mmol/L    Chloride 93 (L) 98 - 107 mmol/L    CO2 24.0 22.0 - 29.0 mmol/L    Calcium 8.6 8.6 - 10.5 mg/dL    Albumin 3.90 3.50 - 5.20 g/dL    Phosphorus 7.3 (H) 2.5 - 4.5 mg/dL    Anion Gap 12.0 5.0 - 15.0 mmol/L    BUN/Creatinine Ratio 5.0 (L) 7.0 - 25.0    eGFR 4.8 (L) >60.0 mL/min/1.73   Hepatitis Panel, Acute    Specimen: Blood   Result Value Ref Range    Hepatitis B Surface Ag Non-Reactive Non-Reactive    Hep A IgM Non-Reactive Non-Reactive    Hep B C IgM Non-Reactive Non-Reactive    Hepatitis C Ab Non-Reactive Non-Reactive   Basic Metabolic Panel    Specimen: Blood   Result Value Ref Range    Glucose 174 (H) 65 - 99 mg/dL    BUN 34 (H) 8 - 23 mg/dL    Creatinine 7.35 (H) 0.57 - 1.00 mg/dL    Sodium 129 (L) 136 - 145 mmol/L    Potassium 5.7 (H) 3.5 - 5.2 mmol/L    Chloride 96 (L) 98 - 107 mmol/L    CO2 22.0 22.0 - 29.0 mmol/L    Calcium 8.5 (L) 8.6 - 10.5 mg/dL    BUN/Creatinine Ratio 4.6 (L) 7.0 - 25.0    Anion Gap 11.0 5.0 - 15.0 mmol/L    eGFR 5.9 (L) >60.0 mL/min/1.73   Basic Metabolic Panel    Specimen: Blood   Result Value Ref Range    Glucose 544 (C) 65 - 99 mg/dL    BUN 55 (H) 8 - 23 mg/dL    Creatinine 8.34 (H) 0.57 - 1.00 mg/dL    Sodium 120 (L) 136 - 145 mmol/L    Potassium 8.2 (C) 3.5 - 5.2 mmol/L    Chloride 89 (L) 98 -  107 mmol/L    CO2 18.0 (L) 22.0 - 29.0 mmol/L    Calcium 8.5 (L) 8.6 - 10.5 mg/dL    BUN/Creatinine Ratio 6.6 (L) 7.0 - 25.0    Anion Gap 13.0 5.0 - 15.0 mmol/L    eGFR 5.1 (L) >60.0 mL/min/1.73   Hemoglobin & Hematocrit, Blood    Specimen: Blood   Result Value Ref Range    Hemoglobin 11.5 (L) 12.0 - 15.9 g/dL    Hematocrit 36.1 34.0 - 46.6 %   Potassium    Specimen: Blood   Result Value Ref Range    Potassium 3.5 3.5 - 5.2 mmol/L   Renal Function Panel    Specimen: Blood   Result Value Ref Range    Glucose 158 (H) 65 - 99 mg/dL    BUN 26 (H) 8 - 23 mg/dL    Creatinine 5.98 (H) 0.57 - 1.00 mg/dL    Sodium 132 (L) 136 - 145 mmol/L    Potassium 4.3 3.5 - 5.2 mmol/L    Chloride 96 (L) 98 - 107 mmol/L    CO2 22.0 22.0 - 29.0 mmol/L    Calcium 8.7 8.6 - 10.5 mg/dL    Albumin 4.00 3.50 - 5.20 g/dL    Phosphorus 5.9 (H) 2.5 - 4.5 mg/dL    Anion Gap 14.0 5.0 - 15.0 mmol/L    BUN/Creatinine Ratio 4.3 (L) 7.0 - 25.0    eGFR 7.6 (L) >60.0 mL/min/1.73   Renal Function Panel    Specimen: Blood   Result Value Ref Range    Glucose 98 65 - 99 mg/dL    BUN 42 (H) 8 - 23 mg/dL    Creatinine 7.15 (H) 0.57 - 1.00 mg/dL    Sodium 127 (L) 136 - 145 mmol/L    Potassium 5.7 (H) 3.5 - 5.2 mmol/L    Chloride 95 (L) 98 - 107 mmol/L    CO2 20.0 (L) 22.0 - 29.0 mmol/L    Calcium 8.0 (L) 8.6 - 10.5 mg/dL    Albumin 3.20 (L) 3.50 - 5.20 g/dL    Phosphorus 8.4 (H) 2.5 - 4.5 mg/dL    Anion Gap 12.0 5.0 - 15.0 mmol/L    BUN/Creatinine Ratio 5.9 (L) 7.0 - 25.0    eGFR 6.1 (L) >60.0 mL/min/1.73   CBC (No Diff)    Specimen: Blood   Result Value Ref Range    WBC 4.78 3.40 - 10.80 10*3/mm3    RBC 3.14 (L) 3.77 - 5.28 10*6/mm3    Hemoglobin 10.3 (L) 12.0 - 15.9 g/dL    Hematocrit 31.5 (L) 34.0 - 46.6 %    .3 (H) 79.0 - 97.0 fL    MCH 32.8 26.6 - 33.0 pg    MCHC 32.7 31.5 - 35.7 g/dL    RDW 17.8 (H) 12.3 - 15.4 %    RDW-SD 66.4 (H) 37.0 - 54.0 fl    MPV 10.1 6.0 - 12.0 fL    Platelets 132 (L) 140 - 450 10*3/mm3   POC Glucose Once    Specimen: Blood    Result Value Ref Range    Glucose 119 70 - 130 mg/dL   POC Glucose Once    Specimen: Blood   Result Value Ref Range    Glucose 122 70 - 130 mg/dL   POC Glucose Once    Specimen: Blood   Result Value Ref Range    Glucose 146 (H) 70 - 130 mg/dL   POC Glucose Once    Specimen: Blood   Result Value Ref Range    Glucose 79 70 - 130 mg/dL   POC Glucose Once    Specimen: Blood   Result Value Ref Range    Glucose 489 (C) 70 - 130 mg/dL   POC Glucose Once    Specimen: Blood   Result Value Ref Range    Glucose 163 (H) 70 - 130 mg/dL   POC Glucose Once    Specimen: Blood   Result Value Ref Range    Glucose 203 (H) 70 - 130 mg/dL   POC Glucose Once    Specimen: Blood   Result Value Ref Range    Glucose 123 70 - 130 mg/dL   POC Glucose Once    Specimen: Blood   Result Value Ref Range    Glucose 170 (H) 70 - 130 mg/dL   POC Glucose Once    Specimen: Blood   Result Value Ref Range    Glucose 245 (H) 70 - 130 mg/dL   POC Glucose Once    Specimen: Blood   Result Value Ref Range    Glucose 152 (H) 70 - 130 mg/dL   POC Glucose Once    Specimen: Blood   Result Value Ref Range    Glucose 30 (C) 70 - 130 mg/dL   POC Glucose Once    Specimen: Blood   Result Value Ref Range    Glucose 35 (C) 70 - 130 mg/dL   POC Glucose Once    Specimen: Blood   Result Value Ref Range    Glucose 156 (H) 70 - 130 mg/dL   POC Glucose Once    Specimen: Blood   Result Value Ref Range    Glucose 275 (H) 70 - 130 mg/dL   POC Glucose Once    Specimen: Blood   Result Value Ref Range    Glucose 152 (H) 70 - 130 mg/dL   POC Glucose Once    Specimen: Blood   Result Value Ref Range    Glucose 223 (H) 70 - 130 mg/dL   POC Glucose Once    Specimen: Blood   Result Value Ref Range    Glucose 120 70 - 130 mg/dL   POC Glucose Once    Specimen: Blood   Result Value Ref Range    Glucose 193 (H) 70 - 130 mg/dL   POC Glucose Once    Specimen: Blood   Result Value Ref Range    Glucose 145 (H) 70 - 130 mg/dL   POC Glucose Once    Specimen: Blood   Result Value Ref Range     Glucose 156 (H) 70 - 130 mg/dL   POC Glucose Once    Specimen: Blood   Result Value Ref Range    Glucose 51 (L) 70 - 130 mg/dL   POC Glucose Once    Specimen: Blood   Result Value Ref Range    Glucose 100 70 - 130 mg/dL   POC Glucose Once    Specimen: Blood   Result Value Ref Range    Glucose 87 70 - 130 mg/dL   POC Glucose Once    Specimen: Blood   Result Value Ref Range    Glucose 86 70 - 130 mg/dL   POC Glucose Once    Specimen: Blood   Result Value Ref Range    Glucose 96 70 - 130 mg/dL   POC Glucose Once    Specimen: Blood   Result Value Ref Range    Glucose 158 (H) 70 - 130 mg/dL   POC Glucose Once    Specimen: Blood   Result Value Ref Range    Glucose 43 (C) 70 - 130 mg/dL   POC Glucose Once    Specimen: Blood   Result Value Ref Range    Glucose 45 (C) 70 - 130 mg/dL   POC Glucose Once    Specimen: Blood   Result Value Ref Range    Glucose 82 70 - 130 mg/dL   POC Glucose Once    Specimen: Blood   Result Value Ref Range    Glucose 87 70 - 130 mg/dL   POC Glucose Once    Specimen: Blood   Result Value Ref Range    Glucose 185 (H) 70 - 130 mg/dL   POC Glucose Once    Specimen: Blood   Result Value Ref Range    Glucose 282 (H) 70 - 130 mg/dL   POC Glucose Once    Specimen: Blood   Result Value Ref Range    Glucose 252 (H) 70 - 130 mg/dL   POC Glucose Once    Specimen: Blood   Result Value Ref Range    Glucose 208 (H) 70 - 130 mg/dL   POC Glucose Once    Specimen: Blood   Result Value Ref Range    Glucose 195 (H) 70 - 130 mg/dL        ASSESSMENT   Diagnoses and all orders for this visit:    1. Biceps tendinitis of right upper extremity (Primary)    2. Acute postoperative pain  -     Discontinue: HYDROcodone-acetaminophen (NORCO) 7.5-325 MG per tablet; Take 1 tablet by mouth Every 6 (Six) Hours As Needed for Moderate Pain.  Dispense: 20 tablet; Refill: 0    3. Contracture of joint of right shoulder region    4. ESRD (end stage renal disease) (HCC)    5. Primary hypertension    6. Type 2 diabetes mellitus with  chronic kidney disease on chronic dialysis, with long-term current use of insulin (HCC)        PLAN    Right shoulder osteoarthritis status post total arthroplasty  - Follow-up with orthopedics as scheduled.  - Right upper extremity to remain immobilized in sling  - Due to lack of benefit with immediate physical therapy at this time, PT services has discharged the patient  - Patient may consider starting PT at home with home health when she is allowed to use the arm.    Chronic pain management  - Asad was reviewed today.  Patient does actually take Norco 7.5 every 6 hours as needed.  This will be continued in the facility.  In the outpatient setting, she does follow with pain management.  I will not continue pain medications on discharge.  Discontinue current regimen of oxycodone.    Behavior issues in facility  -Patient has been demanding behavior with nursing staff at times with unreasonable expectations.  Patient clearly does not want to stay in the facility and arrangements will be made to discharge him home safely possibly by the weekend.  Even in the few hours hours at the facility, patient was changing her mind frequently.    End-stage renal disease  - Continue following with nephrology and dialysis 3 times a week.    Primary hypertension  - Blood pressure remained stable at this time.    Type 2 diabetes mellitus  - Continue insulin regimen with SSI.    [x]  Discussed Patient in detail with nursing/staff, addressed all needs today.     [x]  Plan of Care Reviewed   [x]  PT/OT Reviewed   [x]  Order Changes  []  Discharge Plans Reviewed  [x]  Advance Directive on file with Nursing Home.   [x]  POA on file with Nursing Home.    [x]  Code Status listed and reviewed.       Aureliano Alcantara DO.  9/13/2022      **Part of this note may be an electronic transcription/translation of spoken language to printed text using the Dragon Dictation System.**

## 2022-12-27 ENCOUNTER — OFFICE VISIT (OUTPATIENT)
Dept: ORTHOPEDIC SURGERY | Facility: CLINIC | Age: 62
End: 2022-12-27

## 2022-12-27 VITALS
WEIGHT: 147.2 LBS | SYSTOLIC BLOOD PRESSURE: 160 MMHG | HEIGHT: 62 IN | DIASTOLIC BLOOD PRESSURE: 100 MMHG | BODY MASS INDEX: 27.09 KG/M2

## 2022-12-27 DIAGNOSIS — M75.52 BURSITIS OF LEFT SHOULDER: ICD-10-CM

## 2022-12-27 DIAGNOSIS — M75.42 IMPINGEMENT SYNDROME OF LEFT SHOULDER: ICD-10-CM

## 2022-12-27 DIAGNOSIS — Z96.611 STATUS POST TOTAL REPLACEMENT OF RIGHT SHOULDER: Primary | ICD-10-CM

## 2022-12-27 DIAGNOSIS — S40.012A CONTUSION OF LEFT SHOULDER, INITIAL ENCOUNTER: ICD-10-CM

## 2022-12-27 PROCEDURE — 99214 OFFICE O/P EST MOD 30 MIN: CPT | Performed by: ORTHOPAEDIC SURGERY

## 2022-12-27 RX ORDER — SEVELAMER CARBONATE 800 MG/1
1600 TABLET, FILM COATED ORAL
COMMUNITY
Start: 2022-10-10 | End: 2023-10-10

## 2022-12-27 NOTE — PROGRESS NOTES
[facility] [place] Orthopaedic Surgery Office Follow Up       Office Follow Up Visit       Patient Name: Yashira Gudino    Chief Complaint:   Chief Complaint   Patient presents with   • Left Shoulder - Pain, Initial Evaluation   • Follow-up     3 month follow up; 4 months Status post total shoulder arthroplasty, right 8/31/22       Referring Physician: No ref. provider found    History of Present Illness:   It has been 3  month(s) since Yashira Gudino's last visit. Yashira Gudino returns to clinic today for F/U: follow-up of rightBody Part: shoulderReason: arthroplasty. The issue has been ongoing for 4 month(s). Yashira Gudino rates HIS/HER: herpain at 6/10 on the pain scale. Previous/current treatments: bracing and physical therapy. Current symptoms:Symptoms: pain. The pain is worse with lying on affected side; resting and pain medication and/or NSAID improves the pain. Overall, he/she: sheis doing better. I have reviewed the patient's history of present illness as noted/entered above.    I have reviewed the patient's past medical history, surgical history, social history, family history, medications, and allergies as noted in the electronic medical record and as noted/entered.  I have reviewed the patient's review of systems as noted/enter and updated as noted in the patient's HPI.      Yashira Gudino is a 62 y.o. female who presents with new problem of: left shoulder pain.  Onset: mechanical fall. The issue has been ongoing for 6 week(s). Pain is a 7/10 on the pain scale. Pain is described as aching. Associated symptoms include pain. The pain is worse with lying on affected side; resting and pain medication and/or NSAID improve the pain. Previous treatments have included: nothing.      Right anatomic total shoulder arthroplasty completed 8/31/2022 12/27/2022:  Right shoulder is 4 months postop doing well improvements noted  Left shoulder unfortunately she  suffered a fall in the shoulder has gotten worse after the fall.  PCP note from  and x-rays from  reviewed no acute bony fracture noted.  She is interested in PT which I think is a great idea she has been at [facility] PCP note reviewed from 12/15/2022, complex medical history as noted prior   x-rays completed left shoulder 3 views 2022 no acute fracture noted.  Baseline degenerative changes noted.    Grandchildren x2 in [place]    Subjective   Subjective      Review of Systems   Constitutional: Negative.  Negative for chills, fatigue and fever.   HENT: Negative.  Negative for congestion and dental problem.    Eyes: Negative.  Negative for blurred vision.   Respiratory: Negative.  Negative for shortness of breath.    Cardiovascular: Negative.  Negative for leg swelling.   Gastrointestinal: Negative.  Negative for abdominal pain.   Endocrine: Negative.  Negative for polyuria.   Genitourinary: Negative.  Negative for difficulty urinating.   Musculoskeletal: Positive for arthralgias.   Skin: Negative.    Allergic/Immunologic: Negative.    Neurological: Negative.    Hematological: Negative.  Negative for adenopathy.   Psychiatric/Behavioral: Negative.  Negative for behavioral problems.        Past Medical History:   Past Medical History:   Diagnosis Date   • Arthritis    • Charcot-Bertha-Tooth disease    • Chronic kidney disease (CKD), stage IV (severe) ([facility])    • Depression    • Diabetes ([facility])    • Dialysis patient ([facility])    • Fibromyalgia    • History of MRSA infection 2020    left finger, left foot   • Hypertension    • Ileostomy in place ([facility])    • Osteoporosis        Past Surgical History:   Past Surgical History:   Procedure Laterality Date   • ANKLE SURGERY     •  SECTION     • COLON RESECTION     • COLONOSCOPY     • COLOSTOMY     • FOOT SURGERY Left     excise Small Toe   • HAND SURGERY Left     Excise IF   • NECK SURGERY     • TONSILLECTOMY     • TOTAL SHOULDER  ARTHROPLASTY Right 8/31/2022    Procedure: TOTAL SHOULDER ARTHROPLASTY RIGHT;  Surgeon: Obey Santiago MD;  Location: [facility];  Service: Orthopedics;  Laterality: Right;   • TUBAL ABDOMINAL LIGATION         Family History: History reviewed. No pertinent family history.    Social History:   Social History     Socioeconomic History   • Marital status: Legally    Tobacco Use   • Smoking status: Never   • Smokeless tobacco: Never   Vaping Use   • Vaping Use: Never used   Substance and Sexual Activity   • Alcohol use: Not Currently   • Drug use: Never   • Sexual activity: Defer       Medications:   Current Outpatient Medications:   •  amLODIPine (NORVASC) 10 MG tablet, Take 10 mg by mouth Every Night., Disp: , Rfl:   •  aspirin 81 MG EC tablet, Take 1 tablet by mouth Daily., Disp: , Rfl:   •  carvedilol (COREG) 25 MG tablet, Take 25 mg by mouth 2 (Two) Times a Day With Meals., Disp: , Rfl:   •  cetirizine (zyrTEC) 10 MG tablet, Take 10 mg by mouth Daily., Disp: , Rfl:   •  citalopram (CeleXA) 40 MG tablet, Take 40 mg by mouth Daily., Disp: , Rfl:   •  Cranberry 450 MG capsule, Take 450 mg by mouth Every Night., Disp: , Rfl:   •  cyclobenzaprine (FLEXERIL) 10 MG tablet, Take 10 mg by mouth 3 (Three) Times a Day As Needed for Muscle Spasms (Taken at night)., Disp: , Rfl:   •  Diclofenac Sodium (VOLTAREN) 1 % gel gel, Place 1 application on the skin as directed by provider As Needed (joint pain)., Disp: , Rfl:   •  gabapentin (NEURONTIN) 600 MG tablet, Take 1 tablet by mouth Every Night., Disp: 20 tablet, Rfl: 0  •  Glucosamine-Chondroit-Vit C-Mn (Glucosamine-Chondroitin Max St) capsule, Take 1 capsule by mouth Every Night., Disp: , Rfl:   •  HYDROcodone-acetaminophen (NORCO) 7.5-325 MG per tablet, Take 1 tablet by mouth Every 6 (Six) Hours As Needed for Moderate Pain., Disp: 20 tablet, Rfl: 0  •  insulin aspart (NovoLOG FlexPen) 100 UNIT/ML solution pen-injector sc pen, Sliding scale 3-5 units per meal  "based on blood sugar Correction dose as prescribed, Disp: , Rfl:   •  Insulin Glargine (Lantus SoloStar) 100 UNIT/ML injection pen, Inject 20 Units under the skin into the appropriate area as directed. 20 units in the am 10 units in the pm Per patient, Disp: , Rfl:   •  lidocaine (LIDODERM) 5 %, Place 1 patch on the skin as directed by provider Daily., Disp: , Rfl:   •  Melatonin 10 MG tablet, Take 10 mg by mouth Every Night., Disp: , Rfl:   •  omeprazole (priLOSEC) 40 MG capsule, Take 40 mg by mouth Daily., Disp: , Rfl:   •  ondansetron (ZOFRAN) 4 MG tablet, Take 4 mg by mouth., Disp: , Rfl:   •  sevelamer (RENVELA) 800 MG tablet, Take 1,600 mg by mouth., Disp: , Rfl:   •  sodium zirconium cyclosilicate (LOKELMA) 5 g pack, Take 5 g by mouth Daily., Disp: , Rfl:   •  bumetanide (BUMEX) 2 MG tablet, Take 4 mg by mouth 2 (Two) Times a Day., Disp: , Rfl:     Allergies:   Allergies   Allergen Reactions   • Erythromycin Nausea Only and Other (See Comments)     nausea  upset stomach   • Erythromycin Base Other (See Comments)     nausea         The following portions of the patient's history were reviewed and updated as appropriate: allergies, current medications, past family history, past medical history, past social history, past surgical history and problem list.        Objective    Objective      Vital Signs:   Vitals:    12/27/22 1503   BP: 160/100   Weight: 66.8 kg (147 lb 3.2 oz)   Height: 157.5 cm (62.01\")       Ortho Exam:  LEFT SHOULDER some posterior pain she noted there was some bruising no obvious bony tenderness no obvious rotator cuff pathology but impingement noted positive Neer positive Clemente    RIGHT shoulder  Good RC exam  Incision pristine appearing  Good Sandra strength noted on the right active and passive range of motion of improved    Results Review:  Imaging Results (Last 24 Hours)     Procedure Component Value Units Date/Time    XR Shoulder 2+ View Right [[id number]] Resulted: 12/27/22 1516     " Updated: 12/27/22 1517    Narrative:      Imaging: shoulder x-rays 3 views - AP, axillary, and scapular-Y x-ray   views    Side: RIGHT SHOULDER    Indication for shoulder x-ray 3 views: shoulder pain    Comparison: Postoperative imaging    Findings: No acute bony pathology. Implants well appearing and well   positioned after shoulder replacement.  Located and no fracture noted.    Right shoulder 3 views after anatomic total shoulder arthroplasty show   stable findings compared to prior.  Shoulder is well balanced on axillary   images.  Evidence of prior neck hardware noted, catheter noted,   incidentally noted enchondroma stable in the proximal humerus distal to   the anatomical implant.    I personally reviewed the above x-rays.            XR Shoulder 2+ View Left    Result Date: 11/14/2022  No acute osseous abnormality. CRITICAL RESULT:   No. COMMUNICATION: Per this written report. Dictated by Luciana Moya DO on 11/14/2022 10:59 PM Signed by Luciana Moya DO on 11/14/2022 11:00 PM    Left shoulder radiographs personally reviewed within the [facility] system.        Procedures            Assessment / Plan      Assessment/Plan:   Problem List Items Addressed This Visit        Musculoskeletal and Injuries    Status post total replacement of right shoulder - Primary    Relevant Orders    Ambulatory Referral to Physical Therapy Evaluate and treat, Ortho, POST OP    Contusion of left shoulder    Relevant Orders    Ambulatory Referral to Physical Therapy Evaluate and treat, Ortho, POST OP    Impingement syndrome of left shoulder    Relevant Orders    Ambulatory Referral to Physical Therapy Evaluate and treat, Ortho, POST OP    Bursitis of left shoulder    Relevant Orders    Ambulatory Referral to Physical Therapy Evaluate and treat, Ortho, POST OP       Right shoulder status post anatomic total shoulder arthroplasty doing well.  She understands with renal failure the risk of bony changes and implant changes potential  over time.  Overall the right shoulder is doing well and she should continue with physical therapy new prescription provided.    Left shoulder new finding with acute pain following a fall no obvious fracture noted positive impingement syndrome noted PT recommended.  Conservative course recommended for left shoulder    Follow Up: 3 months with RIGHT SHOULDER 3 views      Obey Santiago MD, FAAOS  Orthopedic Surgeon  Fellowship Trained Shoulder and Elbow Surgeon  [facility]  Orthopedics and Sports Medicine  [address] [place] [address], [address]  [place] [address]    12/27/22  15:48 EST

## 2023-01-05 ENCOUNTER — TREATMENT (OUTPATIENT)
Dept: PHYSICAL THERAPY | Facility: CLINIC | Age: 63
End: 2023-01-05
Payer: MEDICARE

## 2023-01-05 DIAGNOSIS — S40.012A CONTUSION OF LEFT SHOULDER, INITIAL ENCOUNTER: ICD-10-CM

## 2023-01-05 DIAGNOSIS — M75.01 ADHESIVE CAPSULITIS OF RIGHT SHOULDER: ICD-10-CM

## 2023-01-05 DIAGNOSIS — M25.511 BILATERAL SHOULDER PAIN, UNSPECIFIED CHRONICITY: Primary | ICD-10-CM

## 2023-01-05 DIAGNOSIS — M25.512 BILATERAL SHOULDER PAIN, UNSPECIFIED CHRONICITY: Primary | ICD-10-CM

## 2023-01-05 PROCEDURE — 97162 PT EVAL MOD COMPLEX 30 MIN: CPT | Performed by: PHYSICAL THERAPIST

## 2023-01-05 PROCEDURE — 97140 MANUAL THERAPY 1/> REGIONS: CPT | Performed by: PHYSICAL THERAPIST

## 2023-01-05 NOTE — PROGRESS NOTES
Physical Therapy Initial Evaluation and Plan of Care  [facility] PHYSICAL THERAPY [place]  [address] [facility] [address]  [facility] [address]        Patient: Yashira Gudino   : 1960  Diagnosis/ICD-10 Code:  Bilateral shoulder pain, unspecified chronicity [M25.511, M25.512]  Referring practitioner: Obey Santiago MD  Date of Initial Visit: 2023  Today's Date: 2023  Patient seen for 1 sessions         Visit Diagnoses:    ICD-10-CM ICD-9-CM   1. Bilateral shoulder pain, unspecified chronicity  M25.511 719.41    M25.512    2. Adhesive capsulitis of right shoulder  M75.01 726.0   3. Contusion of left shoulder, initial encounter  S40.012A 923.00     PATIENT WAS 30 MINUTES LATE FOR APPOINTMENT TODAY.    Subjective Questionnaire: QuickDASH: 54.55    Subjective Evaluation    History of Present Illness  Date of surgery: 2022 (Right Shoulder arthoplasty)  Mechanism of injury: RIGHT SHOULDER  Right shoulder OA/DJD underwent arthroplasty, went to [facility] and home health rehab, no outpatient Rehab.  Current Complaint:  Right shoulder joint pain constant with varying intensity, shoulder joint is stiff.  7/10 pain in right shoulder at its worst.  Worse with movement and activities at home.      LEFT SHOULDER  Early 2022, fell at home. Fell back and laterally into left shoulder.  Immediate pain, has had left shoulder pain since. 5/10 pain at worst  Current Complaint: Intermittent Left posterior shoulder pain with movement, reaching lifting.  5/10 pain at worst.    PMH: Cervical fusion 2020 at , 2020 left small toe amputated due to infection.  Left index finger amputated 2020. All due to MRSA infection following a Index finger MCP joint injection at .    Quality of life: fair    Hand dominance: right    Treatments  Previous treatment: home therapy  Patient Goals  Patient goals for therapy: increased motion and independence with ADLs/IADLs             Objective           Static Posture     Head  Forward.    Shoulders  Rounded.    Scapulae  Left protracted and right protracted.    Thoracic Spine  Hyperkyphosis.    Palpation   Left   Hypertonic in the infraspinatus.   Tenderness of the infraspinatus, posterior deltoid and teres minor.     Tenderness     Right Shoulder  No tenderness     Additional Tenderness Details  Left Posterior GH joint tender    Neurological Testing     Reflexes   Left   Biceps (C5/C6): normal (2+)  Brachioradialis (C6): normal (2+)  Triceps (C7): normal (2+)    Right   Biceps (C5/C6): normal (2+)  Brachioradialis (C6): normal (2+)  Triceps (C7): normal (2+)    Active Range of Motion   Left Shoulder   Flexion: 121 degrees   Extension: 70 degrees   Abduction: 102 degrees     Right Shoulder   Flexion: 88 degrees   Extension: 52 degrees   Abduction: 71 degrees     Passive Range of Motion   Left Shoulder   Flexion: 142 degrees   Abduction: 140 degrees   External rotation 90°: 85 degrees   Internal rotation 90°: 40 degrees     Right Shoulder   Flexion: 125 degrees   Abduction: 96 degrees   External rotation 90°: 69 degrees   Internal rotation 90°: 15 degrees     Joint Play   Left Shoulder  Joints within functional limits are the anterior capsule, posterior capsule and inferior capsule.     Right Shoulder  Hypomobile in the anterior capsule, posterior capsule and inferior capsule.     Strength/Myotome Testing     Left Shoulder     Planes of Motion   Flexion: 3-   Extension: 3+   Abduction: 3   Adduction: 3   External rotation at 0°: 3+   Internal rotation at 0°: 4-     Isolated Muscles   Biceps: 4+   Triceps: 5     Right Shoulder     Planes of Motion   Flexion: 4+   Extension: 5   Abduction: 4+   Adduction: 5   External rotation at 0°: 4   Internal rotation at 0°: 5     Tests     Left Shoulder   Negative apprehension, drop arm, empty can, Hawkin's, Neer's and sulcus sign.     Ambulation   Weight-Bearing Status   Weight-Bearing Status (Left): full weight bearing    Weight-Bearing Status (Right): full weight-bearing    Assistive device used: two-wheeled walker    Observational Gait   Gait: crouched   Decreased walking speed and stride length.           Assessment & Plan     Assessment  Impairments: abnormal or restricted ROM, activity intolerance, impaired physical strength and pain with function  Functional Limitations: carrying objects, lifting, pulling, pushing, uncomfortable because of pain, reaching behind back and reaching overhead  Assessment details: 62 year old right handed female presenting with chronic right shoulder pain/stiffness following an arthroplasty 8/31/2022 and subacute posterior left shoulder pain due to a fall on the left shoulder December 2022.  She did not follow up with outpatient rehab following arthroplasty and has developed painful stiffness/weakness/possible capsulitis of right shoulder. Today left shoulder has signs and symptoms of a posterior GH joint/scapula contusion from the fall.  Neurological exam is normal today, cervical exam is normal today.  Left shoulder should gain full recovery quickly, right shoulder may take longer to regain maximum available recovery.  Prognosis: fair  Prognosis details: She was not compliant with right shoulder post operative care relating to rehab and even her HEP.    Goals  Plan Goals: STG to be met in 4 weeks  1. Increase right shoulder flexion ROM to 140 degrees with tolerable discomfort to be able to do some light over head activities.  2. Increase left shoulder internal rotation ROM to 70 degrees to assist with less painful dressing.  3. Pt reports decrease in worst pain level to 3/10, so that the pt can perform more activities at home and work.  4. Pt has improved functional activities so that Quick Dash score improves to 35%.  5. Pt left scapular strength is 5/5 so that there is reduction in impingement motions.  LTG to be met in 12 weeks  1. Pt is independent with each phase of HEP.  2.  Resolve  tenderness around right and left shoulder girdles so that pt is willing to move/use UE more normally.  3. Pt can bear full weight through bilateral UE without shoulder pain so that they can push objects as needs.  4.  Pt has full left shoulder AROM to be able to do all need activities at home and work.  5.  Pt shoulder girdle strength is 5/5 throughout to perform all lifting activities as needed.    Plan  Therapy options: will be seen for skilled therapy services  Planned modality interventions: iontophoresis, cryotherapy, dry needling, ultrasound and high voltage pulsed current (pain management)  Planned therapy interventions: abdominal trunk stabilization, manual therapy, neuromuscular re-education, body mechanics training, flexibility, functional ROM exercises, joint mobilization, home exercise program, stretching, strengthening, therapeutic activities, soft tissue mobilization and postural training  Frequency: 2x week  Duration in weeks: 12  Treatment plan discussed with: patient      Timed:         Manual Therapy:    16     mins  08856;     Un-Timed:  Mod Eval     35     Mins  61058      Timed Treatment:   16   mins   Total Treatment:     51   mins            PT: Odin Robles PT     License Number: [id number]  Electronically signed by Odin Robles PT, 01/05/23, 11:07 AM EST    Medicare Initial Certification  Certification Period: 4/5/2023  I certify that the therapy services are furnished while this patient is under my care.  The services outlined above are required by this patient, and will be reviewed every 90 days.     PHYSICIAN: ________________________________________________________  Obey Santiago MD        DATE: ____________________________________________________________    Please sign and return via fax to [phone].. Thank you, [facility] Physical Therapy.

## 2023-01-10 ENCOUNTER — TREATMENT (OUTPATIENT)
Dept: PHYSICAL THERAPY | Facility: CLINIC | Age: 63
End: 2023-01-10
Payer: MEDICARE

## 2023-01-10 DIAGNOSIS — M25.512 BILATERAL SHOULDER PAIN, UNSPECIFIED CHRONICITY: Primary | ICD-10-CM

## 2023-01-10 DIAGNOSIS — M75.01 ADHESIVE CAPSULITIS OF RIGHT SHOULDER: ICD-10-CM

## 2023-01-10 DIAGNOSIS — M25.511 BILATERAL SHOULDER PAIN, UNSPECIFIED CHRONICITY: Primary | ICD-10-CM

## 2023-01-10 DIAGNOSIS — S40.012A CONTUSION OF LEFT SHOULDER, INITIAL ENCOUNTER: ICD-10-CM

## 2023-01-10 PROCEDURE — 97140 MANUAL THERAPY 1/> REGIONS: CPT | Performed by: PHYSICAL THERAPIST

## 2023-01-10 PROCEDURE — 97530 THERAPEUTIC ACTIVITIES: CPT | Performed by: PHYSICAL THERAPIST

## 2023-01-10 PROCEDURE — 97110 THERAPEUTIC EXERCISES: CPT | Performed by: PHYSICAL THERAPIST

## 2023-01-10 NOTE — PROGRESS NOTES
Physical Therapy Daily Treatment Note  [facility] [place] PHYSICAL THERAPY [place]  [address],   [facility] [address]      Patient: Yashira Gudino   : 1960  Diagnosis/ICD-10 Code:  Bilateral shoulder pain, unspecified chronicity [M25.511, M25.512]  Referring practitioner: Obey Santiago MD  Date of Initial Visit: Type: THERAPY  Noted: 2023    Type: THERAPY  Noted: 2023  Today's Date: 1/10/2023  Patient seen for 2 sessions         Visit Diagnoses:    ICD-10-CM ICD-9-CM   1. Bilateral shoulder pain, unspecified chronicity  M25.511 719.41    M25.512    2. Adhesive capsulitis of right shoulder  M75.01 726.0   3. Contusion of left shoulder, initial encounter  S40.012A 923.00       Subjective   Yashira Gudino reports: left shoulder is improving already. Right shoulder hurts all the time, especially at night.    Objective   LEFT SHOULDER: AROM WNL with only mild discomfort into end ranges.  RIGHT SHOULDER: PROM flexion 135, abd 90 with pain, ER 71 with ERPa  See Exercise, Manual, and Modality Logs for complete treatment.       Assessment/Plan  Left shoulder is recovering well, right shoulder is interfering with her daily function and preventing sleep.  Progress per Plan of Care and Progress strengthening /stabilization /functional activity           Timed:         Manual Therapy:    20     mins  50516;     Therapeutic Exercise:    15     mins  82301;     Neuromuscular Martha:        mins  97375;    Therapeutic Activity:    12      mins  69467;     Gait Training:           mins  79686;     Ultrasound:          mins  68561;    Ionto                                   mins   40552  Self Care                            mins   72070  Canalith Repos         mins 83817      Un-Timed:  Electrical Stimulation:         mins  60059 ( );  Dry Needling          mins self-pay  Traction          mins 72587      Timed Treatment:   47   mins   Total Treatment:     47   mins    Odin Robles, PT  [place] License:  [id number]

## 2023-01-12 ENCOUNTER — TELEPHONE (OUTPATIENT)
Dept: PHYSICAL THERAPY | Facility: CLINIC | Age: 63
End: 2023-01-12

## 2023-01-17 ENCOUNTER — TREATMENT (OUTPATIENT)
Dept: PHYSICAL THERAPY | Facility: CLINIC | Age: 63
End: 2023-01-17
Payer: MEDICARE

## 2023-01-17 DIAGNOSIS — M25.512 BILATERAL SHOULDER PAIN, UNSPECIFIED CHRONICITY: Primary | ICD-10-CM

## 2023-01-17 DIAGNOSIS — M75.01 ADHESIVE CAPSULITIS OF RIGHT SHOULDER: ICD-10-CM

## 2023-01-17 DIAGNOSIS — S40.012S CONTUSION OF LEFT SHOULDER, SEQUELA: ICD-10-CM

## 2023-01-17 DIAGNOSIS — M25.511 BILATERAL SHOULDER PAIN, UNSPECIFIED CHRONICITY: Primary | ICD-10-CM

## 2023-01-17 PROCEDURE — 97110 THERAPEUTIC EXERCISES: CPT | Performed by: PHYSICAL THERAPIST

## 2023-01-17 PROCEDURE — 97112 NEUROMUSCULAR REEDUCATION: CPT | Performed by: PHYSICAL THERAPIST

## 2023-01-17 PROCEDURE — 97140 MANUAL THERAPY 1/> REGIONS: CPT | Performed by: PHYSICAL THERAPIST

## 2023-01-17 NOTE — PROGRESS NOTES
Physical Therapy Daily Treatment Note                           [facility] [place] [facility] [place]   [address] [facility] [address]                                     [facility] [address]      Patient: Yashira Gudino   : 1960  Diagnosis/ICD-10 Code:  Bilateral shoulder pain, unspecified chronicity [M25.511, M25.512]  Referring practitioner: Obey Santiago MD  Date of Initial Visit: Type: THERAPY  Noted: 2023    Type: THERAPY  Noted: 2023  Today's Date: 2023  Patient seen for 3 sessions             Subjective   Yashira Gudino reports: having a lot of pain in the front of the right shoulder. Feels like she is behind on recovery.     Objective          Passive Range of Motion     Right Shoulder   Flexion: 153 degrees   Abduction: 120 degrees   External rotation 90°: 80 (ERP) degrees       See Exercise, Manual, and Modality Logs for complete treatment.       Assessment/Plan  Pt is already improving in PROM. Progressed exercises for involvement of the scapular stabilizers and stretching chest.    Progress per Plan of Care and Progress strengthening /stabilization /functional activity           Timed:  Manual Therapy:    23     mins  99182;  Therapeutic Exercise:    18     mins  97081;     Neuromuscular Martha:  15      mins  74424;    Therapeutic Activity:          mins  49152;     Gait Training:           mins  60948;     Ultrasound:          mins  66630;    Electrical Stimulation:         mins  07013;  Iontophoresis          mins  35319    Untimed:  Electrical Stimulation:         mins  18860 ( );  Mechanical Traction:         mins  43857;   Fluidotherapy          mins  34971    Timed Treatment:   56   mins   Total Treatment:     56   mins        Maria Del Rosario Dietz PTA  Physical Therapist Assistant

## 2023-01-19 ENCOUNTER — TREATMENT (OUTPATIENT)
Dept: PHYSICAL THERAPY | Facility: CLINIC | Age: 63
End: 2023-01-19
Payer: MEDICARE

## 2023-01-19 ENCOUNTER — OFFICE VISIT (OUTPATIENT)
Dept: ORTHOPEDIC SURGERY | Facility: CLINIC | Age: 63
End: 2023-01-19
Payer: MEDICARE

## 2023-01-19 VITALS
HEIGHT: 62 IN | SYSTOLIC BLOOD PRESSURE: 132 MMHG | DIASTOLIC BLOOD PRESSURE: 78 MMHG | WEIGHT: 147.27 LBS | BODY MASS INDEX: 27.1 KG/M2

## 2023-01-19 DIAGNOSIS — M25.511 BILATERAL SHOULDER PAIN, UNSPECIFIED CHRONICITY: Primary | ICD-10-CM

## 2023-01-19 DIAGNOSIS — M25.512 BILATERAL SHOULDER PAIN, UNSPECIFIED CHRONICITY: Primary | ICD-10-CM

## 2023-01-19 DIAGNOSIS — S40.012S CONTUSION OF LEFT SHOULDER, SEQUELA: ICD-10-CM

## 2023-01-19 DIAGNOSIS — Z96.611 STATUS POST TOTAL REPLACEMENT OF RIGHT SHOULDER: Primary | ICD-10-CM

## 2023-01-19 DIAGNOSIS — M75.01 ADHESIVE CAPSULITIS OF RIGHT SHOULDER: ICD-10-CM

## 2023-01-19 PROCEDURE — 99213 OFFICE O/P EST LOW 20 MIN: CPT | Performed by: ORTHOPAEDIC SURGERY

## 2023-01-19 PROCEDURE — G0283 ELEC STIM OTHER THAN WOUND: HCPCS | Performed by: PHYSICAL THERAPIST

## 2023-01-19 PROCEDURE — 97035 APP MDLTY 1+ULTRASOUND EA 15: CPT | Performed by: PHYSICAL THERAPIST

## 2023-01-19 PROCEDURE — 97140 MANUAL THERAPY 1/> REGIONS: CPT | Performed by: PHYSICAL THERAPIST

## 2023-01-19 RX ORDER — LATANOPROST 50 UG/ML
SOLUTION/ DROPS OPHTHALMIC
COMMUNITY
Start: 2023-01-16

## 2023-01-19 NOTE — PROGRESS NOTES
[facility] [place] Orthopaedic Surgery Office Follow Up       Office Follow Up Visit       Patient Name: Yashira Gudino    Chief Complaint:   Chief Complaint   Patient presents with   • Follow-up     3 week follow up - 5 months Status post total shoulder arthroplasty, right 8/31/22       Referring Physician: No ref. provider found    History of Present Illness:   It has been 3  week(s) since Yashira Gudino's last visit. Yashira Gudino returns to clinic today for F/U: postoperative follow-up of rightBody Part: shoulderReason: arthroplasty. The issue has been ongoing for 5 month(s). Yashira Gudino rates HIS/HER: herpain at 9/10 on the pain scale. Previous/current treatments: physical therapy. Current symptoms:Symptoms: pain. The pain is worse with sleeping, working, lying on affected side and any movement of the joint; pain medication and/or NSAID improves the pain. Overall, he/she: sheis doing the same. I have reviewed the patient's history of present illness as noted/entered above.    I have reviewed the patient's past medical history, surgical history, social history, family history, medications, and allergies as noted in the electronic medical record and as noted/entered.  I have reviewed the patient's review of systems as noted/enter and updated as noted in the patient's HPI.      Yashira Gudino is a 62 y.o. female who presents with new problem of: left shoulder pain.  Onset: mechanical fall. The issue has been ongoing for 6 week(s). Pain is a 7/10 on the pain scale. Pain is described as aching. Associated symptoms include pain. The pain is worse with lying on affected side; resting and pain medication and/or NSAID improve the pain. Previous treatments have included: nothing.        Right anatomic total shoulder arthroplasty completed 8/31/2022 12/27/2022:  Right shoulder is 4 months postop doing well improvements noted  Left shoulder unfortunately she  suffered a fall in the shoulder has gotten worse after the fall.  PCP note from  and x-rays from  reviewed no acute bony fracture noted.  She is interested in PT which I think is a great idea she has been at [facility] PCP note reviewed from 12/15/2022, complex medical history as noted prior   x-rays completed left shoulder 3 views 11/14/2022 no acute fracture noted.  Baseline degenerative changes noted.     Grandchildren x2 in [place]    1/19/2023:  RIGHT SHOULDER  Fell about 1 month - hit her granite -- large bruise persists deltoid and lateral shoulder -- significant injury; she is worried about possible injury to the shoulder replacement after the significant fall.    RIGHT TSA --counseled again on the intraoperative findings was concerned about the bone quality but actually at the time of surgery it was quite suitable and satisfactory for stemless total shoulder that allowed us to avoid going through the enchondroma area as well.  Solid fixation was noted at the time of surgery.  Also counseled again on TSA versus RSA.  In general glad that we are able to avoid instrumenting glenoid with reverse shoulder arthroplasty given concerns for possible longevity, fall, renal disease.    Getting more active with ADLs    Pain management treatment center - Dr. Murphy    Physical therapy with Odin Ceja.  She is in excellent hands.  I think she will continue to see improvements with therapy.        Subjective   Subjective      Review of Systems   Musculoskeletal: Positive for arthralgias.   All other systems reviewed and are negative.       Past Medical History:   Past Medical History:   Diagnosis Date   • Arthritis    • Charcot-Bertha-Tooth disease    • Chronic kidney disease (CKD), stage IV (severe) ([facility])    • Depression    • Diabetes ([facility])    • Dialysis patient ([facility])    • Fibromyalgia    • History of MRSA infection 2020    left finger, left foot   • Hypertension    • Ileostomy in  place ([facility])    • Osteoporosis        Past Surgical History:   Past Surgical History:   Procedure Laterality Date   • ANKLE SURGERY     •  SECTION     • COLON RESECTION     • COLONOSCOPY     • COLOSTOMY     • FOOT SURGERY Left     excise Small Toe   • HAND SURGERY Left     Excise IF   • NECK SURGERY     • TONSILLECTOMY     • TOTAL SHOULDER ARTHROPLASTY Right 2022    Procedure: TOTAL SHOULDER ARTHROPLASTY RIGHT;  Surgeon: Obey Santiago MD;  Location: [facility];  Service: Orthopedics;  Laterality: Right;   • TUBAL ABDOMINAL LIGATION         Family History: History reviewed. No pertinent family history.    Social History:   Social History     Socioeconomic History   • Marital status: Legally    Tobacco Use   • Smoking status: Never   • Smokeless tobacco: Never   Vaping Use   • Vaping Use: Never used   Substance and Sexual Activity   • Alcohol use: Not Currently   • Drug use: Never   • Sexual activity: Defer       Medications:   Current Outpatient Medications:   •  amLODIPine (NORVASC) 10 MG tablet, Take 10 mg by mouth Every Night., Disp: , Rfl:   •  aspirin 81 MG EC tablet, Take 1 tablet by mouth Daily., Disp: , Rfl:   •  cetirizine (zyrTEC) 10 MG tablet, Take 10 mg by mouth Daily., Disp: , Rfl:   •  Cranberry 450 MG capsule, Take 450 mg by mouth Every Night., Disp: , Rfl:   •  cyclobenzaprine (FLEXERIL) 10 MG tablet, Take 10 mg by mouth 3 (Three) Times a Day As Needed for Muscle Spasms (Taken at night)., Disp: , Rfl:   •  Diclofenac Sodium (VOLTAREN) 1 % gel gel, Place 1 application on the skin as directed by provider As Needed (joint pain)., Disp: , Rfl:   •  gabapentin (NEURONTIN) 600 MG tablet, Take 1 tablet by mouth Every Night., Disp: 20 tablet, Rfl: 0  •  Glucosamine-Chondroit-Vit C-Mn (Glucosamine-Chondroitin Max St) capsule, Take 1 capsule by mouth Every Night., Disp: , Rfl:   •  HYDROcodone-acetaminophen (NORCO) 7.5-325 MG per tablet, Take 1 tablet by mouth Every 6 (Six) Hours As  "Needed for Moderate Pain., Disp: 20 tablet, Rfl: 0  •  insulin aspart (NovoLOG FlexPen) 100 UNIT/ML solution pen-injector sc pen, Sliding scale 3-5 units per meal based on blood sugar Correction dose as prescribed, Disp: , Rfl:   •  Insulin Glargine (Lantus SoloStar) 100 UNIT/ML injection pen, Inject 20 Units under the skin into the appropriate area as directed. 20 units in the am 10 units in the pm Per patient, Disp: , Rfl:   •  latanoprost (XALATAN) 0.005 % ophthalmic solution, one drop OU each day at bedtime., Disp: , Rfl:   •  lidocaine (LIDODERM) 5 %, Place 1 patch on the skin as directed by provider Daily., Disp: , Rfl:   •  Melatonin 10 MG tablet, Take 10 mg by mouth Every Night., Disp: , Rfl:   •  omeprazole (priLOSEC) 40 MG capsule, Take 40 mg by mouth Daily., Disp: , Rfl:   •  ondansetron (ZOFRAN) 4 MG tablet, Take 4 mg by mouth., Disp: , Rfl:   •  sevelamer (RENVELA) 800 MG tablet, Take 1,600 mg by mouth., Disp: , Rfl:   •  sodium zirconium cyclosilicate (LOKELMA) 5 g pack, Take 5 g by mouth Daily., Disp: , Rfl:   •  bumetanide (BUMEX) 2 MG tablet, Take 4 mg by mouth 2 (Two) Times a Day., Disp: , Rfl:   •  carvedilol (COREG) 25 MG tablet, Take 25 mg by mouth 2 (Two) Times a Day With Meals., Disp: , Rfl:   •  citalopram (CeleXA) 40 MG tablet, Take 40 mg by mouth Daily., Disp: , Rfl:     Allergies:   Allergies   Allergen Reactions   • Erythromycin Nausea Only and Other (See Comments)     nausea  upset stomach   • Erythromycin Base Other (See Comments)     nausea         The following portions of the patient's history were reviewed and updated as appropriate: allergies, current medications, past family history, past medical history, past social history, past surgical history and problem list.        Objective    Objective      Vital Signs:   Vitals:    01/19/23 1434   BP: 132/78   Weight: 66.8 kg (147 lb 4.3 oz)   Height: 157.5 cm (62.01\")       Ortho Exam:  RIGHT SHOULDER  Significant bruising after her " fall-- getter better she notes  Active and passive range of motion improving no pain with arc of motion below 90 smooth arc.  Incision is pristine appearing  Good deltoid strength does have some baseline diminished abduction strength but negative lag signs.  Good external rotation good subscapularis strength.    Results Review:  Imaging Results (Last 24 Hours)     Procedure Component Value Units Date/Time    XR Shoulder 2+ View Right [[id number]] Resulted: 01/19/23 1516     Updated: 01/19/23 1517    Narrative:      Imaging: shoulder x-rays 3 views - AP, axillary, and scapular-Y x-ray   views    Side: Right shoulder    Indication for shoulder x-ray 3 views: shoulder pain    Comparison: preoperative and serial postoperative imaging    Findings:  Right shoulder anatomical total shoulder arthroplasty.  The incidentally   noted enchondroma was again noted with no changes.  The shoulder appears   to be stable compared to prior with acromiohumeral index preserved.    Closely scrutinized the immediate postoperative images to these.  No   obvious diminished acromiohumeral index compared to prior.  Balanced on   axillary as well.  No obvious shift or lucency on the humeral or glenoid   side at this time.  Stable findings noted on serial radiographic images.    Counseled patient we will continue to follow along.    I personally reviewed the above x-rays.              Procedures            Assessment / Plan      Assessment/Plan:   Problem List Items Addressed This Visit        Musculoskeletal and Injuries    Status post total replacement of right shoulder - Primary    Relevant Orders    XR Shoulder 2+ View Right (Completed)       We had a great discussion today regarding recovery.  Overall seems to be improving no obvious shifting of the implants or of the acromiohumeral index.  Certainly we will continue to follow along.  Patient has a good attitude with regards to the recovery and what is possible following her significant  pathology.  Overall she will continue with Odin in physical therapy and I do anticipate gains as she continues to work on scapular stabilization core stabilization.  Shoulder may plateau over the next 2 to 3 months but improvements can be noted even up to 1 year.    Follow Up: 2 months with right shoulder 3 views      Obey Santiago MD, FAAOS  Orthopedic Surgeon  Fellowship Trained Shoulder and Elbow Surgeon  [facility]  Orthopedics and Sports Medicine  [address] [place] [address], [address]  [place] [address]    01/19/23  15:17 EST

## 2023-01-20 NOTE — PROGRESS NOTES
Physical Therapy Daily Treatment Note  [facility] [place] [facility] [place]  [address], [address] [facility] [address]  [facility] [address]      Patient: Yashira Gudino   : 1960  Diagnosis/ICD-10 Code:  Bilateral shoulder pain, unspecified chronicity [M25.511, M25.512]  Referring practitioner: Obey Santiago MD  Date of Initial Visit: Type: THERAPY  Noted: 2023    Type: THERAPY  Noted: 2023  Today's Date: 2023  Patient seen for 4 sessions         Visit Diagnoses:    ICD-10-CM ICD-9-CM   1. Bilateral shoulder pain, unspecified chronicity  M25.511 719.41    M25.512    2. Adhesive capsulitis of right shoulder  M75.01 726.0   3. Contusion of left shoulder, sequela  S40.012S 906.3       Subjective   Yashira Gudino reports: went to see MD today because of severity of right shoulder pain all the time. 7-910 pain.  Left shoulder is doing well.    Objective          Palpation     Right   Hypertonic in the infraspinatus and teres minor. Tenderness of the infraspinatus, teres major and teres minor.   Trigger point to infraspinatus and teres minor.     Active Range of Motion   Left Shoulder   Normal active range of motion    Passive Range of Motion     Right Shoulder   Flexion: 92 degrees with pain  Abduction: 78 degrees with pain  External rotation 45°: 30 degrees with pain    Joint Play     Right Shoulder  Joints within functional limits are the anterior capsule, posterior capsule and inferior capsule.         See Exercise, Manual, and Modality Logs for complete treatment.       Assessment/Plan  Right RTC muscles (infraspinatus and Teres Minor) are in spasm with painful trigger points.  Get this area settle down and we may be able determine if joint itself is hurting.  Progress per Plan of Care and Progress strengthening /stabilization /functional activity           Timed:         Manual Therapy:    16     mins  54488;     Therapeutic Exercise:         mins  46691;     Neuromuscular Martha:        mins  54991;     Therapeutic Activity:          mins  70906;     Gait Training:           mins  94439;     Ultrasound:    15      mins  61239;    Ionto                                   mins   88507  Self Care                            mins   39386  Canalith Repos         mins 49361      Un-Timed:  Electrical Stimulation:    20     mins  47813 ( );  Dry Needling          mins self-pay  Traction          mins 94073      Timed Treatment:   31   mins   Total Treatment:     51   mins    Odin Robles, PT  [place] License: [id number]

## 2023-01-24 ENCOUNTER — TELEPHONE (OUTPATIENT)
Dept: PHYSICAL THERAPY | Facility: CLINIC | Age: 63
End: 2023-01-24

## 2023-01-26 ENCOUNTER — TREATMENT (OUTPATIENT)
Dept: PHYSICAL THERAPY | Facility: CLINIC | Age: 63
End: 2023-01-26
Payer: MEDICARE

## 2023-01-26 DIAGNOSIS — M75.01 ADHESIVE CAPSULITIS OF RIGHT SHOULDER: ICD-10-CM

## 2023-01-26 DIAGNOSIS — M25.512 BILATERAL SHOULDER PAIN, UNSPECIFIED CHRONICITY: Primary | ICD-10-CM

## 2023-01-26 DIAGNOSIS — M25.511 BILATERAL SHOULDER PAIN, UNSPECIFIED CHRONICITY: Primary | ICD-10-CM

## 2023-01-26 DIAGNOSIS — S40.012S CONTUSION OF LEFT SHOULDER, SEQUELA: ICD-10-CM

## 2023-01-26 PROCEDURE — 97140 MANUAL THERAPY 1/> REGIONS: CPT | Performed by: PHYSICAL THERAPIST

## 2023-01-26 PROCEDURE — 97035 APP MDLTY 1+ULTRASOUND EA 15: CPT | Performed by: PHYSICAL THERAPIST

## 2023-01-26 PROCEDURE — 97110 THERAPEUTIC EXERCISES: CPT | Performed by: PHYSICAL THERAPIST

## 2023-01-29 NOTE — PROGRESS NOTES
Physical Therapy Daily Treatment Note  [facility] [place] [facility] [place]  [address] [facility] [address]  [facility] [address]      Patient: Yashira Gudino   : 1960  Diagnosis/ICD-10 Code:  Bilateral shoulder pain, unspecified chronicity [M25.511, M25.512]  Referring practitioner: Obey Santiago MD  Date of Initial Visit: Type: THERAPY  Noted: 2023    Type: THERAPY  Noted: 2023  Today's Date: 2023  Patient seen for 5 sessions         Visit Diagnoses:    ICD-10-CM ICD-9-CM   1. Bilateral shoulder pain, unspecified chronicity  M25.511 719.41    M25.512    2. Adhesive capsulitis of right shoulder  M75.01 726.0   3. Contusion of left shoulder, sequela  S40.012S 906.3       Subjective   Yashira Gudino reports: pain 7/10 at its worst at right shoulder from front to back of shoulder.  Pain present most of the time.  Last treatment to the right shoulder helped a little.  Moving right shoulder out to the side is very painful also.    Objective   PALPATION: scar anterior Right GH joint, no discomfort.  Right infraspinatus and teres minor muscle is very tender with trigger points.  LEFT SHOULDER: full ROM, no tenderness now.  PROM RIGHT SHOULDER:  Flexion 105 degree, abduction 68, ER 45  STRENGTH: Right shoulder ER 5/5, IR 5/5  See Exercise, Manual, and Modality Logs for complete treatment.       Assessment/Plan  Pt has capsular end feel in all planes with right shoulder. She has painful trigger points right posterior scapula. Suspect all these structures are contributing to her discomfort.  Progress per Plan of Care and Progress strengthening /stabilization /functional activity           Timed:         Manual Therapy:    25     mins  20914;     Therapeutic Exercise:    17     mins  23940;     Neuromuscular Martha:        mins  99557;    Therapeutic Activity:          mins  57546;     Gait Training:           mins  13813;     Ultrasound:     13     mins  92790;    Ionto                                    mins   76393  Self Care                            mins   44174  Canalith Repos         mins 34290      Un-Timed:  Electrical Stimulation:         mins  24684 ( );  Dry Needling          mins self-pay  Traction          mins 34180      Timed Treatment:   55   mins   Total Treatment:     55   mins    Odin Robles, PT  [place] License: [id number]

## 2023-01-30 ENCOUNTER — TELEPHONE (OUTPATIENT)
Dept: PHYSICAL THERAPY | Facility: CLINIC | Age: 63
End: 2023-01-30

## 2023-02-02 ENCOUNTER — TELEPHONE (OUTPATIENT)
Dept: PHYSICAL THERAPY | Facility: CLINIC | Age: 63
End: 2023-02-02

## 2024-10-01 ENCOUNTER — TELEPHONE (OUTPATIENT)
Dept: ORTHOPEDIC SURGERY | Facility: CLINIC | Age: 64
End: 2024-10-01
Payer: MEDICARE

## 2024-10-01 NOTE — TELEPHONE ENCOUNTER
Caller: IGNACIO GEORGE    Phone Number: [phone] (home)     Reason for Call:   PATIENT CALLED IN WITH ISSUE WITH RIGHT  SHOULDER DR. BENITES REPLACED. UNSURE IF ITS JUST LOSE HARDWARE OR IF THERE IS AN INFECTION DR. BENITES FIRST AVAILABLE IS 10-8-24 PLEASE ASSIST

## 2024-10-02 NOTE — TELEPHONE ENCOUNTER
I passed the appointment information along to Brianna at  through secure chat. She is contacting the pt now to get her scheduled. She will also advise the patient again to go to the ER if she experiences any fever, chills, sweats ect prior to her appointment.

## (undated) DEVICE — GLV SURG BIOGEL LTX PF 7 1/2

## (undated) DEVICE — SPNG GZ WOVN 4X4IN 12PLY 10/BX STRL

## (undated) DEVICE — TRAP FLD MINIVAC MEGADYNE 100ML

## (undated) DEVICE — ANTIBACTERIAL UNDYED BRAIDED (POLYGLACTIN 910), SYNTHETIC ABSORBABLE SUTURE: Brand: COATED VICRYL

## (undated) DEVICE — SYS CLS SKIN PREMIERPRO EXOFINFUSION 22CM

## (undated) DEVICE — 3 BONE CEMENT MIXER: Brand: MIXEVAC

## (undated) DEVICE — GOWN,NON-REINFORCED,SIRUS,SET IN SLV,XL: Brand: MEDLINE

## (undated) DEVICE — HDRST POSTIN FM CRDL TRACH SLOT 9X8X4IN

## (undated) DEVICE — PK MAJ SHLDR SPLT 10

## (undated) DEVICE — TBG PENCL TELESCP MEGADYNE SMOKE EVAC 10FT

## (undated) DEVICE — MEDI-VAC YANKAUER SUCTION HANDLE: Brand: CARDINAL HEALTH

## (undated) DEVICE — ELECTRD NDL EZ CLN STD 2.75IN

## (undated) DEVICE — SUT XBRAID/S NMBR2 TPR 1/2CIR 40MM 39IN WHT/BLK

## (undated) DEVICE — 3M™ MEDIPORE™ H SOFT CLOTH SURGICAL TAPE, 2863, 3 IN X 10 YD, 12/CASE: Brand: 3M™ MEDIPORE™

## (undated) DEVICE — GLV SURG SENSICARE PI ORTHO SZ7.5 LF STRL

## (undated) DEVICE — DRAPE,TOP,102X53,STERILE: Brand: MEDLINE

## (undated) DEVICE — BLANKT WARM LOWR/BDY 100X120CM

## (undated) DEVICE — SUT ETHIB 1 CT1 30IN  X425H

## (undated) DEVICE — SUT VICYL PLS CTD ANTIB BR 1 27IN VIL

## (undated) DEVICE — 450 ML BOTTLE OF 0.05% CHLORHEXIDINE GLUCONATE IN 99.95% STERILE WATER FOR IRRIGATION, USP AND APPLICATOR.: Brand: IRRISEPT ANTIMICROBIAL WOUND LAVAGE

## (undated) DEVICE — 3M™ STERI-DRAPE™ INSTRUMENT POUCH 1018: Brand: STERI-DRAPE™

## (undated) DEVICE — DRSNG PAD ABD 8X10IN STRL

## (undated) DEVICE — 1010 S-DRAPE TOWEL DRAPE 10/BX: Brand: STERI-DRAPE™

## (undated) DEVICE — STRYKER PERFORMANCE SERIES SAGITTAL BLADE: Brand: STRYKER PERFORMANCE SERIES

## (undated) DEVICE — PATIENT RETURN ELECTRODE, SINGLE-USE, CONTACT QUALITY MONITORING, ADULT, WITH 9FT CORD, FOR PATIENTS WEIGING OVER 33LBS. (15KG): Brand: MEGADYNE

## (undated) DEVICE — APPL CHLORAPREP TINTED 26ML TEAL

## (undated) DEVICE — SYRINGE,PISTON,IRRIGATION,60ML,STERILE: Brand: MEDLINE

## (undated) DEVICE — TP SILK DURAPORE 1 IN